# Patient Record
Sex: FEMALE | Race: WHITE | NOT HISPANIC OR LATINO | Employment: OTHER | URBAN - METROPOLITAN AREA
[De-identification: names, ages, dates, MRNs, and addresses within clinical notes are randomized per-mention and may not be internally consistent; named-entity substitution may affect disease eponyms.]

---

## 2017-03-14 DIAGNOSIS — M79.671 PAIN IN BOTH FEET: ICD-10-CM

## 2017-03-14 DIAGNOSIS — G62.9 PERIPHERAL POLYNEUROPATHY: ICD-10-CM

## 2017-03-14 DIAGNOSIS — M79.672 PAIN IN BOTH FEET: ICD-10-CM

## 2017-03-14 RX ORDER — GABAPENTIN 600 MG/1
600 TABLET ORAL 3 TIMES DAILY
Qty: 90 TABLET | Refills: 0 | Status: SHIPPED | OUTPATIENT
Start: 2017-03-14 | End: 2017-04-09 | Stop reason: SDUPTHER

## 2017-03-14 NOTE — TELEPHONE ENCOUNTER
----- Message from Kimber Johns sent at 3/14/2017  8:16 AM CDT -----  Contact: [facility]  Brittany Park  MRN: [id number]  : 1960  PCP: Clyde Almeida  Home Phone      [phone]  Work Phone      Not on file.  Mobile          [phone]      MESSAGE: The pharmacy is requesting a refill for the patient for gabapentin (NEURONTIN) 600 MG tablet. Take one tablet by mouth three times daily. Qty. 90.  Phone:[phone]  Fas:[phone]

## 2017-03-22 ENCOUNTER — OFFICE VISIT (OUTPATIENT)
Dept: NEUROLOGY | Facility: CLINIC | Age: 57
End: 2017-03-22
Payer: COMMERCIAL

## 2017-03-22 VITALS
HEIGHT: 62 IN | HEART RATE: 68 BPM | RESPIRATION RATE: 16 BRPM | SYSTOLIC BLOOD PRESSURE: 128 MMHG | BODY MASS INDEX: 48.11 KG/M2 | DIASTOLIC BLOOD PRESSURE: 82 MMHG | WEIGHT: 261.44 LBS

## 2017-03-22 DIAGNOSIS — R20.2 ARM PARESTHESIA, LEFT: ICD-10-CM

## 2017-03-22 DIAGNOSIS — M79.602 LEFT ARM PAIN: ICD-10-CM

## 2017-03-22 DIAGNOSIS — G62.9 PERIPHERAL POLYNEUROPATHY: Primary | ICD-10-CM

## 2017-03-22 DIAGNOSIS — F32.A DEPRESSION, UNSPECIFIED DEPRESSION TYPE: ICD-10-CM

## 2017-03-22 PROCEDURE — 99999 PR PBB SHADOW E&M-EST. PATIENT-LVL IV: CPT | Mod: PBBFAC,,, | Performed by: NURSE PRACTITIONER

## 2017-03-22 PROCEDURE — 99214 OFFICE O/P EST MOD 30 MIN: CPT | Mod: S$GLB,,, | Performed by: NURSE PRACTITIONER

## 2017-03-22 PROCEDURE — 3074F SYST BP LT 130 MM HG: CPT | Mod: S$GLB,,, | Performed by: NURSE PRACTITIONER

## 2017-03-22 PROCEDURE — 3079F DIAST BP 80-89 MM HG: CPT | Mod: S$GLB,,, | Performed by: NURSE PRACTITIONER

## 2017-03-22 PROCEDURE — 1160F RVW MEDS BY RX/DR IN RCRD: CPT | Mod: S$GLB,,, | Performed by: NURSE PRACTITIONER

## 2017-03-22 RX ORDER — PANTOPRAZOLE SODIUM 40 MG/1
40 TABLET, DELAYED RELEASE ORAL EVERY MORNING
COMMUNITY
Start: 2017-03-01

## 2017-03-22 RX ORDER — DULOXETIN HYDROCHLORIDE 30 MG/1
60 CAPSULE, DELAYED RELEASE ORAL DAILY
Qty: 60 CAPSULE | Refills: 5 | Status: SHIPPED | OUTPATIENT
Start: 2017-03-22 | End: 2017-08-01 | Stop reason: SDUPTHER

## 2017-03-22 NOTE — MR AVS SNAPSHOT
[facility]  [address] [facility] [address]  Phone: [phone]  Fax: [phone]                  Brittany Park   3/22/2017 1:20 PM   Office Visit    Description:  Female : 1960   Provider:  Tiera Johns NP   Department:  [facility]           Reason for Visit     Neurologic Problem           Diagnoses this Visit        Comments    Peripheral polyneuropathy         Depression, unspecified depression type         Left arm pain         Arm paresthesia, left                To Do List           Goals (5 Years of Data)     None      Follow-Up and Disposition     Return in about 4 months (around 2017).       These Medications        Disp Refills Start End    duloxetine (CYMBALTA) 30 MG capsule 60 capsule 5 3/22/2017 3/22/2018    Take 2 capsules (60 mg total) by mouth once daily. - Oral    Pharmacy: [facility] [address] [place] [id number] [facility] [address] Ph #: [phone]         [facility] On Call     [facility] On Call Nurse Care Line -  Assistance  Registered nurses in the [facility] On Call Center provide clinical advisement, health education, appointment booking, and other advisory services.  Call for this free service at [phone].             Medications           Message regarding Medications     Verify the changes and/or additions to your medication regime listed below are the same as discussed with your clinician today.  If any of these changes or additions are incorrect, please notify your healthcare provider.        CHANGE how you are taking these medications     Start Taking Instead of    duloxetine (CYMBALTA) 30 MG capsule duloxetine (CYMBALTA) 30 MG capsule    Dosage:  Take 2 capsules (60 mg total) by mouth once daily. Dosage:  Take 1 capsule (30 mg total) by mouth once daily.    Reason for Change:  Reorder       STOP taking these medications     hydrocodone-acetaminophen 7.5-325mg (NORCO) 7.5-325 mg per tablet Take 1 tablet by  "mouth every 6 (six) hours as needed for Pain.           Verify that the below list of medications is an accurate representation of the medications you are currently taking.  If none reported, the list may be blank. If incorrect, please contact your healthcare provider. Carry this list with you in case of emergency.           Current Medications     alprazolam (XANAX) 0.25 MG tablet Take 0.25 mg by mouth 2 (two) times daily as needed for Anxiety.    aspirin (ECOTRIN) 81 MG EC tablet Take 81 mg by mouth once daily.    atorvastatin (LIPITOR) 40 MG tablet Take 40 mg by mouth once daily.    CALCIUM CARBONATE/VITAMIN D3 (CALCIUM WITH VITAMIN D ORAL) Take 1 tablet by mouth 2 (two) times daily.    duloxetine (CYMBALTA) 30 MG capsule Take 2 capsules (60 mg total) by mouth once daily.    fesoterodine (TOVIAZ) 4 mg Tb24 Take 1 tablet (4 mg total) by mouth once daily.    furosemide (LASIX) 20 MG tablet Take 20 mg by mouth 2 (two) times daily.    gabapentin (NEURONTIN) 600 MG tablet Take 1 tablet (600 mg total) by mouth 3 (three) times daily.    meloxicam (MOBIC) 7.5 MG tablet Take 7.5 mg by mouth once daily.     metformin (GLUCOPHAGE) 500 MG tablet Take 250 mg by mouth daily with breakfast.     metoprolol tartrate (LOPRESSOR) 50 MG tablet Take 50 mg by mouth 2 (two) times daily.     multivitamin (ONE DAILY MULTIVITAMIN) per tablet Take 1 tablet by mouth once daily.    pantoprazole (PROTONIX) 40 MG tablet Take 40 mg by mouth once daily.     potassium chloride 10% (KAYCIEL) 20 mEq/15 mL solution TAKE 15 ML'S BY MOUTH DAILY    spironolactone (ALDACTONE) 25 MG tablet Take 25 mg by mouth once daily.    UNKNOWN TO PATIENT Apply 1 application topically once daily.           Clinical Reference Information           Your Vitals Were     BP Pulse Resp Height Weight Last Period    128/82 (BP Location: Right arm, Patient Position: Sitting, BP Method: Manual) 68 16 5' 2" (1.575 m) 118.6 kg (261 lb 7.5 oz) 11/20/1998    BMI                " 47.82 kg/m2          Blood Pressure          Most Recent Value    BP  128/82      Allergies as of 3/22/2017     Morphine    Latex, Natural Rubber      Immunizations Administered on Date of Encounter - 3/22/2017     None      Orders Placed During Today's Visit      Normal Orders This Visit    Ambulatory consult to Orthopedics       Language Assistance Services     ATTENTION: Language assistance services are available, free of charge. Please call [phone].      ATENCIÓN: Si habla español, tiene a penny disposición servicios gratuitos de asistencia lingüística. Llame al [phone].     CHÚ Ý: N?u b?n nói Ti?ng Vi?t, có các d?ch v? h? tr? ngôn ng? mi?n phí dành cho b?n. G?i s? [phone].         [facility] complies with applicable Federal civil rights laws and does not discriminate on the basis of race, color, national origin, age, disability, or sex.

## 2017-03-22 NOTE — PROGRESS NOTES
HPI:  Brittany Park is a 56 y.o. female with polyneuropathy affecting the hands and feet, as well as mild right CTS. She is S/P left CTR.     Her Prozac was changed to Cymbalta at her last visit, in an attempt to more optimally control her neuropathic complaints to her feet, in addition to Gabapentin and compound cream. Her foot pain has improved by 50%, but she continues with some burning pain to her toes at night.     She finds that she has more energy with the Cymbalta, and her depression is better controlled with this than with Prozac.     Her LUE paresthesias continue in the ulnar distribution; however, the paresthesias extend above her elbow, nearly to her shoulder. She has still not yet seen Dr. Mitchell for this, as she never received a call to schedule.     Review of Systems   Constitutional: Negative for fever.   Eyes: Negative for blurred vision and double vision.   Respiratory: Negative for hemoptysis.    Cardiovascular: Negative for leg swelling.   Gastrointestinal: Negative for blood in stool.   Genitourinary: Negative for hematuria.   Musculoskeletal: Negative for back pain and falls.   Skin: Negative for rash.   Neurological: Positive for tingling and sensory change. Negative for dizziness, weakness and headaches.   Psychiatric/Behavioral: Negative for depression. The patient does not have insomnia.    Some left knee pain for years. She can talk to ortho about this as discussed    Exam:  Gen Appearance, well developed/nourished in no apparent distress  CV: 2+ distal pulses with no edema or swelling  Neuro:  MS: Awake, alert,Sustains attention. Recent/remote memory intact, Language is full to spontaneous speech/comprehension. Fund of Knowledge is full  CN: Optic discs are flat with normal vasculature, PERRL, Extraoccular movements and visual fields are full. Normal facial sensation and strength,  Tongue and Palate are midline and strong. Shoulder Shrug symmetric and strong.  Motor: Normal bulk,  tone, no abnormal movements. 5/5 strength bilateral upper/lower extremities with 2+ reflexes in the arms and 1+ at the knees and none at the knees   Sensory: symmetric to temp, pin and vibration.  Romberg negative  Cerebellar: Finger-nose,Heal-shin, Rapid alternating movements intact  Gait: Normal stance, no ataxia    EMG 7/2016:     1. Mild Right Carpal Tunnel Syndrome. (Resolution of Left CTS since 7/2015 EMG/NCS is noted)  2. 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 This UE findings is new since 7/2015 EMG/NCS  3. No evidence of ulnar neuropathy at the elbow.     Assessment/Plan: Brittany Park is a 56 y.o. female with polyneuropathy, as well as bilateral CTS. She is S/P left CTR. Her neuropathic complaints are improved since increasing Gabapentin to tid dosing.     I recommend:     1. Increase Cymbalta to 60 mg qd for greater control of her neuropathy.   2. Continue Gabapentin as well for neuropathy.   3. Refill neuropathic cream.   4. Refer to Dr. Broussard for evaluation of ongoing LUE pain and paresthesias, as EMG was unrevealing for ulnar neuropathy. Her paresthesias affecting the left ulnar distribution are unchanged; it is important to note that her complaints extend 5-6 inches above her elbow. While the lack of ulnar neuropathy on her EMG does not exclude ulnar neuropathy, her complaints could be more ortho related.     Follow up in 4 months.

## 2017-04-09 DIAGNOSIS — G62.9 PERIPHERAL POLYNEUROPATHY: ICD-10-CM

## 2017-04-09 DIAGNOSIS — M79.671 PAIN IN BOTH FEET: ICD-10-CM

## 2017-04-09 DIAGNOSIS — M79.672 PAIN IN BOTH FEET: ICD-10-CM

## 2017-04-10 ENCOUNTER — TELEPHONE (OUTPATIENT)
Dept: ORTHOPEDICS | Facility: CLINIC | Age: 57
End: 2017-04-10

## 2017-04-10 DIAGNOSIS — R52 PAIN: Primary | ICD-10-CM

## 2017-04-10 NOTE — TELEPHONE ENCOUNTER
Brittany Park reminded of appointment on 4/11/17 with Dr. VALENTINA Broussard w/time and location. Notified of need for xray before OV w/date, time, and location of appts. Pt verbalized understanding.    Per patient LEFT hand and elbow.

## 2017-04-11 ENCOUNTER — HOSPITAL ENCOUNTER (OUTPATIENT)
Dept: RADIOLOGY | Facility: OTHER | Age: 57
Discharge: HOME | End: 2017-04-11
Attending: ORTHOPAEDIC SURGERY
Payer: COMMERCIAL

## 2017-04-11 ENCOUNTER — OFFICE VISIT (OUTPATIENT)
Dept: ORTHOPEDICS | Facility: CLINIC | Age: 57
End: 2017-04-11
Payer: COMMERCIAL

## 2017-04-11 VITALS
WEIGHT: 261.44 LBS | SYSTOLIC BLOOD PRESSURE: 99 MMHG | BODY MASS INDEX: 48.11 KG/M2 | HEIGHT: 62 IN | HEART RATE: 66 BPM | DIASTOLIC BLOOD PRESSURE: 65 MMHG

## 2017-04-11 DIAGNOSIS — G56.20 LESION OF ULNAR NERVE, UNSPECIFIED LATERALITY: Primary | ICD-10-CM

## 2017-04-11 DIAGNOSIS — R52 PAIN: ICD-10-CM

## 2017-04-11 DIAGNOSIS — G56.22 ULNAR NEUROPATHY OF LEFT UPPER EXTREMITY: Primary | ICD-10-CM

## 2017-04-11 PROCEDURE — 73080 X-RAY EXAM OF ELBOW: CPT | Mod: 26,LT,, | Performed by: RADIOLOGY

## 2017-04-11 PROCEDURE — 73080 X-RAY EXAM OF ELBOW: CPT | Mod: TC,LT

## 2017-04-11 PROCEDURE — 1160F RVW MEDS BY RX/DR IN RCRD: CPT | Mod: S$GLB,,, | Performed by: ORTHOPAEDIC SURGERY

## 2017-04-11 PROCEDURE — 99999 PR PBB SHADOW E&M-EST. PATIENT-LVL III: CPT | Mod: PBBFAC,,, | Performed by: ORTHOPAEDIC SURGERY

## 2017-04-11 PROCEDURE — 73130 X-RAY EXAM OF HAND: CPT | Mod: TC,LT

## 2017-04-11 PROCEDURE — 3074F SYST BP LT 130 MM HG: CPT | Mod: S$GLB,,, | Performed by: ORTHOPAEDIC SURGERY

## 2017-04-11 PROCEDURE — 99204 OFFICE O/P NEW MOD 45 MIN: CPT | Mod: S$GLB,,, | Performed by: ORTHOPAEDIC SURGERY

## 2017-04-11 PROCEDURE — 73130 X-RAY EXAM OF HAND: CPT | Mod: 26,LT,, | Performed by: RADIOLOGY

## 2017-04-11 PROCEDURE — 3078F DIAST BP <80 MM HG: CPT | Mod: S$GLB,,, | Performed by: ORTHOPAEDIC SURGERY

## 2017-04-11 RX ORDER — CETIRIZINE HYDROCHLORIDE 10 MG/1
10 TABLET ORAL DAILY
COMMUNITY
End: 2020-02-10

## 2017-04-11 RX ORDER — GABAPENTIN 600 MG/1
TABLET ORAL
Qty: 90 TABLET | Refills: 5 | Status: SHIPPED | OUTPATIENT
Start: 2017-04-11 | End: 2017-08-01 | Stop reason: SDUPTHER

## 2017-04-11 NOTE — LETTER
April 24, 2017      Tiera Johns, NP  [address] [facility] [address]           [facility]  [address], [address]  [facility] [address]  Phone: [phone]          Patient: Brittany Park   MR Number: [id number]   YOB: 1960   Date of Visit: 4/11/2017       Dear Tiera Johns:    Thank you for referring Brittany Park to me for evaluation. Attached you will find relevant portions of my assessment and plan of care.    If you have questions, please do not hesitate to call me. I look forward to following Brittany Park along with you.    Sincerely,    Violette Broussard MD    Enclosure  CC:  No Recipients    If you would like to receive this communication electronically, please contact [email or web address] or [phone] to request more information on [employer] Link access.    For providers and/or their staff who would like to refer a patient to [facility], please contact us through our one-stop-shop provider referral line, [facility], at [phone].    If you feel you have received this communication in error or would no longer like to receive these types of communications, please e-mail [email or web address]

## 2017-04-11 NOTE — PROGRESS NOTES
CC:  Left Cubital Tunnel Syndrome    HPI:  Brittany Park is a  57 y.o.  Female (RHD) with left hand pain and numbness.  Symptoms have been present for some time.  She is s/p CTR by outside surgeon with good relief. Unfortunately her ulnar nerve symptoms were unmasked following resolution of her CTS. Patient is s/p EMG with equivocal results indicating possible Ulnar nerve compression. She has attempted bracing with minimal relief. Symptoms present at all times. Reports significant decrease in  strength.    PAST MEDICAL HISTORY:   Past Medical History:   Diagnosis Date    Acid reflux     Anxiety     Edema     Hypertension     FER on CPAP     Other and unspecified hyperlipidemia     Urinary frequency     Urinary incontinence      PAST SURGICAL HISTORY:   Past Surgical History:   Procedure Laterality Date    APPENDECTOMY      CARPAL TUNNEL RELEASE  10/2015    left hand    CHOLECYSTECTOMY      HYSTERECTOMY      BRETT/BSO    UPPER GASTROINTESTINAL ENDOSCOPY       FAMILY HISTORY:   Family History   Problem Relation Age of Onset    Stroke Father     Diabetes Mother     Stroke Mother     Diabetes Brother     Breast cancer Neg Hx     Colon cancer Neg Hx     Ovarian cancer Neg Hx      SOCIAL HISTORY:   Social History     Social History    Marital status:      Spouse name: N/A    Number of children: N/A    Years of education: N/A     Occupational History    Not on file.     Social History Main Topics    Smoking status: Never Smoker    Smokeless tobacco: Never Used    Alcohol use No    Drug use: No    Sexual activity: Not Currently     Birth control/ protection: Surgical      Comment:      Other Topics Concern    Not on file     Social History Narrative       MEDICATIONS:   Current Outpatient Prescriptions:     alprazolam (XANAX) 0.25 MG tablet, Take 0.25 mg by mouth 2 (two) times daily as needed for Anxiety., Disp: , Rfl:     aspirin (ECOTRIN) 81 MG EC tablet, Take 81 mg by  "mouth once daily., Disp: , Rfl:     atorvastatin (LIPITOR) 40 MG tablet, Take 40 mg by mouth once daily., Disp: , Rfl:     CALCIUM CARBONATE/VITAMIN D3 (CALCIUM WITH VITAMIN D ORAL), Take 1 tablet by mouth 2 (two) times daily., Disp: , Rfl:     cetirizine (ZYRTEC) 10 MG tablet, Take 10 mg by mouth once daily., Disp: , Rfl:     duloxetine (CYMBALTA) 30 MG capsule, Take 2 capsules (60 mg total) by mouth once daily., Disp: 60 capsule, Rfl: 5    fesoterodine (TOVIAZ) 4 mg Tb24, Take 1 tablet (4 mg total) by mouth once daily., Disp: 30 tablet, Rfl: 11    furosemide (LASIX) 20 MG tablet, Take 20 mg by mouth 2 (two) times daily., Disp: , Rfl:     gabapentin (NEURONTIN) 600 MG tablet, Take 1 tablet (600 mg total) by mouth 3 (three) times daily., Disp: 90 tablet, Rfl: 0    meloxicam (MOBIC) 7.5 MG tablet, Take 7.5 mg by mouth once daily. , Disp: , Rfl:     metformin (GLUCOPHAGE) 500 MG tablet, Take 250 mg by mouth daily with breakfast. , Disp: , Rfl:     metoprolol tartrate (LOPRESSOR) 50 MG tablet, Take 50 mg by mouth 2 (two) times daily. , Disp: , Rfl:     multivitamin (ONE DAILY MULTIVITAMIN) per tablet, Take 1 tablet by mouth once daily., Disp: , Rfl:     pantoprazole (PROTONIX) 40 MG tablet, Take 40 mg by mouth once daily. , Disp: , Rfl:     potassium chloride 10% (KAYCIEL) 20 mEq/15 mL solution, TAKE 15 ML'S BY MOUTH DAILY, Disp: 473 mL, Rfl: 0    spironolactone (ALDACTONE) 25 MG tablet, Take 25 mg by mouth once daily., Disp: , Rfl:     UNKNOWN TO PATIENT, Apply 1 application topically once daily., Disp: , Rfl:   ALLERGIES:   Review of patient's allergies indicates:   Allergen Reactions    Morphine Itching    Latex, natural rubber Rash       VITAL SIGNS: BP 99/65 (BP Location: Right arm)  Pulse 66  Ht 5' 2" (1.575 m)  Wt 118.6 kg (261 lb 7.5 oz)  LMP 11/20/1998  BMI 47.82 kg/m2     Review of Systems   Constitution: Negative. Negative for chills, fever and night sweats.   HENT: Negative for " congestion and headaches.    Eyes: Negative for blurred vision, left vision loss and right vision loss.   Cardiovascular: Negative for chest pain and syncope.   Respiratory: Negative for cough and shortness of breath.    Endocrine: Negative for polydipsia, polyphagia and polyuria.   Hematologic/Lymphatic: Negative for bleeding problem. Does not bruise/bleed easily.   Skin: Negative for dry skin, itching and rash.   Musculoskeletal: Negative for falls and muscle weakness.   Gastrointestinal: Negative for abdominal pain and bowel incontinence.   Genitourinary: Negative for bladder incontinence and nocturia.   Neurological: Negative for disturbances in coordination, loss of balance and seizures.   Psychiatric/Behavioral: Negative for depression. The patient does not have insomnia.    Allergic/Immunologic: Negative for hives and persistent infections.       Physical Exam   Constitutional: Oriented to person, place, and time. Appears well-developed and well-nourished.   HENT:   Head: Normocephalic and atraumatic.   Nose: Nose normal.   Eyes: No scleral icterus.   Neck: Normal range of motion. Neck supple.   Cardiovascular: Normal rate and regular rhythm.    Pulses:       Radial pulses are 2+ on the right side, and 2+ on the left side.   Pulmonary/Chest: Effort normal and breath sounds normal.   Abdominal: Soft.   Neurological: Alert and oriented to person, place, and time.   Skin: Skin is warm.   Psychiatric: Normal mood and affect.     +tinels at left elbow. No sara subluxation of ulnar nerve. +ve paraesthesias with elbow flexion FROM wrist. 2+ pulses. SILT. Neg fromment's sign      Assessment:   Left ulnar nerve compression    Plan:    Will schedule for ulnar nerve decompression. All questions answered in detail. Consents signed

## 2017-04-24 NOTE — PROGRESS NOTES
I have personally taken the history and examined this patient. I agree with the resident's note as stated above. Plan for UND.  I have explained the risks, benefits, and alternatives of the procedure to the patient in great detail. The patient voices understanding and all questions have been answered. The patient agrees with to proceed as planned. Consents were performed in clinic.

## 2017-05-01 ENCOUNTER — TELEPHONE (OUTPATIENT)
Dept: ORTHOPEDICS | Facility: CLINIC | Age: 57
End: 2017-05-01

## 2017-05-01 NOTE — TELEPHONE ENCOUNTER
----- Message from Carola Hermanman sent at 5/1/2017 12:30 PM CDT -----  Contact: pt  x_  1st Request  _  2nd Request  _  3rd Request      Who:pt     Why: pt calling in regards to details of her surgery     What Number to Call Back: [phone]    When to Expect a call back: (Before the end of the day)   -- if call after 3:00 call back will be tomorrow.

## 2017-05-01 NOTE — TELEPHONE ENCOUNTER
Mrs. Park wanted to know what time her surgery is on 05/03/17. I informed her that we will call her tomorrow with that info as we dont know the exact times yet. Pt understands.

## 2017-05-02 ENCOUNTER — TELEPHONE (OUTPATIENT)
Dept: ORTHOPEDICS | Facility: CLINIC | Age: 57
End: 2017-05-02

## 2017-05-02 ENCOUNTER — ANESTHESIA EVENT (OUTPATIENT)
Dept: SURGERY | Facility: HOSPITAL | Age: 57
End: 2017-05-02
Payer: COMMERCIAL

## 2017-05-02 NOTE — TELEPHONE ENCOUNTER
Brittany Park notified of arrival time 0745 for surgery on 5/3/17 with Dr. VALENTINA Broussard. At [facility] surgery center. Post Op appointment made, slip in mail.

## 2017-05-02 NOTE — TELEPHONE ENCOUNTER
----- Message from Fadumo Beltran sent at 5/2/2017  3:17 PM CDT -----  Contact: self  [phone]  Patient is calling for time and instruction for procedure for tomorrow. Thank You

## 2017-05-03 ENCOUNTER — HOSPITAL ENCOUNTER (OUTPATIENT)
Facility: HOSPITAL | Age: 57
Discharge: HOME | End: 2017-05-03
Attending: ORTHOPAEDIC SURGERY | Admitting: ORTHOPAEDIC SURGERY
Payer: COMMERCIAL

## 2017-05-03 ENCOUNTER — ANESTHESIA (OUTPATIENT)
Dept: SURGERY | Facility: HOSPITAL | Age: 57
End: 2017-05-03
Payer: COMMERCIAL

## 2017-05-03 VITALS
BODY MASS INDEX: 45.82 KG/M2 | OXYGEN SATURATION: 95 % | WEIGHT: 249 LBS | SYSTOLIC BLOOD PRESSURE: 112 MMHG | RESPIRATION RATE: 20 BRPM | HEART RATE: 65 BPM | TEMPERATURE: 98 F | HEIGHT: 62 IN | DIASTOLIC BLOOD PRESSURE: 68 MMHG

## 2017-05-03 DIAGNOSIS — R20.2 ARM PARESTHESIA, LEFT: ICD-10-CM

## 2017-05-03 DIAGNOSIS — M79.602 LEFT ARM PAIN: Primary | ICD-10-CM

## 2017-05-03 LAB — POCT GLUCOSE: 111 MG/DL (ref 70–110)

## 2017-05-03 PROCEDURE — 64718 REVISE ULNAR NERVE AT ELBOW: CPT | Mod: LT,,, | Performed by: ORTHOPAEDIC SURGERY

## 2017-05-03 PROCEDURE — 63600175 PHARM REV CODE 636 W HCPCS: Performed by: ORTHOPAEDIC SURGERY

## 2017-05-03 PROCEDURE — 76942 ECHO GUIDE FOR BIOPSY: CPT | Performed by: ANESTHESIOLOGY

## 2017-05-03 PROCEDURE — 36000707: Performed by: ORTHOPAEDIC SURGERY

## 2017-05-03 PROCEDURE — 71000045 HC DOSC ROUTINE RECOVERY EA ADD'L HR: Performed by: ORTHOPAEDIC SURGERY

## 2017-05-03 PROCEDURE — 27200750 HC INSULATED NEEDLE/ STIMUPLEX: Performed by: ANESTHESIOLOGY

## 2017-05-03 PROCEDURE — 25000003 PHARM REV CODE 250: Performed by: NURSE ANESTHETIST, CERTIFIED REGISTERED

## 2017-05-03 PROCEDURE — 25000003 PHARM REV CODE 250: Performed by: ORTHOPAEDIC SURGERY

## 2017-05-03 PROCEDURE — D9220A PRA ANESTHESIA: Mod: CRNA,,, | Performed by: NURSE ANESTHETIST, CERTIFIED REGISTERED

## 2017-05-03 PROCEDURE — 71000015 HC POSTOP RECOV 1ST HR: Performed by: ORTHOPAEDIC SURGERY

## 2017-05-03 PROCEDURE — 94760 N-INVAS EAR/PLS OXIMETRY 1: CPT

## 2017-05-03 PROCEDURE — 71000039 HC RECOVERY, EACH ADD'L HOUR: Performed by: ORTHOPAEDIC SURGERY

## 2017-05-03 PROCEDURE — 71000033 HC RECOVERY, INTIAL HOUR: Performed by: ORTHOPAEDIC SURGERY

## 2017-05-03 PROCEDURE — 25000003 PHARM REV CODE 250

## 2017-05-03 PROCEDURE — D9220A PRA ANESTHESIA: Mod: ANES,,, | Performed by: ANESTHESIOLOGY

## 2017-05-03 PROCEDURE — 63600175 PHARM REV CODE 636 W HCPCS: Performed by: NURSE ANESTHETIST, CERTIFIED REGISTERED

## 2017-05-03 PROCEDURE — 37000008 HC ANESTHESIA 1ST 15 MINUTES: Performed by: ORTHOPAEDIC SURGERY

## 2017-05-03 PROCEDURE — 36000706: Performed by: ORTHOPAEDIC SURGERY

## 2017-05-03 PROCEDURE — 71000044 HC DOSC ROUTINE RECOVERY FIRST HOUR: Performed by: ORTHOPAEDIC SURGERY

## 2017-05-03 PROCEDURE — 64415 NJX AA&/STRD BRCH PLXS IMG: CPT | Mod: 59,LT,, | Performed by: ANESTHESIOLOGY

## 2017-05-03 PROCEDURE — 27000221 HC OXYGEN, UP TO 24 HOURS

## 2017-05-03 PROCEDURE — 37000009 HC ANESTHESIA EA ADD 15 MINS: Performed by: ORTHOPAEDIC SURGERY

## 2017-05-03 DEVICE — IMPLANTABLE DEVICE: Type: IMPLANTABLE DEVICE | Site: ELBOW | Status: FUNCTIONAL

## 2017-05-03 RX ORDER — OXYCODONE HYDROCHLORIDE 5 MG/1
15 TABLET ORAL EVERY 4 HOURS PRN
Status: DISCONTINUED | OUTPATIENT
Start: 2017-05-03 | End: 2017-05-03 | Stop reason: HOSPADM

## 2017-05-03 RX ORDER — OXYCODONE AND ACETAMINOPHEN 10; 325 MG/1; MG/1
1 TABLET ORAL EVERY 4 HOURS PRN
Qty: 40 TABLET | Refills: 0 | Status: SHIPPED | OUTPATIENT
Start: 2017-05-03 | End: 2017-08-01

## 2017-05-03 RX ORDER — ACETAMINOPHEN 325 MG/1
650 TABLET ORAL EVERY 4 HOURS PRN
Status: DISCONTINUED | OUTPATIENT
Start: 2017-05-03 | End: 2017-05-03 | Stop reason: HOSPADM

## 2017-05-03 RX ORDER — FENTANYL CITRATE 50 UG/ML
INJECTION, SOLUTION INTRAMUSCULAR; INTRAVENOUS
Status: DISCONTINUED | OUTPATIENT
Start: 2017-05-03 | End: 2017-05-03

## 2017-05-03 RX ORDER — ONDANSETRON 2 MG/ML
INJECTION INTRAMUSCULAR; INTRAVENOUS
Status: DISCONTINUED | OUTPATIENT
Start: 2017-05-03 | End: 2017-05-03

## 2017-05-03 RX ORDER — KETOROLAC TROMETHAMINE 30 MG/ML
15 INJECTION, SOLUTION INTRAMUSCULAR; INTRAVENOUS EVERY 6 HOURS PRN
Status: DISCONTINUED | OUTPATIENT
Start: 2017-05-03 | End: 2017-05-03 | Stop reason: HOSPADM

## 2017-05-03 RX ORDER — LIDOCAINE HCL/PF 100 MG/5ML
SYRINGE (ML) INTRAVENOUS
Status: DISCONTINUED | OUTPATIENT
Start: 2017-05-03 | End: 2017-05-03

## 2017-05-03 RX ORDER — OXYCODONE HYDROCHLORIDE 5 MG/1
TABLET ORAL
Status: COMPLETED
Start: 2017-05-03 | End: 2017-05-03

## 2017-05-03 RX ORDER — MIDAZOLAM HYDROCHLORIDE 1 MG/ML
INJECTION, SOLUTION INTRAMUSCULAR; INTRAVENOUS
Status: DISCONTINUED | OUTPATIENT
Start: 2017-05-03 | End: 2017-05-03

## 2017-05-03 RX ORDER — SODIUM CHLORIDE 9 MG/ML
INJECTION, SOLUTION INTRAVENOUS CONTINUOUS
Status: DISCONTINUED | OUTPATIENT
Start: 2017-05-03 | End: 2017-05-03 | Stop reason: HOSPADM

## 2017-05-03 RX ORDER — KETOROLAC TROMETHAMINE 30 MG/ML
INJECTION, SOLUTION INTRAMUSCULAR; INTRAVENOUS
Status: DISCONTINUED
Start: 2017-05-03 | End: 2017-05-03 | Stop reason: HOSPADM

## 2017-05-03 RX ORDER — KETOCONAZOLE 20 MG/G
CREAM TOPICAL DAILY
COMMUNITY
End: 2018-11-02

## 2017-05-03 RX ORDER — GLYCOPYRROLATE 0.2 MG/ML
INJECTION INTRAMUSCULAR; INTRAVENOUS
Status: DISCONTINUED | OUTPATIENT
Start: 2017-05-03 | End: 2017-05-03

## 2017-05-03 RX ORDER — CEFAZOLIN SODIUM 2 G/50ML
2 SOLUTION INTRAVENOUS ONCE
Status: COMPLETED | OUTPATIENT
Start: 2017-05-03 | End: 2017-05-03

## 2017-05-03 RX ORDER — PROPOFOL 10 MG/ML
VIAL (ML) INTRAVENOUS
Status: DISCONTINUED | OUTPATIENT
Start: 2017-05-03 | End: 2017-05-03

## 2017-05-03 RX ORDER — LIDOCAINE HYDROCHLORIDE 10 MG/ML
1 INJECTION, SOLUTION EPIDURAL; INFILTRATION; INTRACAUDAL; PERINEURAL ONCE
Status: COMPLETED | OUTPATIENT
Start: 2017-05-03 | End: 2017-05-03

## 2017-05-03 RX ORDER — ONDANSETRON 8 MG/1
8 TABLET, ORALLY DISINTEGRATING ORAL EVERY 8 HOURS PRN
Status: DISCONTINUED | OUTPATIENT
Start: 2017-05-03 | End: 2017-05-03 | Stop reason: HOSPADM

## 2017-05-03 RX ADMIN — LIDOCAINE HYDROCHLORIDE 100 MG: 20 INJECTION, SOLUTION INTRAVENOUS at 10:05

## 2017-05-03 RX ADMIN — FENTANYL CITRATE 50 MCG: 50 INJECTION, SOLUTION INTRAMUSCULAR; INTRAVENOUS at 10:05

## 2017-05-03 RX ADMIN — KETOROLAC TROMETHAMINE 15 MG: 30 INJECTION, SOLUTION INTRAMUSCULAR at 12:05

## 2017-05-03 RX ADMIN — ONDANSETRON 8 MG: 8 TABLET, ORALLY DISINTEGRATING ORAL at 01:05

## 2017-05-03 RX ADMIN — CEFAZOLIN SODIUM 2 G: 2 SOLUTION INTRAVENOUS at 10:05

## 2017-05-03 RX ADMIN — OXYCODONE HYDROCHLORIDE 15 MG: 5 TABLET ORAL at 12:05

## 2017-05-03 RX ADMIN — GLYCOPYRROLATE 0.2 MG: 0.2 INJECTION, SOLUTION INTRAMUSCULAR; INTRAVENOUS at 10:05

## 2017-05-03 RX ADMIN — PROPOFOL 160 MG: 10 INJECTION, EMULSION INTRAVENOUS at 10:05

## 2017-05-03 RX ADMIN — ONDANSETRON 4 MG: 2 INJECTION INTRAMUSCULAR; INTRAVENOUS at 11:05

## 2017-05-03 RX ADMIN — FENTANYL CITRATE 25 MCG: 50 INJECTION, SOLUTION INTRAMUSCULAR; INTRAVENOUS at 10:05

## 2017-05-03 RX ADMIN — MIDAZOLAM HYDROCHLORIDE 2 MG: 1 INJECTION, SOLUTION INTRAMUSCULAR; INTRAVENOUS at 10:05

## 2017-05-03 RX ADMIN — PROPOFOL 40 MG: 10 INJECTION, EMULSION INTRAVENOUS at 10:05

## 2017-05-03 RX ADMIN — SODIUM CHLORIDE: 0.9 INJECTION, SOLUTION INTRAVENOUS at 09:05

## 2017-05-03 RX ADMIN — LIDOCAINE HYDROCHLORIDE 10 MG: 10 INJECTION, SOLUTION EPIDURAL; INFILTRATION; INTRACAUDAL; PERINEURAL at 09:05

## 2017-05-03 NOTE — PLAN OF CARE
Dc instructions and prescription given;  Pt verbalizes understanding; VSS; Nad; IV removed tip intact. Tolerating PO fluids.   Partient dc home with daughter

## 2017-05-03 NOTE — ANESTHESIA POSTPROCEDURE EVALUATION
"Anesthesia Post Evaluation    Patient: Brittany Park    Procedure(s) Performed: Procedure(s) (LRB):  RELEASE-NERVE-ULNAR - LEFT ELBOW (Left)    Final Anesthesia Type: general  Patient location during evaluation: PACU  Patient participation: Yes- Able to Participate  Level of consciousness: awake and alert  Post-procedure vital signs: reviewed and stable  Pain management: adequate  Airway patency: patent  PONV status at discharge: No PONV  Anesthetic complications: no      Cardiovascular status: blood pressure returned to baseline  Respiratory status: unassisted  Hydration status: euvolemic  Follow-up not needed.        Visit Vitals    BP (!) 151/71    Pulse 64    Temp 36.4 °C (97.5 °F) (Oral)    Resp 20    Ht 5' 2" (1.575 m)    Wt 112.9 kg (249 lb)    LMP 11/20/1998    SpO2 (!) 94%    Breastfeeding No    BMI 45.54 kg/m2       Pain/Gerardo Score: Pain Assessment Performed: Yes (5/3/2017 12:35 PM)  Presence of Pain: complains of pain/discomfort (5/3/2017 12:35 PM)  Pain Rating Prior to Med Admin: 7 (5/3/2017 12:46 PM)  Gerardo Score: 9 (5/3/2017 12:35 PM)      "

## 2017-05-03 NOTE — IP AVS SNAPSHOT
[facility]  [address]  [facility] [address]  Phone: [phone]           Patient Discharge Instructions   Our goal is to set you up for success. This packet includes information on your condition, medications, and your home care.  It will help you care for yourself to prevent having to return to the hospital.     Please ask your nurse if you have any questions.      There are many details to remember when preparing to leave the hospital. Here is what you will need to do:    1. Take your medicine. If you are prescribed medications, review your Medication List on the following pages. You may have new medications to  at the pharmacy and others that you'll need to stop taking. Review the instructions for how and when to take your medications. Talk with your doctor or nurses if you are unsure of what to do.     2. Go to your follow-up appointments. Specific follow-up information is listed in the following pages. Your may be contacted by a nurse or clinical provider about future appointments. Be sure we have all of the phone numbers to reach you. Please contact your provider's office if you are unable to make an appointment.     3. Watch for warning signs. Your doctor or nurse will give you detailed warning signs to watch for and when to call for assistance. These instructions may also include educational information about your condition. If you experience any of warning signs to your health, call your doctor.           [facility]  Unless otherwise directed by your provider, please   contact [facility], our nurse care line   that is available for 24/7 assistance.     [phone] (toll-free)     Registered nurses in the [facility]   provide: appointment scheduling, clinical advisement, health education, and other advisory services.                  ** Verify the list of medication(s) below is accurate and up to date. Carry this with you in case of  emergency. If your medications have changed, please notify your healthcare provider.             Medication List      START taking these medications        Additional Info                      oxycodone-acetaminophen  mg per tablet   Commonly known as:  PERCOCET   Quantity:  40 tablet   Refills:  0   Dose:  1 tablet    Instructions:  Take 1 tablet by mouth every 4 (four) hours as needed for Pain.     Begin Date    AM    Noon    PM    Bedtime         CONTINUE taking these medications        Additional Info                      alprazolam 0.25 MG tablet   Commonly known as:  XANAX   Refills:  0   Dose:  0.25 mg    Instructions:  Take 0.25 mg by mouth 2 (two) times daily as needed for Anxiety.     Begin Date    AM    Noon    PM    Bedtime       aspirin 81 MG EC tablet   Commonly known as:  ECOTRIN   Refills:  0   Dose:  81 mg    Instructions:  Take 81 mg by mouth once daily.     Begin Date    AM    Noon    PM    Bedtime       atorvastatin 40 MG tablet   Commonly known as:  LIPITOR   Refills:  0   Dose:  40 mg    Instructions:  Take 40 mg by mouth once daily.     Begin Date    AM    Noon    PM    Bedtime       CALCIUM WITH VITAMIN D ORAL   Refills:  0   Dose:  1 tablet    Instructions:  Take 1 tablet by mouth 2 (two) times daily.     Begin Date    AM    Noon    PM    Bedtime       cetirizine 10 MG tablet   Commonly known as:  ZYRTEC   Refills:  0   Dose:  10 mg    Instructions:  Take 10 mg by mouth once daily.     Begin Date    AM    Noon    PM    Bedtime       duloxetine 30 MG capsule   Commonly known as:  CYMBALTA   Quantity:  60 capsule   Refills:  5   Dose:  60 mg    Instructions:  Take 2 capsules (60 mg total) by mouth once daily.     Begin Date    AM    Noon    PM    Bedtime       fesoterodine 4 mg Tb24   Commonly known as:  TOVIAZ   Quantity:  30 tablet   Refills:  11   Dose:  4 mg    Instructions:  Take 1 tablet (4 mg total) by mouth once daily.     Begin Date    AM    Noon    PM    Bedtime        furosemide 20 MG tablet   Commonly known as:  LASIX   Refills:  0   Dose:  20 mg    Instructions:  Take 20 mg by mouth 2 (two) times daily.     Begin Date    AM    Noon    PM    Bedtime       gabapentin 600 MG tablet   Commonly known as:  NEURONTIN   Quantity:  90 tablet   Refills:  5    Instructions:  TAKE ONE TABLET BY MOUTH THREE TIMES DAILY     Begin Date    AM    Noon    PM    Bedtime       IBUPROFEN ORAL   Refills:  0    Instructions:  Take by mouth.     Begin Date    AM    Noon    PM    Bedtime       ketoconazole 2 % cream   Commonly known as:  NIZORAL   Refills:  0    Instructions:  Apply topically once daily.     Begin Date    AM    Noon    PM    Bedtime       meloxicam 7.5 MG tablet   Commonly known as:  MOBIC   Refills:  0   Dose:  7.5 mg    Instructions:  Take 7.5 mg by mouth once daily.     Begin Date    AM    Noon    PM    Bedtime       metformin 500 MG tablet   Commonly known as:  GLUCOPHAGE   Refills:  0   Dose:  250 mg    Instructions:  Take 250 mg by mouth daily with breakfast.     Begin Date    AM    Noon    PM    Bedtime       metoprolol tartrate 50 MG tablet   Commonly known as:  LOPRESSOR   Refills:  0   Dose:  50 mg    Instructions:  Take 50 mg by mouth 2 (two) times daily.     Begin Date    AM    Noon    PM    Bedtime       ONE DAILY MULTIVITAMIN per tablet   Refills:  0   Dose:  1 tablet   Generic drug:  multivitamin    Instructions:  Take 1 tablet by mouth once daily.     Begin Date    AM    Noon    PM    Bedtime       pantoprazole 40 MG tablet   Commonly known as:  PROTONIX   Refills:  0   Dose:  40 mg    Instructions:  Take 40 mg by mouth once daily.     Begin Date    AM    Noon    PM    Bedtime       potassium chloride 10% 20 mEq/15 mL solution   Commonly known as:  KAYCIEL   Quantity:  473 mL   Refills:  0    Instructions:  TAKE 15 ML'S BY MOUTH DAILY     Begin Date    AM    Noon    PM    Bedtime       spironolactone 25 MG tablet   Commonly known as:  ALDACTONE   Refills:  0   Dose:  25  mg    Instructions:  Take 25 mg by mouth once daily.     Begin Date    AM    Noon    PM    Bedtime       UNKNOWN TO PATIENT   Refills:  0   Dose:  1 application   Indications:  Neuropathic Pain    Instructions:  Apply 1 application topically once daily.     Begin Date    AM    Noon    PM    Bedtime            Where to Get Your Medications      You can get these medications from any pharmacy     Bring a paper prescription for each of these medications     oxycodone-acetaminophen  mg per tablet                  Please bring to all follow up appointments:    1. A copy of your discharge instructions.  2. All medicines you are currently taking in their original bottles.  3. Identification and insurance card.    Please arrive 15 minutes ahead of scheduled appointment time.    Please call 24 hours in advance if you must reschedule your appointment and/or time.        Your Scheduled Appointments     May 15, 2017  1:30 PM CDT   Post OP with Viviana Mei PA-C   [facility] ([facility])    [address] [place] [address] [address]  [facility] [address]   [phone]            Aug 01, 2017  1:00 PM CDT   Established Patient Visit with Tiera Johns NP   [facility] ([facility])    [address] [facility] [address] [phone]              Follow-up Information     Follow up with Viviana Mei PA-C In 2 weeks.    Specialties:  Hand Surgery, Orthopedic Surgery    Why:  For suture removal, For wound re-check    Contact information:    [address]  [facility] [address]  [phone]          Discharge Instructions     Future Orders    Call MD for:  difficulty breathing, headache or visual disturbances     Call MD for:  extreme fatigue     Call MD for:  hives     Call MD for:  persistent dizziness or light-headedness     Call MD for:  persistent nausea and vomiting     Call MD for:  redness, tenderness, or signs of infection (pain, swelling,  redness, odor or green/yellow discharge around incision site)     Call MD for:  severe uncontrolled pain     Call MD for:  temperature >100.4     Diet general     Questions:    Total calories:      Fat restriction, if any:      Protein restriction, if any:      Na restriction, if any:      Fluid restriction:      Additional restrictions:      Leave dressing on - Keep it clean, dry, and intact until clinic visit     No driving, operating heavy equipment or signing legal documents while taking pain medication.     [employer] FOR HOME USE         Discharge Instructions       ul  Incision Care  Remember: Follow-up visits allow your doctor to make sure your incision is healing well. Be sure to keep your appointments.   Stitches (sutures), surgical staples, special strips of surgical tape called Steri-Strips, or surgical skin glue may be used to close incisions. They also help stop bleeding and speed healing. To help your incision heal, follow the tips on this handout.  Home care     Steri-Strips   · Always wash your hands before touching your incision.  · Keep your incision clean and dry.  · Avoid doing things that could cause dirt or sweat to get on your incision.  · Dont pick at scabs. They help protect the wound.  · Keep your incision out of water.  · Take a sponge bath to avoid getting your incision wet, unless your healthcare provider tells you otherwise.  · Ask your provider when can you take a shower or bathe.  · Ask your provider about the best way to keep your incision dry when bathing or showering.  · Pat sutures dry if they get wet. Dont rub.  · Leave the bandage (dressing) in place until you are told to remove it or change it. Change it only as directed, using clean hands.  · After the first 12 hours, change your dressing every 24 hours, or as directed by your healthcare provider.  · Change your dressing if it gets wet or soiled.  Care for specific closures  Follow these guidelines unless  your healthcare provider tells you otherwise:  · Sutures or staples. Once you no longer need to keep these dry, clean the wound daily. First remove the bandage using clean hands. Then wash the area gently with soap and warm water. Use a wet cotton swab to loosen and remove any blood or crust that forms. After cleaning, put a thin layer of antibiotic ointment on. Then put on a new bandage.  · Skin glue. Dont put liquid, ointment, or cream on your wound while the glue is in place. Avoid activities that cause heavy sweating. Protect the wound from sunlight. Do not scratch, rub, or pick at the glue. Do not put tape directly over the glue. The glue should peel off within 5 to 10 days.  · Surgical tape. Keep the area dry. If it gets wet, blot the area dry with a clean towel. Surgical tape usually falls off within 7 to 10 days. If it has not fallen off after 10 days, contact your healthcare provider before taking it off yourself. If you are told to remove the tape, put mineral oil or petroleum jelly on a cotton ball. Gently rub the tape until it is removed.  Changing your dressing     Wash your hands before changing a dressing.    Leave the dressing (bandage) in place until you are told to remove it or change it. Follow the instructions below unless told otherwise by your healthcare provider.  · Always wash your hands before changing your dressing.  · After the first 48 hours the incision wound usually will have closed. At this point, leave the incision uncovered and open to the air. If the incision has not closed keep it covered.  · Cover your incision only if your clothing is rubbing it or causing irritation.  · Change your dressing if it gets wet or soiled.  Follow-up care  Follow up with your healthcare provider to ask how long sutures or staples should be left in place. Be sure to return for suture or staple removal as directed. If dissolving stitches were used in your mouth, these will not need to be removed. They  "should fall out or dissolve on their own.  If tape closures were used, remove them yourself when your provider recommends if they have not fallen off on their own. If skin glue was used, the glue will wear off by itself.  When to seek medical care  Call your healthcare provider if you have any of the following:  · More pain, redness, swelling, bleeding, or foul-smelling discharge around the incision area  · Fever of 100.4°F (38ºC) or higher or as directed by your healthcare provider  · Shaking chills  · Vomiting or nausea that doesn't go away  · Numbness, coldness, or tingling around the incision area, or changes in skin color  · Opening of the sutures or wound  · Stitches or staples come apart or fall out or surgical tape falls off before 7 days or as directed by your healthcare provider   Date Last Reviewed: 10/16/2014  © 2714-0011 [employer]. [address] [place] [address]. All rights reserved. This information is not intended as a substitute for professional medical care. Always follow your healthcare professional's instructions.            Primary Diagnosis     Your primary diagnosis was:  Left Upper Limb Pain      Admission Information     Date & Time Provider Department CSN    5/3/2017  8:22 AM Violette Broussard MD [facility] [id number]      Care Providers     Provider Role Specialty Primary office phone    Violette Broussard MD Attending Provider Hand Surgery [phone]    Violette Broussard MD Surgeon  Hand Surgery [phone]      Your Vitals Were     BP Pulse Temp Resp Height Weight    95/75 (BP Location: Right arm, Patient Position: Lying, BP Method: Automatic) 61 97.7 °F (36.5 °C) (Oral) 20 5' 2" (1.575 m) 112.9 kg (249 lb)    Last Period SpO2 BMI          11/20/1998 97% 45.54 kg/m2        Recent Lab Values     No lab values to display.      Allergies as of 5/3/2017        Reactions    Morphine Itching    Latex, Natural Rubber Rash      Advance " Directives     An advance directive is a document which, in the event you are no longer able to make decisions for yourself, tells your healthcare team what kind of treatment you do or do not want to receive, or who you would like to make those decisions for you.  If you do not currently have an advance directive, [facility] encourages you to create one.  For more information call:  [phone] ([phone]),  or log on to [email or web address].        Language Assistance Services     ATTENTION: Language assistance services are available, free of charge. Please call [phone].      ATENCIÓN: Si habla marylin, tiene a penny disposición servicios gratuitos de asistencia lingüística. Llame al [phone].     CHÚ Ý: N?u b?n nói Ti?ng Vi?t, có các d?ch v? h? tr? ngôn ng? mi?n phí dành cho b?n. G?i s? [phone].         [facility] complies with applicable Federal civil rights laws and does not discriminate on the basis of race, color, national origin, age, disability, or sex.

## 2017-05-03 NOTE — PROGRESS NOTES
"Dr. Lee at bedside to discuss anesthesia, consent with pt. Notified pt's SBP 94, pt reported "normal for me". No further instructions/orders given at this time.   "

## 2017-05-03 NOTE — INTERVAL H&P NOTE
The patient has been examined and the H&P has been reviewed:    I concur with the findings and no changes have occurred since H&P was written.    Anesthesia/Surgery risks, benefits and alternative options discussed and understood by patient/family.          Active Hospital Problems    Diagnosis  POA    Left arm pain [M79.602]  Yes      Resolved Hospital Problems    Diagnosis Date Resolved POA   No resolved problems to display.

## 2017-05-03 NOTE — H&P
CC: Left Cubital Tunnel Syndrome     HPI:  Brittany Park is a 57 y.o. Female (RHD) with left hand pain and numbness. Symptoms have been present for some time. She is s/p CTR by outside surgeon with good relief. Unfortunately her ulnar nerve symptoms were unmasked following resolution of her CTS. Patient is s/p EMG with equivocal results indicating possible Ulnar nerve compression. She has attempted bracing with minimal relief. Symptoms present at all times. Reports significant decrease in  strength.     PAST MEDICAL HISTORY:        Past Medical History:   Diagnosis Date    Acid reflux      Anxiety      Edema      Hypertension      FER on CPAP      Other and unspecified hyperlipidemia      Urinary frequency      Urinary incontinence        PAST SURGICAL HISTORY:         Past Surgical History:   Procedure Laterality Date    APPENDECTOMY        CARPAL TUNNEL RELEASE   10/2015     left hand    CHOLECYSTECTOMY        HYSTERECTOMY         BRETT/BSO    UPPER GASTROINTESTINAL ENDOSCOPY          FAMILY HISTORY:         Family History   Problem Relation Age of Onset    Stroke Father      Diabetes Mother      Stroke Mother      Diabetes Brother      Breast cancer Neg Hx      Colon cancer Neg Hx      Ovarian cancer Neg Hx        SOCIAL HISTORY:    Social History    Social History            Social History    Marital status:        Spouse name: N/A    Number of children: N/A    Years of education: N/A          Occupational History    Not on file.             Social History Main Topics    Smoking status: Never Smoker    Smokeless tobacco: Never Used    Alcohol use No    Drug use: No    Sexual activity: Not Currently       Birth control/ protection: Surgical         Comment:            Other Topics Concern    Not on file      Social History Narrative            MEDICATIONS:   Current Outpatient Prescriptions:    alprazolam (XANAX) 0.25 MG tablet, Take 0.25 mg by mouth 2 (two)  "times daily as needed for Anxiety., Disp: , Rfl:    aspirin (ECOTRIN) 81 MG EC tablet, Take 81 mg by mouth once daily., Disp: , Rfl:    atorvastatin (LIPITOR) 40 MG tablet, Take 40 mg by mouth once daily., Disp: , Rfl:    CALCIUM CARBONATE/VITAMIN D3 (CALCIUM WITH VITAMIN D ORAL), Take 1 tablet by mouth 2 (two) times daily., Disp: , Rfl:    cetirizine (ZYRTEC) 10 MG tablet, Take 10 mg by mouth once daily., Disp: , Rfl:    duloxetine (CYMBALTA) 30 MG capsule, Take 2 capsules (60 mg total) by mouth once daily., Disp: 60 capsule, Rfl: 5   fesoterodine (TOVIAZ) 4 mg Tb24, Take 1 tablet (4 mg total) by mouth once daily., Disp: 30 tablet, Rfl: 11   furosemide (LASIX) 20 MG tablet, Take 20 mg by mouth 2 (two) times daily., Disp: , Rfl:    gabapentin (NEURONTIN) 600 MG tablet, Take 1 tablet (600 mg total) by mouth 3 (three) times daily., Disp: 90 tablet, Rfl: 0   meloxicam (MOBIC) 7.5 MG tablet, Take 7.5 mg by mouth once daily. , Disp: , Rfl:    metformin (GLUCOPHAGE) 500 MG tablet, Take 250 mg by mouth daily with breakfast. , Disp: , Rfl:    metoprolol tartrate (LOPRESSOR) 50 MG tablet, Take 50 mg by mouth 2 (two) times daily. , Disp: , Rfl:    multivitamin (ONE DAILY MULTIVITAMIN) per tablet, Take 1 tablet by mouth once daily., Disp: , Rfl:    pantoprazole (PROTONIX) 40 MG tablet, Take 40 mg by mouth once daily. , Disp: , Rfl:    potassium chloride 10% (KAYCIEL) 20 mEq/15 mL solution, TAKE 15 ML'S BY MOUTH DAILY, Disp: 473 mL, Rfl: 0   spironolactone (ALDACTONE) 25 MG tablet, Take 25 mg by mouth once daily., Disp: , Rfl:    UNKNOWN TO PATIENT, Apply 1 application topically once daily., Disp: , Rfl:   ALLERGIES:   Review of patient's allergies indicates:   Allergen Reactions    Morphine Itching    Latex, natural rubber Rash         VITAL SIGNS: BP 99/65 (BP Location: Right arm)  Pulse 66  Ht 5' 2" (1.575 m)  Wt 118.6 kg (261 lb 7.5 oz)  LMP 11/20/1998  BMI 47.82 kg/m2      Review of Systems "   Constitution: Negative. Negative for chills, fever and night sweats.   HENT: Negative for congestion and headaches.   Eyes: Negative for blurred vision, left vision loss and right vision loss.   Cardiovascular: Negative for chest pain and syncope.   Respiratory: Negative for cough and shortness of breath.   Endocrine: Negative for polydipsia, polyphagia and polyuria.   Hematologic/Lymphatic: Negative for bleeding problem. Does not bruise/bleed easily.   Skin: Negative for dry skin, itching and rash.   Musculoskeletal: Negative for falls and muscle weakness.   Gastrointestinal: Negative for abdominal pain and bowel incontinence.   Genitourinary: Negative for bladder incontinence and nocturia.   Neurological: Negative for disturbances in coordination, loss of balance and seizures.   Psychiatric/Behavioral: Negative for depression. The patient does not have insomnia.   Allergic/Immunologic: Negative for hives and persistent infections.         Physical Exam   Constitutional: Oriented to person, place, and time. Appears well-developed and well-nourished.   HENT:   Head: Normocephalic and atraumatic.   Nose: Nose normal.   Eyes: No scleral icterus.   Neck: Normal range of motion. Neck supple.   Cardiovascular: Normal rate and regular rhythm.   Pulses:  Radial pulses are 2+ on the right side, and 2+ on the left side.   Pulmonary/Chest: Effort normal and breath sounds normal.   Abdominal: Soft.   Neurological: Alert and oriented to person, place, and time.   Skin: Skin is warm.   Psychiatric: Normal mood and affect.      +tinels at left elbow. No sara subluxation of ulnar nerve. +ve paraesthesias with elbow flexion FROM wrist. 2+ pulses. SILT. Neg fromment's sign        Assessment:   Left ulnar nerve compression     Plan:     To OR  for ulnar nerve decompression. All questions answered in detail. Consents signed

## 2017-05-03 NOTE — ANESTHESIA RELEASE NOTE
"Anesthesia Release from PACU Note    Patient: Brittany Park    Procedure(s) Performed: Procedure(s) (LRB):  RELEASE-NERVE-ULNAR - LEFT ELBOW (Left)    Anesthesia type: general    Post pain: Adequate analgesia    Post assessment: no apparent anesthetic complications    Last Vitals:   Visit Vitals    BP (!) 151/71    Pulse 64    Temp 36.4 °C (97.5 °F) (Oral)    Resp 20    Ht 5' 2" (1.575 m)    Wt 112.9 kg (249 lb)    LMP 11/20/1998    SpO2 (!) 94%    Breastfeeding No    BMI 45.54 kg/m2       Post vital signs: stable    Level of consciousness: awake    Nausea/Vomiting: no nausea/no vomiting    Complications: none    Airway Patency: patent    Respiratory: unassisted    Cardiovascular: stable and blood pressure at baseline    Hydration: euvolemic  "

## 2017-05-03 NOTE — DISCHARGE INSTRUCTIONS
ul  Incision Care  Remember: Follow-up visits allow your doctor to make sure your incision is healing well. Be sure to keep your appointments.   Stitches (sutures), surgical staples, special strips of surgical tape called Steri-Strips, or surgical skin glue may be used to close incisions. They also help stop bleeding and speed healing. To help your incision heal, follow the tips on this handout.  Home care     Steri-Strips   · Always wash your hands before touching your incision.  · Keep your incision clean and dry.  · Avoid doing things that could cause dirt or sweat to get on your incision.  · Dont pick at scabs. They help protect the wound.  · Keep your incision out of water.  · Take a sponge bath to avoid getting your incision wet, unless your healthcare provider tells you otherwise.  · Ask your provider when can you take a shower or bathe.  · Ask your provider about the best way to keep your incision dry when bathing or showering.  · Pat sutures dry if they get wet. Dont rub.  · Leave the bandage (dressing) in place until you are told to remove it or change it. Change it only as directed, using clean hands.  · After the first 12 hours, change your dressing every 24 hours, or as directed by your healthcare provider.  · Change your dressing if it gets wet or soiled.  Care for specific closures  Follow these guidelines unless your healthcare provider tells you otherwise:  · Sutures or staples. Once you no longer need to keep these dry, clean the wound daily. First remove the bandage using clean hands. Then wash the area gently with soap and warm water. Use a wet cotton swab to loosen and remove any blood or crust that forms. After cleaning, put a thin layer of antibiotic ointment on. Then put on a new bandage.  · Skin glue. Dont put liquid, ointment, or cream on your wound while the glue is in place. Avoid activities that cause heavy sweating. Protect the wound from sunlight. Do not scratch, rub, or pick at the  glue. Do not put tape directly over the glue. The glue should peel off within 5 to 10 days.  · Surgical tape. Keep the area dry. If it gets wet, blot the area dry with a clean towel. Surgical tape usually falls off within 7 to 10 days. If it has not fallen off after 10 days, contact your healthcare provider before taking it off yourself. If you are told to remove the tape, put mineral oil or petroleum jelly on a cotton ball. Gently rub the tape until it is removed.  Changing your dressing     Wash your hands before changing a dressing.    Leave the dressing (bandage) in place until you are told to remove it or change it. Follow the instructions below unless told otherwise by your healthcare provider.  · Always wash your hands before changing your dressing.  · After the first 48 hours the incision wound usually will have closed. At this point, leave the incision uncovered and open to the air. If the incision has not closed keep it covered.  · Cover your incision only if your clothing is rubbing it or causing irritation.  · Change your dressing if it gets wet or soiled.  Follow-up care  Follow up with your healthcare provider to ask how long sutures or staples should be left in place. Be sure to return for suture or staple removal as directed. If dissolving stitches were used in your mouth, these will not need to be removed. They should fall out or dissolve on their own.  If tape closures were used, remove them yourself when your provider recommends if they have not fallen off on their own. If skin glue was used, the glue will wear off by itself.  When to seek medical care  Call your healthcare provider if you have any of the following:  · More pain, redness, swelling, bleeding, or foul-smelling discharge around the incision area  · Fever of 100.4°F (38ºC) or higher or as directed by your healthcare provider  · Shaking chills  · Vomiting or nausea that doesn't go away  · Numbness, coldness, or tingling around the  incision area, or changes in skin color  · Opening of the sutures or wound  · Stitches or staples come apart or fall out or surgical tape falls off before 7 days or as directed by your healthcare provider   Date Last Reviewed: 10/16/2014  © 3165-9831 [employer]. [address] [place] [address]. All rights reserved. This information is not intended as a substitute for professional medical care. Always follow your healthcare professional's instructions.

## 2017-05-03 NOTE — BRIEF OP NOTE
[facility]  Brief Operative Note     SUMMARY     Surgery Date: 5/3/2017     Surgeon(s) and Role:     * Clive Morrell MD - Resident - Assisting     * Violette Broussard MD - Primary        Pre-op Diagnosis:  Ulnar neuropathy of left upper extremity [G56.22]    Post-op Diagnosis:  Post-Op Diagnosis Codes:     * Ulnar neuropathy of left upper extremity [G56.22]    Procedure(s) (LRB):  RELEASE-NERVE-ULNAR - LEFT ELBOW (Left)    Anesthesia: General    Description of the findings of the procedure: see op note    Findings/Key Components: see op note    Estimated Blood Loss: 5cc          Specimens:   Specimen     None          Discharge Note    SUMMARY     Admit Date: 5/3/2017    Discharge Date and Time:  05/03/2017 12:04 PM    Hospital Course    On the day of surgery, Brittany Park arrived to the day of surgery center. The patient was identified in the pre-operative area and the operative site was marked. All consents were verified. The patient was taken to the operative suite and underwent a left ulnar nerve decompression without complication. The patient tolerated the procedure well and was then taken to the PACU and monitored post-operatively. The patient's pain was controlled with oral medications and the decision was made to discharge the patient home with close follow up.     Follow up appointment scheduled for 2 weeks with Hamida   Weight Bearing Status: Non weight bearing operative extremity  Diet: Regular         Final Diagnosis: Post-Op Diagnosis Codes:     * Ulnar neuropathy of left upper extremity [G56.22]    Disposition: Home or Self Care    Follow Up/Patient Instructions:     Medications:  Reconciled Home Medications:   Current Discharge Medication List      START taking these medications    Details   oxycodone-acetaminophen (PERCOCET)  mg per tablet Take 1 tablet by mouth every 4 (four) hours as needed for Pain.  Qty: 40 tablet, Refills: 0         CONTINUE these  medications which have NOT CHANGED    Details   alprazolam (XANAX) 0.25 MG tablet Take 0.25 mg by mouth 2 (two) times daily as needed for Anxiety.      atorvastatin (LIPITOR) 40 MG tablet Take 40 mg by mouth once daily.      CALCIUM CARBONATE/VITAMIN D3 (CALCIUM WITH VITAMIN D ORAL) Take 1 tablet by mouth 2 (two) times daily.      cetirizine (ZYRTEC) 10 MG tablet Take 10 mg by mouth once daily.      duloxetine (CYMBALTA) 30 MG capsule Take 2 capsules (60 mg total) by mouth once daily.  Qty: 60 capsule, Refills: 5    Associated Diagnoses: Peripheral polyneuropathy; Depression, unspecified depression type      fesoterodine (TOVIAZ) 4 mg Tb24 Take 1 tablet (4 mg total) by mouth once daily.  Qty: 30 tablet, Refills: 11    Associated Diagnoses: Urge incontinence      furosemide (LASIX) 20 MG tablet Take 20 mg by mouth 2 (two) times daily.      gabapentin (NEURONTIN) 600 MG tablet TAKE ONE TABLET BY MOUTH THREE TIMES DAILY  Qty: 90 tablet, Refills: 5    Associated Diagnoses: Peripheral polyneuropathy; Pain in both feet      IBUPROFEN ORAL Take by mouth.      ketoconazole (NIZORAL) 2 % cream Apply topically once daily.      meloxicam (MOBIC) 7.5 MG tablet Take 7.5 mg by mouth once daily.       metformin (GLUCOPHAGE) 500 MG tablet Take 250 mg by mouth daily with breakfast.       metoprolol tartrate (LOPRESSOR) 50 MG tablet Take 50 mg by mouth 2 (two) times daily.       pantoprazole (PROTONIX) 40 MG tablet Take 40 mg by mouth once daily.       potassium chloride 10% (KAYCIEL) 20 mEq/15 mL solution TAKE 15 ML'S BY MOUTH DAILY  Qty: 473 mL, Refills: 0    Associated Diagnoses: Hypokalemia      spironolactone (ALDACTONE) 25 MG tablet Take 25 mg by mouth once daily.      aspirin (ECOTRIN) 81 MG EC tablet Take 81 mg by mouth once daily.      multivitamin (ONE DAILY MULTIVITAMIN) per tablet Take 1 tablet by mouth once daily.      UNKNOWN TO PATIENT Apply 1 application topically once daily.             Discharge Procedure  Orders  [facility] FOR HOME USE     Diet general     Call MD for:  temperature >100.4     Call MD for:  persistent nausea and vomiting     Call MD for:  severe uncontrolled pain     Call MD for:  difficulty breathing, headache or visual disturbances     Call MD for:  redness, tenderness, or signs of infection (pain, swelling, redness, odor or green/yellow discharge around incision site)     Call MD for:  hives     Call MD for:  persistent dizziness or light-headedness     Call MD for:  extreme fatigue     Keep surgical extremity elevated     No driving, operating heavy equipment or signing legal documents while taking pain medication.     Leave dressing on - Keep it clean, dry, and intact until clinic visit       Follow-up Information     Follow up with Viviana Mei PA-C In 2 weeks.    Specialties:  Hand Surgery, Orthopedic Surgery    Why:  For suture removal, For wound re-check    Contact information:    [address]  [facility] [address]  [phone]

## 2017-05-03 NOTE — TRANSFER OF CARE
"Anesthesia Transfer of Care Note    Patient: Brittany Park    Procedure(s) Performed: Procedure(s) (LRB):  RELEASE-NERVE-ULNAR - LEFT ELBOW (Left)    Patient location: PACU    Anesthesia Type: general    Transport from OR: Transported from OR on 6-10 L/min O2 by face mask with adequate spontaneous ventilation    Post pain: adequate analgesia    Post assessment: no apparent anesthetic complications and tolerated procedure well    Post vital signs: stable    Level of consciousness: alert, awake and oriented    Nausea/Vomiting: no nausea/vomiting    Complications: none          Last vitals:   Visit Vitals    BP (!) 94/56 (BP Location: Right arm, Patient Position: Lying, BP Method: Automatic)    Pulse 61    Temp 36.7 °C (98 °F) (Oral)    Resp 18    Ht 5' 2" (1.575 m)    Wt 112.9 kg (249 lb)    LMP 11/20/1998    SpO2 95%    Breastfeeding No    BMI 45.54 kg/m2     "

## 2017-05-03 NOTE — ANESTHESIA PREPROCEDURE EVALUATION
05/03/2017  Brittany Park is a 57 y.o., female.    Anesthesia Evaluation         Review of Systems  Anesthesia Hx:  No problems with previous Anesthesia    Social:  Non-Smoker, No Alcohol Use    Cardiovascular:   Hypertension hyperlipidemia    Pulmonary:   Sleep Apnea, CPAP    Hepatic/GI:   GERD    Neurological:   Peripheral Neuropathy    Psych:   anxiety          Physical Exam  General:  Morbid Obesity    Airway/Jaw/Neck:  Airway Findings: Mouth Opening: Normal Tongue: Normal  General Airway Assessment: Adult  Mallampati: II  TM Distance: Normal, at least 6 cm      Dental:  Dental Findings: In tact        Mental Status:  Mental Status Findings:  Alert and Oriented, Cooperative         Anesthesia Plan  Type of Anesthesia, risks & benefits discussed:  Anesthesia Type:  general, MAC  Patient's Preference:   Intra-op Monitoring Plan:   Intra-op Monitoring Plan Comments:   Post Op Pain Control Plan:   Post Op Pain Control Plan Comments:   Induction:   IV  Beta Blocker:  Patient is on a Beta-Blocker and has received one dose within the past 24 hours (No further documentation required).       Informed Consent: Patient understands risks and agrees with Anesthesia plan.  Questions answered. Anesthesia consent signed with patient.  ASA Score: 3     Day of Surgery Review of History & Physical:    H&P update referred to the surgeon.         Ready For Surgery From Anesthesia Perspective.     Consented for post-op nerve block if needed.  GLEN Sadler MD  Staff Anesthesiologist  [facility] Acute Pain Service

## 2017-05-03 NOTE — ANESTHESIA PROCEDURE NOTES
Supraclavicular Single Shot Block    Patient location during procedure: pre-op   Block not for primary anesthetic.  Reason for block: at surgeon's request and post-op pain management   Post-op Pain Location: Left Arm Pain  Start time: 5/3/2017 12:03 PM  Timeout: 5/3/2017 12:01 PM   End time: 5/3/2017 12:12 PM  Staffing  Anesthesiologist: LIANA HAYES  Resident/CRNA: ELODIA STEPHENSON  Performed by: anesthesiologist and resident/CRNA   Preanesthetic Checklist  Completed: patient identified, site marked, surgical consent, pre-op evaluation, timeout performed, IV checked, risks and benefits discussed and monitors and equipment checked  Peripheral Block  Patient position: supine  Prep: ChloraPrep  Patient monitoring: heart rate, cardiac monitor, continuous pulse ox, continuous capnometry and frequent blood pressure checks  Block type: supraclavicular  Laterality: left  Injection technique: single shot  Needle  Needle type: Stimuplex   Needle gauge: 22 G  Needle length: 2 in  Needle localization: anatomical landmarks and ultrasound guidance   -ultrasound image captured on disc.  Assessment  Injection assessment: negative aspiration, negative parasthesia and local visualized surrounding nerve  Paresthesia pain: none  Heart rate change: no  Slow fractionated injection: yes  Medications:  Bolus administered: 30 mL of 0.5 bupivacaine  Epinephrine added: 3.75 mcg/mL (1/300,000)  Additional Notes  VSS. PACU RN monitoring vitals throughout procedure.  Patient tolerated procedure well.  Performed after nerve assessment by Dr. Brosusard

## 2017-05-05 NOTE — OP NOTE
DATE OF PROCEDURE:  05/03/2017.    SERVICE:  Orthopedics.    ATTENDING SURGEON:  Violette Broussard M.D.    RESIDENT SURGEON:  Clive Morrell M.D. (RES).    PREOPERATIVE DIAGNOSIS:  Left ulnar nerve compression at the elbow.    POSTOPERATIVE DIAGNOSIS:  Left ulnar nerve compression at the elbow.    PROCEDURES PERFORMED:  1.  Left ulnar nerve decompression at the elbow.  2.  Splint application.    ANESTHESIA:  General.    FLUIDS:  Lactated Ringer's.    BLOOD LOSS:  None.  No blood was given.    TOURNIQUET TIME:  Less than 1 hour.    PACKS AND DRAINS:  None.    IMPLANTS:  Clarix injectable.    COMPLICATIONS:  None.    INDICATIONS FOR PROCEDURE:  Ms. Park is a 57-year-old female with numbness,   tingling and pain that goes from her elbow down into her hand.  She had   positive provocative examination for cubital tunnel.  After much discussion with   the patient, she elected for surgical intervention.  Risks and benefits were   explained to the patient in clinic.  Consents were performed in clinic.    PROCEDURE IN DETAIL:  After the correct site was marked with the patient's   participation in the holding area, the patient was brought to the Operating   Room, placed in supine position and underwent general anesthesia.  Left upper   extremity was prepped and draped in normal sterile fashion.  A well-padded   sterile tourniquet was placed on the left upper extremity.  A time-out was   conducted for correct site, procedure and patient to be indicated.  IV   antibiotics were given to the patient preoperatively.  Incision was marked out   just posterior to medial epicondyle.  The arm was exsanguinated with an Esmarch.    Tourniquet was inflated to 250 mmHg.  Incision was made.  Careful dissection   was made down to the ulnar nerve, all the while protecting the medial   antebrachial cutaneous nerve.  The nerve was identified and it was completely   decompressed about the elbow.  The elbow was then placed through  range of   motion, showed it did not sublux.  The area was irrigated with copious amounts   of normal saline.  Vicryl closed the skin.  Clarix was then injected into the   area.  Monocryl closed the skin.  Dermabond closed the skin.  Sterile dressing   was applied.  Tourniquet had been deflated.  The patient was placed in a   well-padded long arm posterior splint, tolerated the procedure well, was brought   to the Recovery Room in stable condition.    POSTOPERATIVE PLAN FOR THIS PATIENT:  She is to keep the dressing clean, dry and   intact.  We will see her in 2 weeks' time and most likely will require therapy.    I did readjust the splint postoperatively because she said it was irritating   her.      LES/HN  dd: 05/05/2017 06:59:51 (CDT)  td: 05/05/2017 09:35:00 (CDT)  Doc ID   #[id number]  Job ID #[id number]    CC:

## 2017-05-15 ENCOUNTER — OFFICE VISIT (OUTPATIENT)
Dept: ORTHOPEDICS | Facility: CLINIC | Age: 57
End: 2017-05-15
Payer: COMMERCIAL

## 2017-05-15 VITALS
HEIGHT: 62 IN | WEIGHT: 249 LBS | DIASTOLIC BLOOD PRESSURE: 75 MMHG | RESPIRATION RATE: 20 BRPM | HEART RATE: 60 BPM | SYSTOLIC BLOOD PRESSURE: 137 MMHG | BODY MASS INDEX: 45.82 KG/M2

## 2017-05-15 DIAGNOSIS — G56.22 ULNAR NEUROPATHY AT ELBOW, LEFT: Primary | ICD-10-CM

## 2017-05-15 PROCEDURE — 99999 PR PBB SHADOW E&M-EST. PATIENT-LVL IV: CPT | Mod: PBBFAC,,, | Performed by: PHYSICIAN ASSISTANT

## 2017-05-15 PROCEDURE — 99024 POSTOP FOLLOW-UP VISIT: CPT | Mod: S$GLB,,, | Performed by: PHYSICIAN ASSISTANT

## 2017-05-15 NOTE — MR AVS SNAPSHOT
[facility]  [address], [address]  [facility] [address]  Phone: [phone]                  Brittany Park   5/15/2017 1:30 PM   Office Visit    Description:  Female : 1960   Provider:  Viviana Mei PA-C   Department:  [facility]           Reason for Visit     Post-op Evaluation           Diagnoses this Visit        Comments    Ulnar neuropathy at elbow, left    -  Primary            To Do List           Future Appointments        Provider Department Dept Phone    2017 3:20 PM Viviana Mei PA-C [facility] [phone]    2017 1:00 PM Tiera Johns NP [facility] Neurology [phone]      Goals (5 Years of Data)     None      [facility]     [facility] On Call Nurse Care Line -  Assistance  Unless otherwise directed by your provider, please contact [facility], our nurse care line that is available for  assistance.     Registered nurses in the [facility] On Call Center provide: appointment scheduling, clinical advisement, health education, and other advisory services.  Call: [phone] (toll free)               Medications           Message regarding Medications     Verify the changes and/or additions to your medication regime listed below are the same as discussed with your clinician today.  If any of these changes or additions are incorrect, please notify your healthcare provider.             Verify that the below list of medications is an accurate representation of the medications you are currently taking.  If none reported, the list may be blank. If incorrect, please contact your healthcare provider. Carry this list with you in case of emergency.           Current Medications     alprazolam (XANAX) 0.25 MG tablet Take 0.25 mg by mouth 2 (two) times daily as needed for Anxiety.    aspirin (ECOTRIN) 81 MG EC tablet Take 81 mg by mouth once daily.    atorvastatin (LIPITOR) 40 MG tablet Take 40 mg by  "mouth once daily.    CALCIUM CARBONATE/VITAMIN D3 (CALCIUM WITH VITAMIN D ORAL) Take 1 tablet by mouth 2 (two) times daily.    cetirizine (ZYRTEC) 10 MG tablet Take 10 mg by mouth once daily.    duloxetine (CYMBALTA) 30 MG capsule Take 2 capsules (60 mg total) by mouth once daily.    furosemide (LASIX) 20 MG tablet Take 20 mg by mouth 2 (two) times daily.    gabapentin (NEURONTIN) 600 MG tablet TAKE ONE TABLET BY MOUTH THREE TIMES DAILY    IBUPROFEN ORAL Take by mouth.    ketoconazole (NIZORAL) 2 % cream Apply topically once daily.    meloxicam (MOBIC) 7.5 MG tablet Take 7.5 mg by mouth once daily.     metformin (GLUCOPHAGE) 500 MG tablet Take 250 mg by mouth daily with breakfast.     metoprolol tartrate (LOPRESSOR) 50 MG tablet Take 50 mg by mouth 2 (two) times daily.     multivitamin (ONE DAILY MULTIVITAMIN) per tablet Take 1 tablet by mouth once daily.    pantoprazole (PROTONIX) 40 MG tablet Take 40 mg by mouth once daily.     potassium chloride 10% (KAYCIEL) 20 mEq/15 mL solution TAKE 15 ML'S BY MOUTH DAILY    spironolactone (ALDACTONE) 25 MG tablet Take 25 mg by mouth once daily.    UNKNOWN TO PATIENT Apply 1 application topically once daily.    oxycodone-acetaminophen (PERCOCET)  mg per tablet Take 1 tablet by mouth every 4 (four) hours as needed for Pain.           Clinical Reference Information           Your Vitals Were     BP Pulse Resp Height Weight Last Period    137/75 (BP Location: Right arm, Patient Position: Sitting, BP Method: Automatic) 60 20 5' 2" (1.575 m) 112.9 kg (249 lb) 11/20/1998 (Exact Date)    BMI                45.54 kg/m2          Blood Pressure          Most Recent Value    BP  137/75      Allergies as of 5/15/2017     Morphine    Latex, Natural Rubber      Immunizations Administered on Date of Encounter - 5/15/2017     None      Orders Placed During Today's Visit      Normal Orders This Visit    Ambulatory Referral to Physical/Occupational Therapy       Language Assistance " Services     ATTENTION: Language assistance services are available, free of charge. Please call [phone].      ATENCIÓN: Si habla marylin, tiene a penny disposición servicios gratuitos de asistencia lingüística. Llame al [phone].     CHÚ Ý: N?u b?n nói Ti?ng Vi?t, có các d?ch v? h? tr? ngôn ng? mi?n phí dành cho b?n. G?i s? [phone].         [facility] complies with applicable Federal civil rights laws and does not discriminate on the basis of race, color, national origin, age, disability, or sex.

## 2017-05-15 NOTE — PROGRESS NOTES
"Ms. Park is here today for a post-operative visit.she is 14 days status post UND left elbow by Dr. Broussard on 5/3/15. she reports that she is doing well, no numbness at all, but significant discomfort in splint after swelling went down.  Pain is 3/10.  she is not taking pain medication . she denies fever, chills, and sweats since the time of the surgery.     Physical exam:    Vitals:    05/15/17 1353   BP: 137/75   Pulse: 60   Resp: 20   Weight: 112.9 kg (249 lb)   Height: 5' 2" (1.575 m)   PainSc:   3   PainLoc: Elbow     Post op dressing taken down.  Incision is clean, dry and intact.  There is no erythema or exudate.  There is no sign of any infection. she is NVI. Sutures removed without difficulty.      No edema had - Full ROM elbow wrist and fingers    Assessment:14 days status post UND left elbow     Plan:  Brittany was seen today for post-op evaluation.    Diagnoses and all orders for this visit:    Ulnar neuropathy at elbow, left  -     Ambulatory Referral to Physical/Occupational Therapy        -PO instruction reviewed and provided to patient  -start  Therapy - [facility] is close to home  -f/u 4 weeks  -no work 4 weeks        "

## 2017-06-30 ENCOUNTER — OFFICE VISIT (OUTPATIENT)
Dept: ORTHOPEDICS | Facility: CLINIC | Age: 57
End: 2017-06-30
Payer: COMMERCIAL

## 2017-06-30 VITALS
RESPIRATION RATE: 20 BRPM | BODY MASS INDEX: 45.82 KG/M2 | HEART RATE: 71 BPM | DIASTOLIC BLOOD PRESSURE: 7 MMHG | HEIGHT: 62 IN | SYSTOLIC BLOOD PRESSURE: 114 MMHG | WEIGHT: 249 LBS

## 2017-06-30 DIAGNOSIS — R29.898 HAND WEAKNESS: Primary | ICD-10-CM

## 2017-06-30 DIAGNOSIS — N39.41 URGE INCONTINENCE: ICD-10-CM

## 2017-06-30 DIAGNOSIS — Z12.31 ENCOUNTER FOR SCREENING MAMMOGRAM FOR BREAST CANCER: Primary | ICD-10-CM

## 2017-06-30 PROCEDURE — 99999 PR PBB SHADOW E&M-EST. PATIENT-LVL IV: CPT | Mod: PBBFAC,,, | Performed by: PHYSICIAN ASSISTANT

## 2017-06-30 PROCEDURE — 99024 POSTOP FOLLOW-UP VISIT: CPT | Mod: S$GLB,,, | Performed by: PHYSICIAN ASSISTANT

## 2017-06-30 RX ORDER — FESOTERODINE FUMARATE 4 MG/1
4 TABLET, EXTENDED RELEASE ORAL DAILY
Qty: 30 TABLET | Refills: 5 | Status: SHIPPED | OUTPATIENT
Start: 2017-06-30 | End: 2017-09-29

## 2017-06-30 NOTE — PROGRESS NOTES
"Ms. Park is here today for a post-operative visit.she is 6 weeks status post UND left elbow by Dr. Broussard on 5/3/15. she reports that she is doing well, no numbness at all. Has weakness and doesn't feel like she can do her job as a  .  Pain is 4/10.  she is not taking pain medication . she denies fever, chills, and sweats since the time of the surgery.   Pain radial side of thumb is new    Physical exam:    Vitals:    06/30/17 1257   BP: (!) 114/7   Pulse: 71   Resp: 20   Weight: 112.9 kg (249 lb)   Height: 5' 2" (1.575 m)   PainSc:   4   PainLoc: Elbow     Scar is clean, dry and intact.  There is no erythema or exudate.  There is no sign of any infection. she is NVI.     No edema had - Full ROM elbow wrist and fingers    TTP MCP radial thumb    Assessment:6 weeks status post UND left elbow     Plan:  Brittany was seen today for post-op evaluation.    Diagnoses and all orders for this visit:    Hand weakness  -     Ambulatory Referral to Physical/Occupational Therapy        -continue therapy for strengthening  -f/u 6 weeks.  -should return full duty at that time      "

## 2017-06-30 NOTE — TELEPHONE ENCOUNTER
Brittany desire refill of Toviaz. Annual exam scheduled.   Patient Active Problem List   Diagnosis    Hypertension    Peripheral polyneuropathy    Neuralgia and neuritis    Pain in both feet    Left arm pain    Arm paresthesia, left     Prior to Admission medications    Medication Sig Start Date End Date Taking? Authorizing Provider   alprazolam (XANAX) 0.25 MG tablet Take 0.25 mg by mouth 2 (two) times daily as needed for Anxiety.    Historical Provider, MD   aspirin (ECOTRIN) 81 MG EC tablet Take 81 mg by mouth once daily.    Historical Provider, MD   atorvastatin (LIPITOR) 40 MG tablet Take 40 mg by mouth once daily.    Historical Provider, MD   CALCIUM CARBONATE/VITAMIN D3 (CALCIUM WITH VITAMIN D ORAL) Take 1 tablet by mouth 2 (two) times daily.    Historical Provider, MD   cetirizine (ZYRTEC) 10 MG tablet Take 10 mg by mouth once daily.    Historical Provider, MD   duloxetine (CYMBALTA) 30 MG capsule Take 2 capsules (60 mg total) by mouth once daily. 3/22/17 3/22/18  Tiera Johns, NP   furosemide (LASIX) 20 MG tablet Take 20 mg by mouth 2 (two) times daily.    Historical Provider, MD   gabapentin (NEURONTIN) 600 MG tablet TAKE ONE TABLET BY MOUTH THREE TIMES DAILY 4/11/17   Tiera Johns, NP   IBUPROFEN ORAL Take by mouth.    Historical Provider, MD   ketoconazole (NIZORAL) 2 % cream Apply topically once daily.    Historical Provider, MD   meloxicam (MOBIC) 7.5 MG tablet Take 7.5 mg by mouth once daily.  11/20/12   Historical Provider, MD   metformin (GLUCOPHAGE) 500 MG tablet Take 250 mg by mouth daily with breakfast.     Historical Provider, MD   metoprolol tartrate (LOPRESSOR) 50 MG tablet Take 50 mg by mouth 2 (two) times daily.  10/17/12   Historical Provider, MD   multivitamin (ONE DAILY MULTIVITAMIN) per tablet Take 1 tablet by mouth once daily.    Historical Provider, MD   oxycodone-acetaminophen (PERCOCET)  mg per tablet Take 1 tablet by mouth every 4 (four) hours as needed for  Pain. 5/3/17   Clive Morrell MD   pantoprazole (PROTONIX) 40 MG tablet Take 40 mg by mouth once daily.  3/1/17   Historical Provider, MD   potassium chloride 10% (KAYCIEL) 20 mEq/15 mL solution TAKE 15 ML'S BY MOUTH DAILY 12/29/16   Tiffani Machado MD   spironolactone (ALDACTONE) 25 MG tablet Take 25 mg by mouth once daily. 6/23/16   Historical Provider, MD   UNKNOWN TO PATIENT Apply 1 application topically once daily.    Historical Provider, MD

## 2017-08-01 ENCOUNTER — OFFICE VISIT (OUTPATIENT)
Dept: NEUROLOGY | Facility: CLINIC | Age: 57
End: 2017-08-01
Payer: COMMERCIAL

## 2017-08-01 VITALS
HEIGHT: 62 IN | DIASTOLIC BLOOD PRESSURE: 84 MMHG | WEIGHT: 265.44 LBS | RESPIRATION RATE: 16 BRPM | BODY MASS INDEX: 48.85 KG/M2 | HEART RATE: 64 BPM | SYSTOLIC BLOOD PRESSURE: 130 MMHG

## 2017-08-01 DIAGNOSIS — R20.2 ARM PARESTHESIA, LEFT: ICD-10-CM

## 2017-08-01 DIAGNOSIS — M79.671 PAIN IN BOTH FEET: ICD-10-CM

## 2017-08-01 DIAGNOSIS — G62.9 PERIPHERAL POLYNEUROPATHY: Primary | ICD-10-CM

## 2017-08-01 DIAGNOSIS — F32.A DEPRESSION, UNSPECIFIED DEPRESSION TYPE: ICD-10-CM

## 2017-08-01 DIAGNOSIS — M79.672 PAIN IN BOTH FEET: ICD-10-CM

## 2017-08-01 PROCEDURE — 99999 PR PBB SHADOW E&M-EST. PATIENT-LVL IV: CPT | Mod: PBBFAC,,, | Performed by: NURSE PRACTITIONER

## 2017-08-01 PROCEDURE — 99214 OFFICE O/P EST MOD 30 MIN: CPT | Mod: S$GLB,,, | Performed by: NURSE PRACTITIONER

## 2017-08-01 RX ORDER — GABAPENTIN 600 MG/1
600 TABLET ORAL 3 TIMES DAILY
Qty: 90 TABLET | Refills: 5 | Status: SHIPPED | OUTPATIENT
Start: 2017-08-01 | End: 2018-01-19 | Stop reason: SDUPTHER

## 2017-08-01 RX ORDER — DULOXETIN HYDROCHLORIDE 30 MG/1
60 CAPSULE, DELAYED RELEASE ORAL DAILY
Qty: 60 CAPSULE | Refills: 11 | Status: SHIPPED | OUTPATIENT
Start: 2017-08-01 | End: 2018-04-16 | Stop reason: SDUPTHER

## 2017-08-01 NOTE — PROGRESS NOTES
HPI:  Brittany Park is a 56 y.o. female with polyneuropathy affecting the hands and feet, as well as mild right CTS. She is S/P left CTR, and S/P left ulnar nerve release.     She was referred to Dr. Broussard at her last visit for ongoing left elbow complaints, and did have a left ulnar nerve release, which was very effective at resolving her paresthesias. She continues with some difficulty with manipulating items with her left hand/arm, for which she is receiving physical therapy for.     Her Cymbalta was increased at her last visit in an attempt to better control her neuropathic complaints, in addition to Gabapentin and compound cream. Her foot pain is ongoing, but is now tolerable.     Her mood has improved further with the Cymbalta increase.       Review of Systems   Constitutional: Negative for fever.   Eyes: Negative for blurred vision and double vision.   Respiratory: Negative for hemoptysis.    Cardiovascular: Negative for leg swelling.   Gastrointestinal: Negative for blood in stool.   Genitourinary: Negative for hematuria.   Musculoskeletal: Negative for back pain and falls.   Skin: Negative for rash.   Neurological: Positive for tingling and sensory change. Negative for dizziness, weakness and headaches.   Psychiatric/Behavioral: Negative for depression. The patient does not have insomnia.    Some left knee pain for years. She can talk to ortho about this as discussed    Exam:  Gen Appearance, well developed/nourished in no apparent distress  CV: 2+ distal pulses with no edema or swelling  Neuro:  MS: Awake, alert,Sustains attention. Recent/remote memory intact, Language is full to spontaneous speech/comprehension. Fund of Knowledge is full  CN: Optic discs are flat with normal vasculature, PERRL, Extraoccular movements and visual fields are full. Normal facial sensation and strength,  Tongue and Palate are midline and strong. Shoulder Shrug symmetric and strong.  Motor: Normal bulk, tone, no  abnormal movements. 5/5 strength bilateral upper/lower extremities with 2+ reflexes in the arms and 1+ at the knees and none at the knees   Sensory: symmetric to temp, pin and vibration.  Romberg negative  Cerebellar: Finger-nose,Heal-shin, Rapid alternating movements intact  Gait: Normal stance, no ataxia    EMG 7/2016:     1. Mild Right Carpal Tunnel Syndrome. (Resolution of Left CTS since 7/2015 EMG/NCS is noted)  2. 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 This UE findings is new since 7/2015 EMG/NCS  3. No evidence of ulnar neuropathy at the elbow.     Assessment/Plan: Brittany Park is a 56 y.o. female with polyneuropathy, as well as bilateral CTS. She is S/P left CTR. Her neuropathic complaints are improved since increasing Gabapentin to tid dosing.     I recommend:     1. Continue Cymbalta to 60 mg qd for neuropathy, as well as Gabapentin.  2. Continue neuropathic cream.   3. Continue with PT for LUE weakness.     FU 6 months.

## 2017-08-07 ENCOUNTER — OFFICE VISIT (OUTPATIENT)
Dept: ORTHOPEDICS | Facility: CLINIC | Age: 57
End: 2017-08-07
Payer: COMMERCIAL

## 2017-08-07 VITALS
WEIGHT: 265 LBS | RESPIRATION RATE: 20 BRPM | HEIGHT: 62 IN | HEART RATE: 66 BPM | SYSTOLIC BLOOD PRESSURE: 134 MMHG | BODY MASS INDEX: 48.76 KG/M2 | DIASTOLIC BLOOD PRESSURE: 76 MMHG

## 2017-08-07 DIAGNOSIS — R29.898 WEAKNESS OF LEFT UPPER EXTREMITY: ICD-10-CM

## 2017-08-07 DIAGNOSIS — M79.602 LEFT ARM PAIN: Primary | ICD-10-CM

## 2017-08-07 PROCEDURE — 99024 POSTOP FOLLOW-UP VISIT: CPT | Mod: S$GLB,,, | Performed by: PHYSICIAN ASSISTANT

## 2017-08-07 PROCEDURE — 99999 PR PBB SHADOW E&M-EST. PATIENT-LVL V: CPT | Mod: PBBFAC,,, | Performed by: PHYSICIAN ASSISTANT

## 2017-08-20 NOTE — PROGRESS NOTES
"Ms. Park is here today for a post-operative visit.  She is 3 months status post ulnar nerve decompression at the left elbow by Dr. Broussard on 5/3/17. She reports that she is doing well with improvement in hands and upper extremity function since surgery and starting occupational therapy.  Pain is mild, 3/10.  She is still experiencing some trouble "wringing out a mop" and does continue to experience mild pain with elbow and forearm motion.  He also reports some sensitivity or discomfort at her left elbow scar.  She denies fever, chills, and sweats since the time of the surgery.     Physical exam:    Vitals:    08/07/17 1100   BP: 134/76   Pulse: 66   Resp: 20   Weight: 120.2 kg (265 lb)   Height: 5' 2" (1.575 m)   PainSc:   3   PainLoc: Elbow     Vital signs are stable, patient is afebrile.  Patient is well dressed and well groomed, no acute distress.  Alert and oriented to person, place, and time.  Incision is healing well - clean, dry and intact.  Mildly tender to palpation.  There is no erythema or exudate.  There is no sign of any infection. She is NVI.  Good range of motion of the elbow, wrist, and fingers.    Assessment: 3 months status post ulnar nerve decompression at the left elbow    Plan:  Brittany was seen today for post-op evaluation.    Diagnoses and all orders for this visit:    Left arm pain  -     Ambulatory Referral to Physical/Occupational Therapy    Weakness of left upper extremity  -     Ambulatory Referral to Physical/Occupational Therapy        - She will follow up as needed in clinic.  - Continue occupational therapy  - NSAIDs as needed for pain  - Call with any questions or concerns  "

## 2017-09-29 ENCOUNTER — TELEPHONE (OUTPATIENT)
Dept: OBSTETRICS AND GYNECOLOGY | Facility: CLINIC | Age: 57
End: 2017-09-29

## 2017-09-29 ENCOUNTER — HOSPITAL ENCOUNTER (OUTPATIENT)
Dept: RADIOLOGY | Facility: HOSPITAL | Age: 57
Discharge: HOME | End: 2017-09-29
Attending: OBSTETRICS & GYNECOLOGY
Payer: COMMERCIAL

## 2017-09-29 ENCOUNTER — OFFICE VISIT (OUTPATIENT)
Dept: OBSTETRICS AND GYNECOLOGY | Facility: CLINIC | Age: 57
End: 2017-09-29
Payer: COMMERCIAL

## 2017-09-29 VITALS
HEIGHT: 62 IN | BODY MASS INDEX: 48.4 KG/M2 | RESPIRATION RATE: 18 BRPM | HEART RATE: 78 BPM | HEIGHT: 62 IN | WEIGHT: 265 LBS | WEIGHT: 263 LBS | SYSTOLIC BLOOD PRESSURE: 120 MMHG | BODY MASS INDEX: 48.76 KG/M2 | DIASTOLIC BLOOD PRESSURE: 90 MMHG

## 2017-09-29 DIAGNOSIS — R32 INCONTINENCE IN FEMALE: ICD-10-CM

## 2017-09-29 DIAGNOSIS — N95.1 MENOPAUSAL STATE: ICD-10-CM

## 2017-09-29 DIAGNOSIS — Z01.419 WELL WOMAN EXAM WITH ROUTINE GYNECOLOGICAL EXAM: Primary | ICD-10-CM

## 2017-09-29 DIAGNOSIS — Z12.31 ENCOUNTER FOR SCREENING MAMMOGRAM FOR BREAST CANCER: ICD-10-CM

## 2017-09-29 PROCEDURE — 77063 BREAST TOMOSYNTHESIS BI: CPT | Mod: 26,,, | Performed by: RADIOLOGY

## 2017-09-29 PROCEDURE — 77067 SCR MAMMO BI INCL CAD: CPT | Mod: 26,,, | Performed by: RADIOLOGY

## 2017-09-29 PROCEDURE — 77067 SCR MAMMO BI INCL CAD: CPT | Mod: TC

## 2017-09-29 PROCEDURE — 99999 PR PBB SHADOW E&M-EST. PATIENT-LVL III: CPT | Mod: PBBFAC,,, | Performed by: OBSTETRICS & GYNECOLOGY

## 2017-09-29 PROCEDURE — 99396 PREV VISIT EST AGE 40-64: CPT | Mod: S$GLB,,, | Performed by: OBSTETRICS & GYNECOLOGY

## 2017-09-29 NOTE — TELEPHONE ENCOUNTER
Attempted to schedule appointment with Dr. Velez. Answering service states office is closed until Monday. Patient notified.

## 2017-09-29 NOTE — PROGRESS NOTES
Subjective:    Patient ID: Brittany Park is a 57 y.o. female.     Chief Complaint: Annual Well Woman Exam     History of Present Illness:  Brittany presents today for Annual Well Woman exam. .Patient's last menstrual period was 1998 (exact date).. She is currently using no hormone replacement therapy and she reports no problems with hot flashes, night sweats, irritability and vaginal dryness. She denies breast tenderness, masses, nipple discharge.  She reports continued incontinence of urine. She was treated with Myrbetriq for incontinence which appeared to be due ton urgency, however, she states she got no relief form the medicatino. She states that when she coughs or sneezes, her entire bladder empties. She says it is not just a squirt.  Bowel movements have not significantly changed.    Menstrual History:   Patient's last menstrual period was 1998 (exact date).    OB History      Para Term  AB Living    3 3 3          SAB TAB Ectopic Multiple Live Births                       Review of Systems   Constitutional: Negative for activity change, appetite change, chills, diaphoresis, fatigue, fever and unexpected weight change.   HENT: Negative for mouth sores and tinnitus.    Eyes: Negative for discharge and visual disturbance.   Respiratory: Negative for cough, shortness of breath and wheezing.    Cardiovascular: Negative for chest pain, palpitations and leg swelling.   Gastrointestinal: Negative for abdominal pain, blood in stool, constipation, diarrhea, nausea and vomiting.   Endocrine: Negative for diabetes, hair loss, hot flashes, hyperthyroidism and hypothyroidism.   Genitourinary: Positive for urinary incontinence. Negative for decreased libido, dyspareunia, dysuria, flank pain, frequency, genital sores, hematuria, menorrhagia, menstrual problem, pelvic pain, urgency, vaginal bleeding, vaginal discharge, vaginal pain, postcoital bleeding and vaginal odor.   Musculoskeletal: Negative  for back pain, joint swelling and myalgias.   Skin:  Negative for rash, no acne and hair changes.   Neurological: Negative for seizures, syncope, numbness and headaches.   Hematological: Negative for adenopathy. Does not bruise/bleed easily.   Psychiatric/Behavioral: Negative for sleep disturbance. The patient is not nervous/anxious.    Breast: Negative for breast pain and nipple discharge        Objective:    Vital Signs:  Vitals:    09/29/17 1451   BP: (!) 120/90   Pulse: 78   Resp: 18       Physical Exam:  General:  alert,normal appearing gravid female   Skin:  Skin color, texture, turgor normal. No rashes or lesions   HEENT:  conjunctivae/corneas clear. PERRL.   Neck: supple, trachea midline, no adenopathy or thyromegally   Respiratory:  clear to auscultation bilaterally   Heart:  regular rate and rhythm, S1, S2 normal, no murmur, click, rub or gallop   Breasts:   Nipples are protruding and have no nipple discharge. No palpable masses, erythema, skin changes, tenderness, or adenopathy.   Abdomen:  soft, non-tender. Bowel sounds normal. No masses,  no organomegaly   Pelvis: External genitalia: normal general appearance  Urinary system: urethral meatus normal, bladder nontender  Vaginal: rectocele present, grade 2. there is minimal anterior compartment relaxation.  Cervix: removed surgically  Uterus: removed surgically  Adnexa: non palpable   Extremities: Normal ROM; no edema, no cyanosis   Neurologial: Normal strength and tone. No focal numbness or weakness. Reflexes 2+ and equal.   Psychiatric: normal mood, speech, dress, and thought processes         Assessment:      1. Well woman exam with routine gynecological exam    2. Incontinence in female    3. Menopausal state          Plan:      Well woman exam with routine gynecological exam    Incontinence in female  -     Ambulatory Referral to Urology    Menopausal state      I think she needs to have a cystometrogram for evaluation of her urinary incontinence. I  will refer to urology for evaluation.    COUNSELING:  Brittany was counseled on A.C.O.G. Pap guidelines and recommendations for yearly pelvic exams in addition to recommendations for yearly mammograms and monthly self breast exams. In addition she was counseled on adequate intake of calcium and vitamin D; to see her PCP for other health maintenance.

## 2017-10-03 NOTE — TELEPHONE ENCOUNTER
Spoke with patient who states she will contact her insurance company to determine which Urologist is covered. Patient states she will call back with information at her convenience.

## 2018-01-19 DIAGNOSIS — G62.9 PERIPHERAL POLYNEUROPATHY: ICD-10-CM

## 2018-01-19 DIAGNOSIS — M79.671 PAIN IN BOTH FEET: ICD-10-CM

## 2018-01-19 DIAGNOSIS — M79.672 PAIN IN BOTH FEET: ICD-10-CM

## 2018-01-24 RX ORDER — GABAPENTIN 600 MG/1
600 TABLET ORAL 3 TIMES DAILY
Qty: 90 TABLET | Refills: 5 | Status: SHIPPED | OUTPATIENT
Start: 2018-01-24 | End: 2018-07-20 | Stop reason: SDUPTHER

## 2018-04-09 ENCOUNTER — TELEPHONE (OUTPATIENT)
Dept: NEUROLOGY | Facility: CLINIC | Age: 58
End: 2018-04-09

## 2018-04-09 DIAGNOSIS — G62.9 PERIPHERAL POLYNEUROPATHY: ICD-10-CM

## 2018-04-09 DIAGNOSIS — M79.672 PAIN IN BOTH FEET: ICD-10-CM

## 2018-04-09 DIAGNOSIS — M79.671 PAIN IN BOTH FEET: ICD-10-CM

## 2018-04-09 DIAGNOSIS — F32.A DEPRESSION, UNSPECIFIED DEPRESSION TYPE: ICD-10-CM

## 2018-04-09 RX ORDER — DULOXETIN HYDROCHLORIDE 30 MG/1
60 CAPSULE, DELAYED RELEASE ORAL DAILY
Qty: 60 CAPSULE | Refills: 11 | Status: CANCELLED | OUTPATIENT
Start: 2018-04-09 | End: 2019-04-09

## 2018-04-09 NOTE — TELEPHONE ENCOUNTER
----- Message from Maira Delaney sent at 2018  2:53 PM CDT -----  Contact: [facility]  Brittany Park  MRN: [id number]  : 1960  PCP: Clyde Almeida  Home Phone      [phone]  Work Phone      Not on file.  Mobile          [phone]      MESSAGE: Patient needs a refill on Duloxetine 30 mg 1 daily sent to [facility].

## 2018-04-10 ENCOUNTER — HOSPITAL ENCOUNTER (OUTPATIENT)
Dept: RADIOLOGY | Facility: HOSPITAL | Age: 58
Discharge: HOME | End: 2018-04-10
Attending: PSYCHIATRY & NEUROLOGY
Payer: COMMERCIAL

## 2018-04-10 ENCOUNTER — OFFICE VISIT (OUTPATIENT)
Dept: NEUROLOGY | Facility: CLINIC | Age: 58
End: 2018-04-10
Payer: COMMERCIAL

## 2018-04-10 VITALS
HEIGHT: 62 IN | WEIGHT: 267.88 LBS | BODY MASS INDEX: 49.3 KG/M2 | DIASTOLIC BLOOD PRESSURE: 66 MMHG | SYSTOLIC BLOOD PRESSURE: 124 MMHG | RESPIRATION RATE: 20 BRPM | HEART RATE: 72 BPM

## 2018-04-10 DIAGNOSIS — M65.349 TRIGGER RING FINGER, UNSPECIFIED LATERALITY: ICD-10-CM

## 2018-04-10 DIAGNOSIS — G62.9 PERIPHERAL POLYNEUROPATHY: Primary | ICD-10-CM

## 2018-04-10 DIAGNOSIS — E66.01 MORBID OBESITY WITH BMI OF 45.0-49.9, ADULT: ICD-10-CM

## 2018-04-10 DIAGNOSIS — M25.561 ACUTE PAIN OF BOTH KNEES: ICD-10-CM

## 2018-04-10 DIAGNOSIS — M25.562 ACUTE PAIN OF BOTH KNEES: ICD-10-CM

## 2018-04-10 DIAGNOSIS — R73.01 IMPAIRED FASTING GLUCOSE: ICD-10-CM

## 2018-04-10 PROCEDURE — 99214 OFFICE O/P EST MOD 30 MIN: CPT | Mod: S$GLB,,, | Performed by: PSYCHIATRY & NEUROLOGY

## 2018-04-10 PROCEDURE — 3078F DIAST BP <80 MM HG: CPT | Mod: CPTII,S$GLB,, | Performed by: PSYCHIATRY & NEUROLOGY

## 2018-04-10 PROCEDURE — 73562 X-RAY EXAM OF KNEE 3: CPT | Mod: 50,TC

## 2018-04-10 PROCEDURE — 73562 X-RAY EXAM OF KNEE 3: CPT | Mod: 26,50,, | Performed by: RADIOLOGY

## 2018-04-10 PROCEDURE — 99999 PR PBB SHADOW E&M-EST. PATIENT-LVL III: CPT | Mod: PBBFAC,,, | Performed by: PSYCHIATRY & NEUROLOGY

## 2018-04-10 PROCEDURE — 3074F SYST BP LT 130 MM HG: CPT | Mod: CPTII,S$GLB,, | Performed by: PSYCHIATRY & NEUROLOGY

## 2018-04-10 NOTE — PROGRESS NOTES
HPI:  Brittany Park is a 58 y.o. female with numbness and tingling in the bottoms of both feet and  left hand tingling. 5/2015 EMG showed  Mild Distal sensory and motor axonal neuropathy in the feet, Mild Right Carpal Tunnel Syndrome, and Severe Left Carpal Tunnel Syndrome (now s/p CTR on the left)      In 2016, pain management did not recommend any intervention for her better controlled symptoms  She has been seeing MAX Mott in Neurology. Her Prozac was switched to Cymbalta and she was referred back to ortho for her ulnar complaints. She saw Dr Mccracken and had  a left ulnar nerve and left CTR release, which was very effective at resolving her paresthesias.    She returns today stating her bilateral 4th fingers are locking up and she has pop them back and she has pain at the MCP joint with those fingers.  Numb fingers have greatly improved- rare episode of numbness since surgery.     She currently has burning in the feet which is worse over time than prior.   She feels popping in the knees at times and feels weaker at the knee joints bilateral with pain with climbing up steps. This has occurred for the past few months  She feels back pain with mopping and ironing  She continues Cymbalta and gabapentin for pain which she does know helps.   Reports improved fasting glucose    Review of Systems   Constitutional: Negative for fever.   HENT: Negative for nosebleeds.    Eyes: Positive for blurred vision. Negative for double vision.        Some blurred vision bilaterally episodically and she was informed to have a routine eye exam   Respiratory: Negative for hemoptysis.    Cardiovascular: Positive for leg swelling.        Treated by PCP for edema   Gastrointestinal: Negative for blood in stool.   Genitourinary: Negative for hematuria.   Musculoskeletal: Positive for back pain.   Skin: Negative for rash.   Neurological: Positive for sensory change.   Psychiatric/Behavioral: Negative for memory loss.   Some left knee pain  for years. She can talk to ortho about this as discussed            Exam:  Gen Appearance, well developed/nourished in no apparent distress  CV: 2+ distal pulses with no edema or swelling  Neuro:  MS: Awake, alert,Sustains attention. Recent/remote memory intact, Language is full to spontaneous speech/comprehension. Fund of Knowledge is full  CN: Optic discs are flat with normal vasculature, PERRL, Extraoccular movements and visual fields are full. Normal facial sensation and strength,  Tongue and Palate are midline and strong. Shoulder Shrug symmetric and strong.  Motor: Normal bulk, tone, no abnormal movements. 5/5 strength bilateral upper/lower extremities with 2+ reflexes in the arms and 1+ at the knees and none at the knees   Sensory: symmetric to temp, pin and vibration  Romberg negative  Cerebellar: Finger-nose,Heal-shin, Rapid alternating movements intact  Gait: Normal stance, no ataxia      Imaging: MRI L spine 3/2016: Very minimal spondylosis of the lower lumbar spine without central canal stenosis or neural foraminal narrowing.    EMG 2016: . Mild Right Carpal Tunnel Syndrome. (Resolution of Left CTS since 7/2015 EMG/NCS is noted)  2. 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 This UE findings is new since 7/2015 EMG/NCS  3. No evidence of ulnar neuropathy at the elbow.       Assessment/Plan: Brittany Park is a 58 y.o. female with numbness and tingling in the bottoms of both feet and  left hand tingling. 5/2015 EMG showed  Mild Distal sensory and motor axonal neuropathy in the feet, Mild Right Carpal Tunnel Syndrome, and Severe Left Carpal Tunnel Syndrome (now s/p CTR on the left, right CTR and Ulnar nerve releast)    I recommend:       1. Her podiatrist, Dr Posey, had noted some possible tarsal signs and her  flat footed findings. Patient may benefit from tibial injections per him, but this was never done. She has  not benefited from inserts to date or Metanx.  2. In 2016, pain management did not recommend any intervention for her better controlled symptoms  EMG is not fully reliable to rule out tarsal tunnel syndrome conditions, especially with co-existing neuropathy. Intervention if needed at at later date may also help her  Polyneuropathy. Consider referral back to pain management if neuropathy pain is worse over time (she will notify me if so): Continue Cymbalta to 60 mg qd for neuropathy, as well as Gabapentin and  neuropathic cream.  3. MRI L spine 2016 shows very minimal spondylosis -- back pain is episodic.  She was encouraged to reduce morbid obesity.   4. She will continue to follow fasting glucose/ now on metformin.   She also had viral GI illness prior to onset of symptoms. No conduction block pr demylination on NCS/ nothing to suggest AIDP/CIDP then  5. Bilateral knee xray bilaterally for weakness at that joint with pain there- may need ortho consult depending on the results.   6. Refer back to ortho, Dr Mccracken regarding her suspected trigger fingers of the 4th fingers bilaterally.     RTC 6 months

## 2018-04-16 DIAGNOSIS — G62.9 PERIPHERAL POLYNEUROPATHY: ICD-10-CM

## 2018-04-16 DIAGNOSIS — M79.672 PAIN IN BOTH FEET: ICD-10-CM

## 2018-04-16 DIAGNOSIS — M79.671 PAIN IN BOTH FEET: ICD-10-CM

## 2018-04-16 DIAGNOSIS — F32.A DEPRESSION, UNSPECIFIED DEPRESSION TYPE: ICD-10-CM

## 2018-04-16 RX ORDER — DULOXETIN HYDROCHLORIDE 30 MG/1
60 CAPSULE, DELAYED RELEASE ORAL DAILY
Qty: 60 CAPSULE | Refills: 11 | Status: SHIPPED | OUTPATIENT
Start: 2018-04-16 | End: 2020-02-10

## 2018-04-16 NOTE — TELEPHONE ENCOUNTER
----- Message from Maira Delaney sent at 2018  7:46 AM CDT -----  Contact: [facility]  Brittany Park  MRN: [id number]  : 1960  PCP: Clyde Almeida  Home Phone      [phone]  Work Phone      Not on file.  Mobile          [phone]      MESSAGE: Patient needs a refill of Duloxetine 30 mg, 1 daily sent to [facility].

## 2018-05-02 ENCOUNTER — TELEPHONE (OUTPATIENT)
Dept: ORTHOPEDICS | Facility: CLINIC | Age: 58
End: 2018-05-02

## 2018-05-02 DIAGNOSIS — M79.641 BILATERAL HAND PAIN: Primary | ICD-10-CM

## 2018-05-02 DIAGNOSIS — M79.642 BILATERAL HAND PAIN: Primary | ICD-10-CM

## 2018-05-02 NOTE — TELEPHONE ENCOUNTER
Brittany Park reminded of appointment on 5/3/18 with Dr. VALENTINA Broussard w/time and location. Notified of need for xray before OV w/date, time, and location of appts.    Per pt bilateral hand

## 2018-05-03 ENCOUNTER — OFFICE VISIT (OUTPATIENT)
Dept: ORTHOPEDICS | Facility: CLINIC | Age: 58
End: 2018-05-03
Payer: COMMERCIAL

## 2018-05-03 ENCOUNTER — HOSPITAL ENCOUNTER (OUTPATIENT)
Dept: RADIOLOGY | Facility: OTHER | Age: 58
Discharge: HOME | End: 2018-05-03
Attending: ORTHOPAEDIC SURGERY
Payer: COMMERCIAL

## 2018-05-03 VITALS
BODY MASS INDEX: 49.13 KG/M2 | HEIGHT: 62 IN | SYSTOLIC BLOOD PRESSURE: 133 MMHG | DIASTOLIC BLOOD PRESSURE: 81 MMHG | WEIGHT: 267 LBS | HEART RATE: 64 BPM

## 2018-05-03 DIAGNOSIS — M65.30 TRIGGER FINGER OF LEFT HAND, UNSPECIFIED FINGER: ICD-10-CM

## 2018-05-03 DIAGNOSIS — G56.22 LESION OF LEFT ULNAR NERVE: Primary | ICD-10-CM

## 2018-05-03 DIAGNOSIS — M65.30 TRIGGER FINGER OF RIGHT HAND, UNSPECIFIED FINGER: ICD-10-CM

## 2018-05-03 DIAGNOSIS — M79.642 BILATERAL HAND PAIN: ICD-10-CM

## 2018-05-03 DIAGNOSIS — M79.641 BILATERAL HAND PAIN: ICD-10-CM

## 2018-05-03 PROCEDURE — 3008F BODY MASS INDEX DOCD: CPT | Mod: CPTII,S$GLB,, | Performed by: ORTHOPAEDIC SURGERY

## 2018-05-03 PROCEDURE — 3075F SYST BP GE 130 - 139MM HG: CPT | Mod: CPTII,S$GLB,, | Performed by: ORTHOPAEDIC SURGERY

## 2018-05-03 PROCEDURE — 20550 NJX 1 TENDON SHEATH/LIGAMENT: CPT | Mod: 51,F8,S$GLB, | Performed by: ORTHOPAEDIC SURGERY

## 2018-05-03 PROCEDURE — 3079F DIAST BP 80-89 MM HG: CPT | Mod: CPTII,S$GLB,, | Performed by: ORTHOPAEDIC SURGERY

## 2018-05-03 PROCEDURE — 99999 PR PBB SHADOW E&M-EST. PATIENT-LVL III: CPT | Mod: PBBFAC,,, | Performed by: ORTHOPAEDIC SURGERY

## 2018-05-03 PROCEDURE — 76942 ECHO GUIDE FOR BIOPSY: CPT | Mod: 26,S$GLB,, | Performed by: ORTHOPAEDIC SURGERY

## 2018-05-03 PROCEDURE — 99214 OFFICE O/P EST MOD 30 MIN: CPT | Mod: 25,S$GLB,, | Performed by: ORTHOPAEDIC SURGERY

## 2018-05-03 PROCEDURE — 73130 X-RAY EXAM OF HAND: CPT | Mod: 26,50,, | Performed by: RADIOLOGY

## 2018-05-03 PROCEDURE — 73130 X-RAY EXAM OF HAND: CPT | Mod: 50,TC,FY

## 2018-05-03 RX ORDER — IBUPROFEN 100 MG/5ML
1000 SUSPENSION, ORAL (FINAL DOSE FORM) ORAL DAILY
COMMUNITY
End: 2020-02-10

## 2018-05-03 RX ADMIN — DEXAMETHASONE SODIUM PHOSPHATE 4 MG: 4 INJECTION, SOLUTION INTRA-ARTICULAR; INTRALESIONAL; INTRAMUSCULAR; INTRAVENOUS; SOFT TISSUE at 10:05

## 2018-05-03 NOTE — PROCEDURES
Tendon Sheath  Date/Time: 5/3/2018 10:49 AM  Performed by: JEWELS MIR  Authorized by: JEWELS MIR     Consent Done?:  Yes (Verbal)  Timeout: prior to procedure the correct patient, procedure, and site was verified    Indications:  Pain  Timeout: prior to procedure the correct patient, procedure, and site was verified    Location:  Ring finger  Site:  R ring MCP  Ultrasonic guidance for needle placement?: Yes    Needle size:  25 G  Approach:  Volar  Medications:  4 mg dexamethasone 4 mg/mL

## 2018-05-03 NOTE — PROCEDURES
Tendon Sheath  Date/Time: 5/3/2018 10:50 AM  Performed by: JEWELS MIR  Authorized by: JEWELS MIR     Consent Done?:  Yes (Verbal)  Timeout: prior to procedure the correct patient, procedure, and site was verified    Indications:  Pain  Timeout: prior to procedure the correct patient, procedure, and site was verified    Location:  Ring finger  Site:  L ring MCP  Prep: patient was prepped and draped in usual sterile fashion    Ultrasonic guidance for needle placement?: Yes    Needle size:  25 G  Approach:  Volar  Medications:  4 mg dexamethasone 4 mg/mL

## 2018-05-17 ENCOUNTER — OFFICE VISIT (OUTPATIENT)
Dept: ORTHOPEDICS | Facility: CLINIC | Age: 58
End: 2018-05-17
Payer: COMMERCIAL

## 2018-05-17 VITALS
HEIGHT: 62 IN | HEART RATE: 64 BPM | SYSTOLIC BLOOD PRESSURE: 123 MMHG | WEIGHT: 267 LBS | DIASTOLIC BLOOD PRESSURE: 72 MMHG | BODY MASS INDEX: 49.13 KG/M2

## 2018-05-17 DIAGNOSIS — M65.341 TRIGGER RING FINGER OF RIGHT HAND: ICD-10-CM

## 2018-05-17 DIAGNOSIS — M65.342 TRIGGER RING FINGER OF LEFT HAND: ICD-10-CM

## 2018-05-17 DIAGNOSIS — G56.22 LESION OF LEFT ULNAR NERVE: Primary | ICD-10-CM

## 2018-05-17 PROCEDURE — 3074F SYST BP LT 130 MM HG: CPT | Mod: CPTII,S$GLB,, | Performed by: ORTHOPAEDIC SURGERY

## 2018-05-17 PROCEDURE — 99213 OFFICE O/P EST LOW 20 MIN: CPT | Mod: S$GLB,,, | Performed by: ORTHOPAEDIC SURGERY

## 2018-05-17 PROCEDURE — 3008F BODY MASS INDEX DOCD: CPT | Mod: CPTII,S$GLB,, | Performed by: ORTHOPAEDIC SURGERY

## 2018-05-17 PROCEDURE — 99999 PR PBB SHADOW E&M-EST. PATIENT-LVL III: CPT | Mod: PBBFAC,,, | Performed by: ORTHOPAEDIC SURGERY

## 2018-05-17 PROCEDURE — 3078F DIAST BP <80 MM HG: CPT | Mod: CPTII,S$GLB,, | Performed by: ORTHOPAEDIC SURGERY

## 2018-05-17 NOTE — PROGRESS NOTES
Subjective:      Patient ID: Brittany Park is a 58 y.o. female.    Chief Complaint: Pain of the Right Hand and Pain of the Left Hand      HPI  Brittany Park is a 58 y.o. female presenting today for bl finger pain and left hand numbness. She notes pain and locking of both ring fingers. This began about two months ago. It is painful. She also notes numbness in the ulnar distribution of the left hand for a few weeks, It is intermittent. Of note, she is s/p left ulnar nerve release by Dr. Broussard 5/3/17. OT was ordered at last visit which she has not yet begun. We also did bl ring trigger finger injections, she states this provided some relief but is still triggering.         Review of patient's allergies indicates:   Allergen Reactions    Morphine Itching    Latex, natural rubber Rash         Current Outpatient Prescriptions   Medication Sig Dispense Refill    alprazolam (XANAX) 0.25 MG tablet Take 0.25 mg by mouth 2 (two) times daily as needed for Anxiety.      ascorbic acid, vitamin C, (VITAMIN C) 1000 MG tablet Take 500 mg by mouth once daily.       aspirin (ECOTRIN) 81 MG EC tablet Take 81 mg by mouth once daily.      atorvastatin (LIPITOR) 40 MG tablet Take 40 mg by mouth once daily.      CALCIUM CARBONATE/VITAMIN D3 (CALCIUM WITH VITAMIN D ORAL) Take 1 tablet by mouth 2 (two) times daily.      cetirizine (ZYRTEC) 10 MG tablet Take 10 mg by mouth once daily.      DULoxetine (CYMBALTA) 30 MG capsule Take 2 capsules (60 mg total) by mouth once daily. 60 capsule 11    furosemide (LASIX) 20 MG tablet Take 20 mg by mouth 2 (two) times daily.      gabapentin (NEURONTIN) 600 MG tablet TAKE 1 TABLET (600 MG TOTAL) BY MOUTH 3 (THREE) TIMES DAILY. 90 tablet 5    IBUPROFEN ORAL Take by mouth.      ketoconazole (NIZORAL) 2 % cream Apply topically once daily.      meloxicam (MOBIC) 7.5 MG tablet Take 7.5 mg by mouth once daily.       metformin (GLUCOPHAGE) 500 MG tablet Take 250 mg by mouth daily with  "breakfast.       metoprolol tartrate (LOPRESSOR) 50 MG tablet Take 50 mg by mouth 2 (two) times daily.       multivitamin (ONE DAILY MULTIVITAMIN) per tablet Take 1 tablet by mouth once daily.      naltrexone-bupropion (CONTRAVE) 8-90 mg TbSR Take 1 tablet by mouth 2 (two) times daily.      pantoprazole (PROTONIX) 40 MG tablet Take 40 mg by mouth once daily.       spironolactone (ALDACTONE) 25 MG tablet Take 25 mg by mouth once daily.      UNKNOWN TO PATIENT Apply 1 application topically once daily.       No current facility-administered medications for this visit.        Past Medical History:   Diagnosis Date    Acid reflux     Anxiety     Edema     Hypertension     FER on CPAP     Other and unspecified hyperlipidemia     Prediabetes     Urinary frequency     Urinary incontinence        Past Surgical History:   Procedure Laterality Date    ADENOIDECTOMY      APPENDECTOMY      CARPAL TUNNEL RELEASE Bilateral     right 20 years ago; left 10/2015    CHOLECYSTECTOMY      COLONOSCOPY      HYSTERECTOMY      BRETT/BSO    OOPHORECTOMY      TONSILLECTOMY      ULNAR NERVE REPAIR      left side    ulner nerve      left side    UPPER GASTROINTESTINAL ENDOSCOPY         Review of Systems:  Constitutional: Negative for chills and fever.   Respiratory: Negative for cough and shortness of breath.    Gastrointestinal: Negative for nausea and vomiting.   Skin: Negative for rash.   Neurological: Negative for dizziness and headaches.   Psychiatric/Behavioral: Negative for depression.   MSK as in HPI       OBJECTIVE:     PHYSICAL EXAM:  /72   Pulse 64   Ht 5' 2" (1.575 m)   Wt 121.1 kg (267 lb)   LMP 11/20/1998 (Exact Date)   BMI 48.83 kg/m²     GEN:  NAD, well-developed, well-groomed.  NEURO: Awake, alert, and oriented. Normal attention and concentration.    PSYCH: Normal mood and affect. Behavior is normal.  HEENT: No cervical lymphadenopathy noted.  CARDIOVASCULAR: Radial pulses 2+ bilaterally. No " LE edema noted.  PULMONARY: Breath sounds normal. No respiratory distress.  SKIN: Intact, no rashes.      MSK:   RUE:  Good active ROM of the wrist and fingers. ttp over the ring A1 pulley, triggering noted. AIN/PIN/Radial/Median/Ulnar Nerves assessed in isolation without deficit. Radial & Ulnar arteries palpated 2+. Capillary Refill <3s.     LUE:  Good active ROM of the wrist and fingers. ttp over the ring A1 pulley, no triggering noted. AIN/PIN/Radial/Median/Ulnar Nerves assessed in isolation without deficit. Radial & Ulnar arteries palpated 2+. Capillary Refill <3s.       RADIOGRAPHS:  Xray bl hands 5/3/18  FINDINGS:  There is no evidence for acute fracture or dislocation of the hands bilaterally.  Radiocarpal and 1st carpometacarpal degenerative change bilaterally with joint space loss and articular sclerosis.  In addition there is similar degree of degenerative change within the 1st metacarpal phalangeal joint and diffusely in the interphalangeal joints bilaterally with mild joint space loss and articular sclerosis.  There is no focal bony erosion.    Comments: I have personally reviewed the imaging and I agree with the above radiologist's report.    ASSESSMENT/PLAN:       ICD-10-CM ICD-9-CM   1. Lesion of left ulnar nerve G56.22 354.2   2. Trigger ring finger of left hand M65.342 727.03   3. Trigger ring finger of right hand M65.341 727.03        Plan:   -begin OT, previously ordered  -monitor TF, too soon for repeat injections today   -RTC 4 wks        The patient indicates understanding of these issues and agrees to the plan.    Allie Ko PA-C  [facility]   [facility] [place]

## 2018-05-17 NOTE — PROGRESS NOTES
Subjective:      Patient ID: Brittany Park is a 58 y.o. female.    Chief Complaint: Pain of the Left Hand and Pain of the Right Hand      HPI  Brittany Park is a 58 y.o. female presenting today for bl finger pain and left hand numbness. She notes pain and locking of both ring fingers. This began about one month ago. It is painful. She also notes numbness in the ulnar distribution of the left hand for about 1 week. It is intermittent. Of note, she is s/p left ulnar nerve release by Dr. Broussard 5/3/17.          Review of patient's allergies indicates:   Allergen Reactions    Morphine Itching    Latex, natural rubber Rash         Current Outpatient Prescriptions   Medication Sig Dispense Refill    alprazolam (XANAX) 0.25 MG tablet Take 0.25 mg by mouth 2 (two) times daily as needed for Anxiety.      ascorbic acid, vitamin C, (VITAMIN C) 1000 MG tablet Take 500 mg by mouth once daily.       aspirin (ECOTRIN) 81 MG EC tablet Take 81 mg by mouth once daily.      atorvastatin (LIPITOR) 40 MG tablet Take 40 mg by mouth once daily.      CALCIUM CARBONATE/VITAMIN D3 (CALCIUM WITH VITAMIN D ORAL) Take 1 tablet by mouth 2 (two) times daily.      cetirizine (ZYRTEC) 10 MG tablet Take 10 mg by mouth once daily.      DULoxetine (CYMBALTA) 30 MG capsule Take 2 capsules (60 mg total) by mouth once daily. 60 capsule 11    furosemide (LASIX) 20 MG tablet Take 20 mg by mouth 2 (two) times daily.      gabapentin (NEURONTIN) 600 MG tablet TAKE 1 TABLET (600 MG TOTAL) BY MOUTH 3 (THREE) TIMES DAILY. 90 tablet 5    IBUPROFEN ORAL Take by mouth.      ketoconazole (NIZORAL) 2 % cream Apply topically once daily.      meloxicam (MOBIC) 7.5 MG tablet Take 7.5 mg by mouth once daily.       metformin (GLUCOPHAGE) 500 MG tablet Take 250 mg by mouth daily with breakfast.       metoprolol tartrate (LOPRESSOR) 50 MG tablet Take 50 mg by mouth 2 (two) times daily.       multivitamin (ONE DAILY MULTIVITAMIN) per tablet  "Take 1 tablet by mouth once daily.      naltrexone-bupropion (CONTRAVE) 8-90 mg TbSR Take 1 tablet by mouth 2 (two) times daily.      pantoprazole (PROTONIX) 40 MG tablet Take 40 mg by mouth once daily.       spironolactone (ALDACTONE) 25 MG tablet Take 25 mg by mouth once daily.      UNKNOWN TO PATIENT Apply 1 application topically once daily.       No current facility-administered medications for this visit.        Past Medical History:   Diagnosis Date    Acid reflux     Anxiety     Edema     Hypertension     FER on CPAP     Other and unspecified hyperlipidemia     Prediabetes     Urinary frequency     Urinary incontinence        Past Surgical History:   Procedure Laterality Date    ADENOIDECTOMY      APPENDECTOMY      CARPAL TUNNEL RELEASE Bilateral     right 20 years ago; left 10/2015    CHOLECYSTECTOMY      COLONOSCOPY      HYSTERECTOMY      BRETT/BSO    OOPHORECTOMY      TONSILLECTOMY      ULNAR NERVE REPAIR      left side    ulner nerve      left side    UPPER GASTROINTESTINAL ENDOSCOPY         Review of Systems:  Constitutional: Negative for chills and fever.   Respiratory: Negative for cough and shortness of breath.    Gastrointestinal: Negative for nausea and vomiting.   Skin: Negative for rash.   Neurological: Negative for dizziness and headaches.   Psychiatric/Behavioral: Negative for depression.   MSK as in HPI       OBJECTIVE:     PHYSICAL EXAM:  /81 (BP Location: Left arm, Patient Position: Sitting, BP Method: Large (Automatic))   Pulse 64   Ht 5' 2" (1.575 m)   Wt 121.1 kg (267 lb)   LMP 11/20/1998 (Exact Date)   BMI 48.83 kg/m²     GEN:  NAD, well-developed, well-groomed.  NEURO: Awake, alert, and oriented. Normal attention and concentration.    PSYCH: Normal mood and affect. Behavior is normal.  HEENT: No cervical lymphadenopathy noted.  CARDIOVASCULAR: Radial pulses 2+ bilaterally. No LE edema noted.  PULMONARY: Breath sounds normal. No respiratory " distress.  SKIN: Intact, no rashes.      MSK:   RUE:  Good active ROM of the wrist and fingers. ttp over the ring A1 pulley, no triggering noted. AIN/PIN/Radial/Median/Ulnar Nerves assessed in isolation without deficit. Radial & Ulnar arteries palpated 2+. Capillary Refill <3s.     LUE:  Good active ROM of the wrist and fingers. ttp over the ring A1 pulley, no triggering noted. AIN/PIN/Radial/Median/Ulnar Nerves assessed in isolation without deficit. Radial & Ulnar arteries palpated 2+. Capillary Refill <3s. Positive tinels at the elbow.    RADIOGRAPHS:  Xray bl hands 5/3/18  FINDINGS:  There is no evidence for acute fracture or dislocation of the hands bilaterally.  Radiocarpal and 1st carpometacarpal degenerative change bilaterally with joint space loss and articular sclerosis.  In addition there is similar degree of degenerative change within the 1st metacarpal phalangeal joint and diffusely in the interphalangeal joints bilaterally with mild joint space loss and articular sclerosis.  There is no focal bony erosion.    Comments: I have personally reviewed the imaging and I agree with the above radiologist's report.    ASSESSMENT/PLAN:       ICD-10-CM ICD-9-CM   1. Lesion of left ulnar nerve G56.22 354.2   2. Trigger finger of left hand, unspecified finger M65.30 727.03   3. Trigger finger of right hand, unspecified finger M65.30 727.03       Orders Placed This Encounter    Tendon Sheath    Tendon Sheath    Ambulatory Referral to Physical/Occupational Therapy        Plan:   -bl ring trigger finger injections today   -referral to OT placed  -RTC 6 wks         PROCEDURE:  I have explained the risks, benefits, and alternatives of the procedure in detail.  The patient voices understanding and all questions have been answered.  The patient agrees to proceed as planned. So after a sterile prep of the skin in the normal fashion the bilateral ring tendon sheath is injected from the volar approach using a 25 gauge  needle with a combination of 0.5cc 1% plain lidocaine and 4 mg of dexamethasone.  The patient is cautioned and immediate relief of pain is secondary to the local anesthetic and will be temporary.  After the anesthetic wears off there may be a increase in pain that may last for a few hours or a few days and they should use ice to help alleviate this flair up of pain. Patient tolerated the procedure well.         The patient indicates understanding of these issues and agrees to the plan.    Allie Ko PA-C  [facility] [place], [place]

## 2018-05-17 NOTE — PROGRESS NOTES
I have personally taken the history and examined the patient. I agree with the Hand Surgery PA's note. The plan will be OT. Pt states the injections did make her trigger fingers better but her right ring finger did lock today. Pt wants to watch them and not do surgery.  Pt has not started OT and will start this first.

## 2018-05-21 RX ORDER — DEXAMETHASONE SODIUM PHOSPHATE 4 MG/ML
4 INJECTION, SOLUTION INTRA-ARTICULAR; INTRALESIONAL; INTRAMUSCULAR; INTRAVENOUS; SOFT TISSUE
Status: DISCONTINUED | OUTPATIENT
Start: 2018-05-03 | End: 2018-05-21 | Stop reason: HOSPADM

## 2018-05-21 NOTE — PROGRESS NOTES
I have personally taken the history and examined the patient. I agree with the Hand Surgery PA's note. The plan will be trigger finger injections today and OT ordered.

## 2018-06-12 ENCOUNTER — OFFICE VISIT (OUTPATIENT)
Dept: ORTHOPEDICS | Facility: CLINIC | Age: 58
End: 2018-06-12
Payer: COMMERCIAL

## 2018-06-12 VITALS
HEIGHT: 62 IN | DIASTOLIC BLOOD PRESSURE: 67 MMHG | BODY MASS INDEX: 49.13 KG/M2 | HEART RATE: 64 BPM | WEIGHT: 267 LBS | SYSTOLIC BLOOD PRESSURE: 119 MMHG

## 2018-06-12 DIAGNOSIS — M65.30 TRIGGER FINGER OF RIGHT HAND, UNSPECIFIED FINGER: Primary | ICD-10-CM

## 2018-06-12 PROCEDURE — 99999 PR PBB SHADOW E&M-EST. PATIENT-LVL IV: CPT | Mod: PBBFAC,,, | Performed by: ORTHOPAEDIC SURGERY

## 2018-06-12 PROCEDURE — 3008F BODY MASS INDEX DOCD: CPT | Mod: CPTII,S$GLB,, | Performed by: ORTHOPAEDIC SURGERY

## 2018-06-12 PROCEDURE — 3078F DIAST BP <80 MM HG: CPT | Mod: CPTII,S$GLB,, | Performed by: ORTHOPAEDIC SURGERY

## 2018-06-12 PROCEDURE — 3074F SYST BP LT 130 MM HG: CPT | Mod: CPTII,S$GLB,, | Performed by: ORTHOPAEDIC SURGERY

## 2018-06-12 PROCEDURE — 99214 OFFICE O/P EST MOD 30 MIN: CPT | Mod: S$GLB,,, | Performed by: ORTHOPAEDIC SURGERY

## 2018-06-12 NOTE — PROGRESS NOTES
I have personally taken the history and examined this patient. I agree with the resident's note as stated above. Plan for surgical release of ring finger trigger finger.  I have explained the risks, benefits, and alternatives of the procedure to the patient in great detail. The patient voices understanding and all questions have been answered. The patient agrees with to proceed as planned. Consents were performed in clinic. Plan for right then left. Out of work for 6 weeks.

## 2018-06-12 NOTE — PROGRESS NOTES
Subjective:      Patient ID: Brittany Park is a 58 y.o. female.    Chief Complaint: Pain of the Right Hand and Pain of the Left Hand      HPI  Brittany Park is a 58 y.o. female presenting today for followup of bilateral ring finger trigger finger She notes pain and locking of both ring fingers. Last trigger finger injections were 5/3/18. Since the injections her triggering has greatly improved however she still has constant pain and tenderness. She has not started OT.    Of note, she is s/p left ulnar nerve release by Dr. Broussard 5/3/17.     Review of patient's allergies indicates:   Allergen Reactions    Morphine Itching    Latex, natural rubber Rash         Current Outpatient Prescriptions   Medication Sig Dispense Refill    alprazolam (XANAX) 0.25 MG tablet Take 0.25 mg by mouth 2 (two) times daily as needed for Anxiety.      ascorbic acid, vitamin C, (VITAMIN C) 1000 MG tablet Take 500 mg by mouth once daily.       aspirin (ECOTRIN) 81 MG EC tablet Take 81 mg by mouth once daily.      atorvastatin (LIPITOR) 40 MG tablet Take 40 mg by mouth once daily.      CALCIUM CARBONATE/VITAMIN D3 (CALCIUM WITH VITAMIN D ORAL) Take 1 tablet by mouth 2 (two) times daily.      cetirizine (ZYRTEC) 10 MG tablet Take 10 mg by mouth once daily.      DULoxetine (CYMBALTA) 30 MG capsule Take 2 capsules (60 mg total) by mouth once daily. 60 capsule 11    furosemide (LASIX) 20 MG tablet Take 20 mg by mouth 2 (two) times daily.      gabapentin (NEURONTIN) 600 MG tablet TAKE 1 TABLET (600 MG TOTAL) BY MOUTH 3 (THREE) TIMES DAILY. 90 tablet 5    IBUPROFEN ORAL Take by mouth.      ketoconazole (NIZORAL) 2 % cream Apply topically once daily.      meloxicam (MOBIC) 7.5 MG tablet Take 7.5 mg by mouth once daily.       metformin (GLUCOPHAGE) 500 MG tablet Take 250 mg by mouth daily with breakfast.       metoprolol tartrate (LOPRESSOR) 50 MG tablet Take 50 mg by mouth 2 (two) times daily.       multivitamin (ONE  "DAILY MULTIVITAMIN) per tablet Take 1 tablet by mouth once daily.      naltrexone-bupropion (CONTRAVE) 8-90 mg TbSR Take 1 tablet by mouth 2 (two) times daily.      pantoprazole (PROTONIX) 40 MG tablet Take 40 mg by mouth once daily.       spironolactone (ALDACTONE) 25 MG tablet Take 25 mg by mouth once daily.      UNKNOWN TO PATIENT Apply 1 application topically once daily.       No current facility-administered medications for this visit.        Past Medical History:   Diagnosis Date    Acid reflux     Anxiety     Edema     Hypertension     FER on CPAP     Other and unspecified hyperlipidemia     Prediabetes     Urinary frequency     Urinary incontinence        Past Surgical History:   Procedure Laterality Date    ADENOIDECTOMY      APPENDECTOMY      CARPAL TUNNEL RELEASE Bilateral     right 20 years ago; left 10/2015    CHOLECYSTECTOMY      COLONOSCOPY      HYSTERECTOMY      BRETT/BSO    OOPHORECTOMY      TONSILLECTOMY      ULNAR NERVE REPAIR      left side    ulner nerve      left side    UPPER GASTROINTESTINAL ENDOSCOPY         Review of Systems:  Constitutional: Negative for chills and fever.   Respiratory: Negative for cough and shortness of breath.    Gastrointestinal: Negative for nausea and vomiting.   Skin: Negative for rash.   Neurological: Negative for dizziness and headaches.   Psychiatric/Behavioral: Negative for depression.   MSK as in HPI       OBJECTIVE:     PHYSICAL EXAM:  /67   Pulse 64   Ht 5' 2" (1.575 m)   Wt 121.1 kg (267 lb)   LMP 11/20/1998 (Exact Date)   BMI 48.83 kg/m²     GEN:  NAD, well-developed, well-groomed.  NEURO: Awake, alert, and oriented. Normal attention and concentration.    PSYCH: Normal mood and affect. Behavior is normal.  HEENT: No cervical lymphadenopathy noted.  CARDIOVASCULAR: Radial pulses 2+ bilaterally. No LE edema noted.  PULMONARY: Breath sounds normal. No respiratory distress.  SKIN: Intact, no rashes.      MSK:   RUE:  Good active " ROM of the wrist and fingers. ttp over the ring A1 pulley. Full flexion and extension without triggering. AIN/PIN/Radial/Median/Ulnar Nerves assessed in isolation without deficit. Radial & Ulnar arteries palpated 2+. Capillary Refill <3s.     LUE:  Good active ROM of the wrist and fingers. ttp over the ring A1 pulley. Full flexion and extension without triggering. AIN/PIN/Radial/Median/Ulnar Nerves assessed in isolation without deficit. Radial & Ulnar arteries palpated 2+. Capillary Refill <3s.       RADIOGRAPHS:  Xray bl hands 5/3/18  FINDINGS:  There is no evidence for acute fracture or dislocation of the hands bilaterally.  Radiocarpal and 1st carpometacarpal degenerative change bilaterally with joint space loss and articular sclerosis.  In addition there is similar degree of degenerative change within the 1st metacarpal phalangeal joint and diffusely in the interphalangeal joints bilaterally with mild joint space loss and articular sclerosis.  There is no focal bony erosion.    Comments: I have personally reviewed the imaging and I agree with the above radiologist's report.    ASSESSMENT/PLAN:     No diagnosis found.     Plan:   -begin OT, previously ordered  -monitor TF, too soon for repeat injections today   -RTC 4 wks

## 2018-06-13 DIAGNOSIS — M65.341 TRIGGER FINGER, RIGHT RING FINGER: Primary | ICD-10-CM

## 2018-06-21 RX ORDER — ACETAMINOPHEN AND CODEINE PHOSPHATE 300; 30 MG/1; MG/1
1 TABLET ORAL EVERY 4 HOURS PRN
Qty: 33 TABLET | Refills: 0 | Status: SHIPPED | OUTPATIENT
Start: 2018-06-21 | End: 2018-07-09

## 2018-06-21 NOTE — PRE ADMISSION SCREENING
Phone interview conducted.  Patient states unable to come in for PreAdmit appt prior to surgery.  Preop instructions reviewed with verbal understanding noted.

## 2018-06-22 ENCOUNTER — TELEPHONE (OUTPATIENT)
Dept: ORTHOPEDICS | Facility: CLINIC | Age: 58
End: 2018-06-22

## 2018-06-22 NOTE — TELEPHONE ENCOUNTER
Brittany Park notified of arrival time 0530 for surgery on 6/25/18 with Dr. VALENTINA Broussard at [facility]. Post Op appointment made, slip in mail.

## 2018-06-22 NOTE — TELEPHONE ENCOUNTER
Brittany Park notified of arrival time 0500 for surgery on 6/25/18 with Dr. VALENTINA Broussard at [facility]. Post Op appointment made, slip in mail.  Pt verbalized understanding to new arrival time.

## 2018-06-22 NOTE — TELEPHONE ENCOUNTER
----- Message from Trish Russell RN sent at 6/22/2018 12:26 PM CDT -----  Regarding: RE: patient unable to come in  5 am would be better since she needs the lab  ----- Message -----  From: Florecita Sandoval LPN  Sent: 6/22/2018  11:14 AM  To: Trish Russell RN  Subject: RE: patient unable to come in                    Rony wants her first case, is 5:30 early enough?    ----- Message -----  From: Trish Russell RN  Sent: 6/21/2018   3:21 PM  To: Trish Russell RN, #  Subject: patient unable to come in                        Patient called us in PreAdmit.  States she is unable to come in tomorrow for surgery on Monday.  I did a phone interview.  When you give her an arrival time, please make sure there is time to draw her labs.  Thanks,  Trish  ----- Message -----  From: Trish Russell RN  Sent: 6/21/2018   9:06 AM  To: Trish Russell RN, #  Subject: unable to reach patient                          Surgery 6/25.  Unable to reach patient.  Several messages left.  If possible, we would like to have her come in for a PreAdmit appt.  She will need labs am of surgery and we see she is a first case.    If you are able to reach patient, please have her give us a call.    Trish Russell RN  PreAdmit  [phone]

## 2018-06-25 ENCOUNTER — HOSPITAL ENCOUNTER (OUTPATIENT)
Facility: OTHER | Age: 58
Discharge: HOME | End: 2018-06-25
Attending: ORTHOPAEDIC SURGERY | Admitting: ORTHOPAEDIC SURGERY
Payer: COMMERCIAL

## 2018-06-25 ENCOUNTER — SURGERY (OUTPATIENT)
Age: 58
End: 2018-06-25

## 2018-06-25 ENCOUNTER — ANESTHESIA (OUTPATIENT)
Dept: SURGERY | Facility: OTHER | Age: 58
End: 2018-06-25
Payer: COMMERCIAL

## 2018-06-25 ENCOUNTER — ANESTHESIA EVENT (OUTPATIENT)
Dept: SURGERY | Facility: OTHER | Age: 58
End: 2018-06-25
Payer: COMMERCIAL

## 2018-06-25 VITALS
HEIGHT: 63 IN | RESPIRATION RATE: 16 BRPM | OXYGEN SATURATION: 96 % | WEIGHT: 268 LBS | SYSTOLIC BLOOD PRESSURE: 138 MMHG | DIASTOLIC BLOOD PRESSURE: 68 MMHG | TEMPERATURE: 98 F | HEART RATE: 58 BPM | BODY MASS INDEX: 47.48 KG/M2

## 2018-06-25 DIAGNOSIS — M65.30 TRIGGER FINGER: ICD-10-CM

## 2018-06-25 DIAGNOSIS — M65.341 TRIGGER FINGER, RIGHT RING FINGER: Primary | ICD-10-CM

## 2018-06-25 DIAGNOSIS — Z01.818 PREOP TESTING: ICD-10-CM

## 2018-06-25 LAB
ANION GAP SERPL CALC-SCNC: 13 MMOL/L
BASOPHILS # BLD AUTO: 0.03 K/UL
BASOPHILS NFR BLD: 0.3 %
BUN SERPL-MCNC: 13 MG/DL
CALCIUM SERPL-MCNC: 9.3 MG/DL
CHLORIDE SERPL-SCNC: 101 MMOL/L
CO2 SERPL-SCNC: 28 MMOL/L
CREAT SERPL-MCNC: 0.7 MG/DL
DIFFERENTIAL METHOD: NORMAL
EOSINOPHIL # BLD AUTO: 0.3 K/UL
EOSINOPHIL NFR BLD: 2.5 %
ERYTHROCYTE [DISTWIDTH] IN BLOOD BY AUTOMATED COUNT: 13.2 %
EST. GFR  (AFRICAN AMERICAN): >60 ML/MIN/1.73 M^2
EST. GFR  (NON AFRICAN AMERICAN): >60 ML/MIN/1.73 M^2
GLUCOSE SERPL-MCNC: 112 MG/DL
HCT VFR BLD AUTO: 42.6 %
HGB BLD-MCNC: 14.3 G/DL
LYMPHOCYTES # BLD AUTO: 3.1 K/UL
LYMPHOCYTES NFR BLD: 29.9 %
MCH RBC QN AUTO: 30.8 PG
MCHC RBC AUTO-ENTMCNC: 33.6 G/DL
MCV RBC AUTO: 92 FL
MONOCYTES # BLD AUTO: 0.8 K/UL
MONOCYTES NFR BLD: 8.2 %
NEUTROPHILS # BLD AUTO: 6 K/UL
NEUTROPHILS NFR BLD: 58.8 %
PLATELET # BLD AUTO: 206 K/UL
PMV BLD AUTO: 12 FL
POCT GLUCOSE: 105 MG/DL (ref 70–110)
POTASSIUM SERPL-SCNC: 3.7 MMOL/L
RBC # BLD AUTO: 4.65 M/UL
SODIUM SERPL-SCNC: 142 MMOL/L
WBC # BLD AUTO: 10.24 K/UL

## 2018-06-25 PROCEDURE — 25000003 PHARM REV CODE 250: Performed by: ORTHOPAEDIC SURGERY

## 2018-06-25 PROCEDURE — 37000008 HC ANESTHESIA 1ST 15 MINUTES: Performed by: ORTHOPAEDIC SURGERY

## 2018-06-25 PROCEDURE — 80048 BASIC METABOLIC PNL TOTAL CA: CPT

## 2018-06-25 PROCEDURE — 63600175 PHARM REV CODE 636 W HCPCS: Performed by: NURSE ANESTHETIST, CERTIFIED REGISTERED

## 2018-06-25 PROCEDURE — 71000016 HC POSTOP RECOV ADDL HR: Performed by: ORTHOPAEDIC SURGERY

## 2018-06-25 PROCEDURE — 36000707: Performed by: ORTHOPAEDIC SURGERY

## 2018-06-25 PROCEDURE — 25000003 PHARM REV CODE 250: Performed by: NURSE ANESTHETIST, CERTIFIED REGISTERED

## 2018-06-25 PROCEDURE — 36415 COLL VENOUS BLD VENIPUNCTURE: CPT

## 2018-06-25 PROCEDURE — 37000009 HC ANESTHESIA EA ADD 15 MINS: Performed by: ORTHOPAEDIC SURGERY

## 2018-06-25 PROCEDURE — 36000706: Performed by: ORTHOPAEDIC SURGERY

## 2018-06-25 PROCEDURE — 71000015 HC POSTOP RECOV 1ST HR: Performed by: ORTHOPAEDIC SURGERY

## 2018-06-25 PROCEDURE — 85025 COMPLETE CBC W/AUTO DIFF WBC: CPT

## 2018-06-25 PROCEDURE — 63600175 PHARM REV CODE 636 W HCPCS: Performed by: STUDENT IN AN ORGANIZED HEALTH CARE EDUCATION/TRAINING PROGRAM

## 2018-06-25 PROCEDURE — 25000003 PHARM REV CODE 250: Performed by: STUDENT IN AN ORGANIZED HEALTH CARE EDUCATION/TRAINING PROGRAM

## 2018-06-25 PROCEDURE — 26055 INCISE FINGER TENDON SHEATH: CPT | Mod: F8,,, | Performed by: ORTHOPAEDIC SURGERY

## 2018-06-25 RX ORDER — SODIUM CHLORIDE 0.9 % (FLUSH) 0.9 %
5 SYRINGE (ML) INJECTION
Status: DISCONTINUED | OUTPATIENT
Start: 2018-06-25 | End: 2018-06-25 | Stop reason: HOSPADM

## 2018-06-25 RX ORDER — MUPIROCIN 20 MG/G
1 OINTMENT TOPICAL 2 TIMES DAILY
Status: DISCONTINUED | OUTPATIENT
Start: 2018-06-25 | End: 2018-06-25 | Stop reason: HOSPADM

## 2018-06-25 RX ORDER — MEPERIDINE HYDROCHLORIDE 50 MG/ML
12.5 INJECTION INTRAMUSCULAR; INTRAVENOUS; SUBCUTANEOUS ONCE AS NEEDED
Status: DISCONTINUED | OUTPATIENT
Start: 2018-06-25 | End: 2018-06-25 | Stop reason: HOSPADM

## 2018-06-25 RX ORDER — SODIUM CHLORIDE, SODIUM LACTATE, POTASSIUM CHLORIDE, CALCIUM CHLORIDE 600; 310; 30; 20 MG/100ML; MG/100ML; MG/100ML; MG/100ML
INJECTION, SOLUTION INTRAVENOUS CONTINUOUS PRN
Status: DISCONTINUED | OUTPATIENT
Start: 2018-06-25 | End: 2018-06-25

## 2018-06-25 RX ORDER — FENTANYL CITRATE 50 UG/ML
INJECTION, SOLUTION INTRAMUSCULAR; INTRAVENOUS
Status: DISCONTINUED | OUTPATIENT
Start: 2018-06-25 | End: 2018-06-25

## 2018-06-25 RX ORDER — OXYCODONE HYDROCHLORIDE 5 MG/1
5 TABLET ORAL
Status: DISCONTINUED | OUTPATIENT
Start: 2018-06-25 | End: 2018-06-25 | Stop reason: HOSPADM

## 2018-06-25 RX ORDER — LIDOCAINE HCL/PF 100 MG/5ML
SYRINGE (ML) INTRAVENOUS
Status: DISCONTINUED | OUTPATIENT
Start: 2018-06-25 | End: 2018-06-25

## 2018-06-25 RX ORDER — PROPOFOL 10 MG/ML
VIAL (ML) INTRAVENOUS CONTINUOUS PRN
Status: DISCONTINUED | OUTPATIENT
Start: 2018-06-25 | End: 2018-06-25

## 2018-06-25 RX ORDER — BUPIVACAINE HYDROCHLORIDE 2.5 MG/ML
INJECTION, SOLUTION EPIDURAL; INFILTRATION; INTRACAUDAL
Status: DISCONTINUED | OUTPATIENT
Start: 2018-06-25 | End: 2018-06-25 | Stop reason: HOSPADM

## 2018-06-25 RX ORDER — LIDOCAINE HYDROCHLORIDE 10 MG/ML
INJECTION, SOLUTION EPIDURAL; INFILTRATION; INTRACAUDAL; PERINEURAL
Status: DISCONTINUED | OUTPATIENT
Start: 2018-06-25 | End: 2018-06-25 | Stop reason: HOSPADM

## 2018-06-25 RX ORDER — CEFAZOLIN SODIUM 2 G/50ML
2 SOLUTION INTRAVENOUS
Status: COMPLETED | OUTPATIENT
Start: 2018-06-25 | End: 2018-06-25

## 2018-06-25 RX ORDER — PROPOFOL 10 MG/ML
VIAL (ML) INTRAVENOUS
Status: DISCONTINUED | OUTPATIENT
Start: 2018-06-25 | End: 2018-06-25

## 2018-06-25 RX ORDER — MUPIROCIN 20 MG/G
OINTMENT TOPICAL
Status: DISCONTINUED | OUTPATIENT
Start: 2018-06-25 | End: 2018-06-25 | Stop reason: HOSPADM

## 2018-06-25 RX ORDER — ONDANSETRON 2 MG/ML
4 INJECTION INTRAMUSCULAR; INTRAVENOUS DAILY PRN
Status: DISCONTINUED | OUTPATIENT
Start: 2018-06-25 | End: 2018-06-25 | Stop reason: HOSPADM

## 2018-06-25 RX ORDER — FENTANYL CITRATE 50 UG/ML
25 INJECTION, SOLUTION INTRAMUSCULAR; INTRAVENOUS EVERY 5 MIN PRN
Status: DISCONTINUED | OUTPATIENT
Start: 2018-06-25 | End: 2018-06-25 | Stop reason: HOSPADM

## 2018-06-25 RX ORDER — ACETAMINOPHEN 10 MG/ML
INJECTION, SOLUTION INTRAVENOUS
Status: DISCONTINUED | OUTPATIENT
Start: 2018-06-25 | End: 2018-06-25

## 2018-06-25 RX ORDER — HYDROCODONE BITARTRATE AND ACETAMINOPHEN 5; 325 MG/1; MG/1
1 TABLET ORAL EVERY 4 HOURS PRN
Status: DISCONTINUED | OUTPATIENT
Start: 2018-06-25 | End: 2018-06-25 | Stop reason: HOSPADM

## 2018-06-25 RX ORDER — SODIUM CHLORIDE 0.9 % (FLUSH) 0.9 %
3 SYRINGE (ML) INJECTION
Status: DISCONTINUED | OUTPATIENT
Start: 2018-06-25 | End: 2018-06-25 | Stop reason: HOSPADM

## 2018-06-25 RX ADMIN — PROPOFOL 30 MG: 10 INJECTION, EMULSION INTRAVENOUS at 06:06

## 2018-06-25 RX ADMIN — PROPOFOL 50 MCG/KG/MIN: 10 INJECTION, EMULSION INTRAVENOUS at 06:06

## 2018-06-25 RX ADMIN — SODIUM CHLORIDE, SODIUM LACTATE, POTASSIUM CHLORIDE, AND CALCIUM CHLORIDE: 600; 310; 30; 20 INJECTION, SOLUTION INTRAVENOUS at 06:06

## 2018-06-25 RX ADMIN — CEFAZOLIN SODIUM 2 G: 2 SOLUTION INTRAVENOUS at 06:06

## 2018-06-25 RX ADMIN — ACETAMINOPHEN 1000 MG: 10 INJECTION, SOLUTION INTRAVENOUS at 06:06

## 2018-06-25 RX ADMIN — FENTANYL CITRATE 50 MCG: 50 INJECTION, SOLUTION INTRAMUSCULAR; INTRAVENOUS at 06:06

## 2018-06-25 RX ADMIN — BUPIVACAINE HYDROCHLORIDE 10 ML: 2.5 INJECTION, SOLUTION EPIDURAL; INFILTRATION; INTRACAUDAL; PERINEURAL at 07:06

## 2018-06-25 RX ADMIN — LIDOCAINE HYDROCHLORIDE 10 ML: 10 INJECTION, SOLUTION EPIDURAL; INFILTRATION; INTRACAUDAL; PERINEURAL at 07:06

## 2018-06-25 RX ADMIN — LIDOCAINE HYDROCHLORIDE 50 MG: 20 INJECTION, SOLUTION INTRAVENOUS at 06:06

## 2018-06-25 RX ADMIN — PROPOFOL 30 MG: 10 INJECTION, EMULSION INTRAVENOUS at 07:06

## 2018-06-25 RX ADMIN — FENTANYL CITRATE 50 MCG: 50 INJECTION, SOLUTION INTRAMUSCULAR; INTRAVENOUS at 07:06

## 2018-06-25 RX ADMIN — MUPIROCIN: 20 OINTMENT TOPICAL at 06:06

## 2018-06-25 NOTE — ANESTHESIA POSTPROCEDURE EVALUATION
"Anesthesia Post Evaluation    Patient: Brittany Park    Procedure(s) Performed: Procedure(s) (LRB):  RELEASE, TRIGGER FINGER - RIGHT RING FINGER (Right)    Final Anesthesia Type: general  Patient location during evaluation: [facility]  Patient participation: Yes- Able to Participate  Level of consciousness: awake and alert  Post-procedure vital signs: reviewed and stable  Pain management: adequate  Airway patency: patent  PONV status at discharge: No PONV  Anesthetic complications: no      Cardiovascular status: blood pressure returned to baseline  Respiratory status: unassisted  Hydration status: euvolemic  Follow-up not needed.        Visit Vitals  /67 (BP Location: Left arm, Patient Position: Lying)   Pulse 61   Temp 37 °C (98.6 °F) (Oral)   Resp 16   Ht 5' 2.5" (1.588 m)   Wt 121.6 kg (268 lb)   LMP 11/20/1998 (Exact Date)   SpO2 99%   Breastfeeding? No   BMI 48.24 kg/m²       Pain/Gerardo Score: Presence of Pain: denies (6/25/2018  6:15 AM)      "

## 2018-06-25 NOTE — DISCHARGE SUMMARY
[facility] Short Stay  Discharge Summary    Admit Date: 6/25/2018    Discharge Date and Time: No discharge date for patient encounter.      Discharge Attending Physician: Violette Broussard MD     Hospital Course (synopsis of major diagnoses, care, treatment, and services provided during the course of the hospital stay): surgery    Final Diagnoses:    Principal Problem: <principal problem not specified>   Secondary Diagnoses:   Active Hospital Problems    Diagnosis  POA    Trigger finger [M65.30]  Yes      Resolved Hospital Problems    Diagnosis Date Resolved POA   No resolved problems to display.       Discharged Condition: good    Disposition: Home or Self Care    Follow up/Patient Instructions:    Medications:  Reconciled Home Medications:      Medication List      CONTINUE taking these medications    acetaminophen-codeine 300-30mg 300-30 mg Tab  Commonly known as:  TYLENOL #3  Take 1 tablet by mouth every 4 (four) hours as needed.     ALPRAZolam 0.25 MG tablet  Commonly known as:  XANAX  Take 0.25 mg by mouth 2 (two) times daily as needed for Anxiety.     aspirin 81 MG EC tablet  Commonly known as:  ECOTRIN  Take 81 mg by mouth once daily.     atorvastatin 40 MG tablet  Commonly known as:  LIPITOR  Take 40 mg by mouth once daily.     CALCIUM WITH VITAMIN D ORAL  Take 1 tablet by mouth 2 (two) times daily.     cetirizine 10 MG tablet  Commonly known as:  ZYRTEC  Take 10 mg by mouth once daily.     CONTRAVE 8-90 mg Tbsr  Generic drug:  naltrexone-bupropion  Take 1 tablet by mouth 2 (two) times daily.     DULoxetine 30 MG capsule  Commonly known as:  CYMBALTA  Take 2 capsules (60 mg total) by mouth once daily.     furosemide 20 MG tablet  Commonly known as:  LASIX  Take 20 mg by mouth 2 (two) times daily.     gabapentin 600 MG tablet  Commonly known as:  NEURONTIN  TAKE 1 TABLET (600 MG TOTAL) BY MOUTH 3 (THREE) TIMES DAILY.     IBUPROFEN ORAL  Take by mouth.     ketoconazole 2 %  cream  Commonly known as:  NIZORAL  Apply topically once daily.     meloxicam 7.5 MG tablet  Commonly known as:  MOBIC  Take 7.5 mg by mouth once daily.     metFORMIN 500 MG tablet  Commonly known as:  GLUCOPHAGE  Take 250 mg by mouth daily with breakfast.     metoprolol tartrate 50 MG tablet  Commonly known as:  LOPRESSOR  Take 50 mg by mouth 2 (two) times daily.     ONE DAILY MULTIVITAMIN per tablet  Generic drug:  multivitamin  Take 1 tablet by mouth once daily.     pantoprazole 40 MG tablet  Commonly known as:  PROTONIX  Take 40 mg by mouth once daily.     spironolactone 25 MG tablet  Commonly known as:  ALDACTONE  Take 25 mg by mouth once daily.     UNKNOWN TO PATIENT  Apply 1 application topically once daily.     VITAMIN C 1000 MG tablet  Generic drug:  ascorbic acid (vitamin C)  Take 500 mg by mouth once daily.            Discharge Procedure Orders  Diet general     Call MD for:  temperature >100.4     Call MD for:  persistent nausea and vomiting     Call MD for:  severe uncontrolled pain     Call MD for:  difficulty breathing, headache or visual disturbances     Call MD for:  redness, tenderness, or signs of infection (pain, swelling, redness, odor or green/yellow discharge around incision site)     Call MD for:  hives     Call MD for:  persistent dizziness or light-headedness     Call MD for:  extreme fatigue     No driving, operating heavy equipment or signing legal documents while taking pain medication.     Keep surgical extremity elevated     Leave dressing on - Keep it clean, dry, and intact until clinic visit

## 2018-06-25 NOTE — BRIEF OP NOTE
Preoperative Diagnosis:right ring trigger finger  Postoperative Diagnosis:same  Procedure:right ring trigger finger release  Blood loss:none  Implants:none  Specimen:none  Complications:none  Surgeon:Sy Mccracken MD  Assistant Surgeon:

## 2018-06-25 NOTE — OP NOTE
DATE OF PROCEDURE: 6/25/18  SERVICE: Orthopedics.   ATTENDING SURGEON: Violette Broussard M.D.   ASSISTANT SURGEON: none   PREOPERATIVE DIAGNOSIS:right ringtrigger finger.   POSTOPERATIVE DIAGNOSIS: l  right ringfinger trigger finger.   PROCEDURE: right ring finger A1 pulley release.   ANESTHESIA: Local placed by surgeon and MAC.   FLUID: Lactated Ringer's.   BLOOD LOSS: None, no blood was given.   TOURNIQUET TIME: 10 minutes.   PACKS AND DRAINS: None.   IMPLANTS: None.   SPECIMENS: None.   COMPLICATIONS: None.   INDICATIONS FOR PROCEDURE: Brittany Park is a 58 y.o.year-old who has failed   conservative treatment for  right  ringfinger trigger finger; therefore,   operative intervention was deemed necessary. Risks and benefits were explained   to the patient in clinic. Consents were performed in clinic.   PROCEDURE IN DETAIL: After the correct site was marked with the patient's   participation in the Holding Area, the patient was brought to the Operating   Room, placed in supine position, underwent MAC anesthesia. A well-padded   nonsterile tourniquet was placed on the right forearm. Time-out was called for   correct site, procedure and patient to be indicated. Under sterile conditions,   an injection of lidocaine 1% without epinephrine was injected at the A1 pulley   space. The arm was prepped and draped in normal sterile fashion. Incision was   marked out in a longitudinal fashion along the pulley. The arm was   exsanguinated using Esmarch. Tourniquet was insufflated 250 mmHg and that is   where it remained for 10 minutes. The incision was made. Careful dissection   down was maintained to the A1 pulley. The neurovascular structures were   retracted out of the way. The A1 pulley was identified. It was sharply   incised. It was completely incised proximally and distally. Portion of    pulley was also incised. The tendon was then retracted out of the tendon sheath   using a right angle. The finger was placed  through range of motion, showed it   did not trigger. The area was irrigated with copious amounts of normal saline.   Nylon closed the skin. Sterile dressing was applied. Tourniquet was deflated.   Brisk cap refill ensued. The patient was transferred the Recovery Room in   stable condition.   POSTOPERATIVE PLAN FOR THIS PATIENT: The patient is to keep the dressing clean, dry and   intact. We will take the sutures out at two weeks.

## 2018-06-25 NOTE — TRANSFER OF CARE
"Anesthesia Transfer of Care Note    Patient: Brittany Park    Procedure(s) Performed: Procedure(s) (LRB):  RELEASE, TRIGGER FINGER - RIGHT RING FINGER (Right)    Patient location: [facility]    Anesthesia Type: general    Transport from OR: Transported from OR on room air with adequate spontaneous ventilation    Post pain: adequate analgesia    Post assessment: no apparent anesthetic complications    Post vital signs: stable    Level of consciousness: awake    Nausea/Vomiting: no nausea/vomiting    Complications: none    Transfer of care protocol was followed      Last vitals:   Visit Vitals  /67 (BP Location: Left arm, Patient Position: Lying)   Pulse 61   Temp 37 °C (98.6 °F) (Oral)   Resp 16   Ht 5' 2.5" (1.588 m)   Wt 121.6 kg (268 lb)   LMP 11/20/1998 (Exact Date)   SpO2 99%   Breastfeeding? No   BMI 48.24 kg/m²     "

## 2018-06-25 NOTE — OR NURSING
Brittany FIDE Park has met all discharge criteria from Phase II. Vital Signs are stable, ambulating  without difficulty. Discharge instructions given, patient verbalized understanding. Discharged from facility via wheelchair in stable condition.

## 2018-06-25 NOTE — INTERVAL H&P NOTE
The patient has been examined and the H&P has been reviewed:    I concur with the findings and no changes have occurred since H&P was written.    Anesthesia/Surgery risks, benefits and alternative options discussed and understood by patient/family.          Active Hospital Problems    Diagnosis  POA    Trigger finger [M65.30]  Yes      Resolved Hospital Problems    Diagnosis Date Resolved POA   No resolved problems to display.

## 2018-06-25 NOTE — PLAN OF CARE
Problem: Pain, Acute (Adult)  Goal: Acceptable Pain Control/Comfort Level  Patient will demonstrate the desired outcomes by discharge/transition of care.  Outcome: Outcome(s) achieved Date Met: 06/25/18  Brittany Park has met all discharge criteria from Phase II. Vital Signs are stable, ambulating  without difficulty.Pain is now under control and tolerable for the pt. Pain score 0 at this time.  Discharge instructions given, patient verbalized understanding. Discharged from facility via wheelchair in stable condition.

## 2018-06-25 NOTE — DISCHARGE INSTRUCTIONS
KEEP OPERATIVE EXTREMITY ELEVATED    KEEP DRESSING CLEAN, DRY, & INTACT UNTIL SEEN IN CLINIC          Anesthesia: Monitored Anesthesia Care (MAC)    Anesthesia Safety  · Have an adult family member or friend drive you home after the procedure.  · For the first 24 hours after your surgery:  ¨ Do not drive or use heavy equipment.  ¨ Do not make important decisions or sign documents.  ¨ Avoid alcohol.  ¨ Have someone stay with you, if possible. They can watch for problems and help keep you safe.      PLEASE FOLLOW ANY OTHER INSTRUCTIONS PROVIDED BY DR. MIR!!!!!!

## 2018-06-25 NOTE — ANESTHESIA PREPROCEDURE EVALUATION
06/25/2018  Brittany Park is a 58 y.o., female.    Anesthesia Evaluation    I have reviewed the Patient Summary Reports.    I have reviewed the Nursing Notes.   I have reviewed the Medications.     Review of Systems  Anesthesia Hx:  Slow to arouse History of prior surgery of interest to airway management or planning: Previous anesthesia: General Ulner nerve release [facility] April 2017 with general anesthesia.  Procedure performed at an [facility]. Denies Family Hx of Anesthesia complications.  Personal Hx of Anesthesia complications Slow To Awaken/Delayed Emergence and mild, somewhat sensitive to sedation / narcotics   Social:  Non-Smoker    Hematology/Oncology:  Hematology Normal   Oncology Normal     Cardiovascular:   Hypertension, well controlled    Pulmonary:   Sleep Apnea, CPAP    Renal/:  Renal/ Normal     Hepatic/GI:   GERD, well controlled    Musculoskeletal:   Arthritis     Endocrine:  Endocrine Normal    Dermatological:  Skin Normal    Psych:  Psychiatric Normal           Physical Exam  General:  Morbid Obesity    Airway/Jaw/Neck:  Airway Findings: General Airway Assessment: Possible difficult intubation, Possible difficult mask airway     Dental:  Dental Findings: molar caps        Mental Status:  Mental Status Findings:  Cooperative, Alert and Oriented         Anesthesia Plan  Type of Anesthesia, risks & benefits discussed:  Anesthesia Type:  MAC  Patient's Preference:   Intra-op Monitoring Plan:   Intra-op Monitoring Plan Comments:   Post Op Pain Control Plan:   Post Op Pain Control Plan Comments:   Induction:   IV  Beta Blocker:         Informed Consent: Patient understands risks and agrees with Anesthesia plan.  Questions answered. Anesthesia consent signed with patient.  ASA Score: 3     Day of Surgery Review of History & Physical:    H&P update referred to the surgeon.      Anesthesia Plan Notes: Plan MAC        Ready For Surgery From Anesthesia Perspective.

## 2018-07-02 NOTE — DISCHARGE SUMMARY
[facility]  Short Stay  Discharge Summary    Admit Date: 6/25/2018    Discharge Date and Time: 6/25/2018  9:00 AM      Discharge Attending Physician: No att. providers found     Hospital Course (synopsis of major diagnoses, care, treatment, and services provided during the course of the hospital stay): surgery    Final Diagnoses:    Principal Problem: <principal problem not specified>   Secondary Diagnoses:   Active Hospital Problems    Diagnosis  POA    Trigger finger [M65.30]  Yes      Resolved Hospital Problems    Diagnosis Date Resolved POA   No resolved problems to display.       Discharged Condition: good    Disposition: Home or Self Care    Follow up/Patient Instructions:    Medications:  Reconciled Home Medications:      Medication List      CONTINUE taking these medications    acetaminophen-codeine 300-30mg 300-30 mg Tab  Commonly known as:  TYLENOL #3  Take 1 tablet by mouth every 4 (four) hours as needed.     ALPRAZolam 0.25 MG tablet  Commonly known as:  XANAX  Take 0.25 mg by mouth 2 (two) times daily as needed for Anxiety.     aspirin 81 MG EC tablet  Commonly known as:  ECOTRIN  Take 81 mg by mouth once daily.     atorvastatin 40 MG tablet  Commonly known as:  LIPITOR  Take 40 mg by mouth once daily.     CALCIUM WITH VITAMIN D ORAL  Take 1 tablet by mouth 2 (two) times daily.     cetirizine 10 MG tablet  Commonly known as:  ZYRTEC  Take 10 mg by mouth once daily.     CONTRAVE 8-90 mg Tbsr  Generic drug:  naltrexone-bupropion  Take 1 tablet by mouth 2 (two) times daily.     DULoxetine 30 MG capsule  Commonly known as:  CYMBALTA  Take 2 capsules (60 mg total) by mouth once daily.     furosemide 20 MG tablet  Commonly known as:  LASIX  Take 20 mg by mouth 2 (two) times daily.     gabapentin 600 MG tablet  Commonly known as:  NEURONTIN  TAKE 1 TABLET (600 MG TOTAL) BY MOUTH 3 (THREE) TIMES DAILY.     IBUPROFEN ORAL  Take by mouth.     ketoconazole 2 % cream  Commonly known as:   NIZORAL  Apply topically once daily.     meloxicam 7.5 MG tablet  Commonly known as:  MOBIC  Take 7.5 mg by mouth once daily.     metFORMIN 500 MG tablet  Commonly known as:  GLUCOPHAGE  Take 250 mg by mouth daily with breakfast.     metoprolol tartrate 50 MG tablet  Commonly known as:  LOPRESSOR  Take 50 mg by mouth 2 (two) times daily.     ONE DAILY MULTIVITAMIN per tablet  Generic drug:  multivitamin  Take 1 tablet by mouth once daily.     pantoprazole 40 MG tablet  Commonly known as:  PROTONIX  Take 40 mg by mouth once daily.     spironolactone 25 MG tablet  Commonly known as:  ALDACTONE  Take 25 mg by mouth once daily.     UNKNOWN TO PATIENT  Apply 1 application topically once daily.     VITAMIN C 1000 MG tablet  Generic drug:  ascorbic acid (vitamin C)  Take 500 mg by mouth once daily.            Discharge Procedure Orders  Diet general     Call MD for:  temperature >100.4     Call MD for:  persistent nausea and vomiting     Call MD for:  severe uncontrolled pain     Call MD for:  difficulty breathing, headache or visual disturbances     Call MD for:  redness, tenderness, or signs of infection (pain, swelling, redness, odor or green/yellow discharge around incision site)     Call MD for:  hives     Call MD for:  persistent dizziness or light-headedness     Call MD for:  extreme fatigue     No driving, operating heavy equipment or signing legal documents while taking pain medication.     Keep surgical extremity elevated     Leave dressing on - Keep it clean, dry, and intact until clinic visit

## 2018-07-09 ENCOUNTER — OFFICE VISIT (OUTPATIENT)
Dept: ORTHOPEDICS | Facility: CLINIC | Age: 58
End: 2018-07-09
Payer: COMMERCIAL

## 2018-07-09 VITALS
SYSTOLIC BLOOD PRESSURE: 129 MMHG | BODY MASS INDEX: 47.48 KG/M2 | HEART RATE: 65 BPM | HEIGHT: 63 IN | WEIGHT: 268 LBS | DIASTOLIC BLOOD PRESSURE: 70 MMHG

## 2018-07-09 DIAGNOSIS — M65.341 TRIGGER RING FINGER OF RIGHT HAND: Primary | ICD-10-CM

## 2018-07-09 DIAGNOSIS — Z47.89 ORTHOPEDIC AFTERCARE: ICD-10-CM

## 2018-07-09 PROCEDURE — 99024 POSTOP FOLLOW-UP VISIT: CPT | Mod: S$GLB,,, | Performed by: PHYSICIAN ASSISTANT

## 2018-07-09 PROCEDURE — 99999 PR PBB SHADOW E&M-EST. PATIENT-LVL V: CPT | Mod: PBBFAC,,, | Performed by: PHYSICIAN ASSISTANT

## 2018-07-09 NOTE — PROGRESS NOTES
"Ms. Park is here today for a post-operative visit.  She is 14 days status post right ring finger A1 cy release by Dr. Broussard on 6/25/18. She reports that she is doing well.  Pain is mild.  She reports pain and stiffness with trying to extend the fingers.  She denies fever, chills, and sweats since the time of the surgery.     Physical exam:    Vitals:    07/09/18 1305   BP: 129/70   Pulse: 65   Weight: 121.6 kg (268 lb)   Height: 5' 2.5" (1.588 m)   PainSc: 0-No pain   PainLoc: Hand     Vital signs are stable, patient is afebrile.  Patient is well dressed and well groomed, no acute distress.  Alert and oriented to person, place, and time.  Post op dressing taken down.  Incision is clean, dry and intact.  There is no erythema or exudate.  There is no sign of any infection. She is NVI. Sutures removed without difficulty.      Assessment: 14 days status post right ring finger A1 pulley release    Plan:  Brittany was seen today for post-op evaluation.    Diagnoses and all orders for this visit:    Trigger ring finger of right hand    Orthopedic aftercare        - PO instruction reviewed and provided to patient  - finger ROM handout provided  - provided external orders for OT  - follow-up in 4 weeks if needed  - discussed scar massage  - call with questions or concerns    "

## 2018-07-20 DIAGNOSIS — M79.672 PAIN IN BOTH FEET: ICD-10-CM

## 2018-07-20 DIAGNOSIS — M79.671 PAIN IN BOTH FEET: ICD-10-CM

## 2018-07-20 DIAGNOSIS — G62.9 PERIPHERAL POLYNEUROPATHY: ICD-10-CM

## 2018-07-23 ENCOUNTER — TELEPHONE (OUTPATIENT)
Dept: ORTHOPEDICS | Facility: CLINIC | Age: 58
End: 2018-07-23

## 2018-07-23 NOTE — TELEPHONE ENCOUNTER
----- Message from Camila Joya sent at 7/23/2018  3:59 PM CDT -----            Name of Who is Calling: SUSANNAH HAND [[id number]]      What is the request in detail: Pt states she misplaced the order she received for physical therapy and she would like to know if a copy can be emailed to her. Her email address is [email or web address].  Please call to discuss.      Can the clinic reply by [facility]: no      [facility] [phone]

## 2018-07-24 RX ORDER — GABAPENTIN 600 MG/1
600 TABLET ORAL 3 TIMES DAILY
Qty: 90 TABLET | Refills: 5 | Status: SHIPPED | OUTPATIENT
Start: 2018-07-24 | End: 2018-10-16

## 2018-08-06 ENCOUNTER — OFFICE VISIT (OUTPATIENT)
Dept: ORTHOPEDICS | Facility: CLINIC | Age: 58
End: 2018-08-06
Payer: COMMERCIAL

## 2018-08-06 VITALS
SYSTOLIC BLOOD PRESSURE: 127 MMHG | HEIGHT: 63 IN | BODY MASS INDEX: 47.48 KG/M2 | WEIGHT: 268 LBS | HEART RATE: 57 BPM | DIASTOLIC BLOOD PRESSURE: 80 MMHG

## 2018-08-06 DIAGNOSIS — M65.341 TRIGGER RING FINGER OF RIGHT HAND: Primary | ICD-10-CM

## 2018-08-06 DIAGNOSIS — Z47.89 ORTHOPEDIC AFTERCARE: ICD-10-CM

## 2018-08-06 PROCEDURE — 99999 PR PBB SHADOW E&M-EST. PATIENT-LVL IV: CPT | Mod: PBBFAC,,, | Performed by: PHYSICIAN ASSISTANT

## 2018-08-06 PROCEDURE — 99024 POSTOP FOLLOW-UP VISIT: CPT | Mod: S$GLB,,, | Performed by: PHYSICIAN ASSISTANT

## 2018-08-06 NOTE — PROGRESS NOTES
"Ms. Park is here today for a post-operative visit.  She is 42 days status post right ring finger A1 cy release by Dr. Broussard on 6/25/18. She reports that she is doing well.  Pain is mild.  She reports pain with gripping.  She is attending OT, she is also performing scar massage.  She denies fever, chills, and sweats since the time of the surgery.   She works as a .    Physical exam:    Vitals:    08/06/18 1308   BP: 127/80   Pulse: (!) 57   Weight: 121.6 kg (268 lb)   Height: 5' 2.5" (1.588 m)   PainSc: 0-No pain     Vital signs are stable, patient is afebrile.  Patient is well dressed and well groomed, no acute distress.  Alert and oriented to person, place, and time.  Incision is healing well - clean, dry and intact. Mild thickening at the scar, mildly tender to palpation. There is no erythema or exudate.  There is no sign of any infection. She is NVI.  Good finger ROM.    Assessment: 42 days status post right ring finger A1 pulley release    Plan:  Brittany was seen today for pain and post-op evaluation.    Diagnoses and all orders for this visit:    Trigger ring finger of right hand    Orthopedic aftercare        - Patient may return to work  - Continue external OT  - follow-up as needed  - discussed scar massage  - call with questions or concerns    "

## 2018-09-11 ENCOUNTER — TELEPHONE (OUTPATIENT)
Dept: OBSTETRICS AND GYNECOLOGY | Facility: CLINIC | Age: 58
End: 2018-09-11

## 2018-09-11 DIAGNOSIS — Z12.39 BREAST CANCER SCREENING: Primary | ICD-10-CM

## 2018-09-11 NOTE — TELEPHONE ENCOUNTER
----- Message from Allison Watkins MA sent at 9/11/2018 11:19 AM CDT -----  Contact: Self  Brittany Park  MRN: [id number]  Home Phone      [phone]  Work Phone      Not on file.  Mobile          [phone]    Patient Care Team:  Clyde Almeida MD as PCP - General (Family Medicine)  Caio Gordon MD as Obstetrician (Obstetrics)  OB? No  What phone number can you be reached at? [phone]  Message: Please place and link mammogram orders to appointment scheduled on 11/5/2018.

## 2018-10-16 ENCOUNTER — OFFICE VISIT (OUTPATIENT)
Dept: NEUROLOGY | Facility: CLINIC | Age: 58
End: 2018-10-16
Payer: COMMERCIAL

## 2018-10-16 VITALS
SYSTOLIC BLOOD PRESSURE: 123 MMHG | RESPIRATION RATE: 16 BRPM | DIASTOLIC BLOOD PRESSURE: 75 MMHG | HEART RATE: 61 BPM | WEIGHT: 262.13 LBS | HEIGHT: 63 IN | BODY MASS INDEX: 46.45 KG/M2

## 2018-10-16 DIAGNOSIS — E66.01 MORBID OBESITY WITH BMI OF 45.0-49.9, ADULT: ICD-10-CM

## 2018-10-16 DIAGNOSIS — R73.01 IMPAIRED FASTING GLUCOSE: ICD-10-CM

## 2018-10-16 DIAGNOSIS — M79.641 PAIN OF RIGHT HAND: Primary | ICD-10-CM

## 2018-10-16 DIAGNOSIS — G62.9 PERIPHERAL POLYNEUROPATHY: ICD-10-CM

## 2018-10-16 PROCEDURE — 99214 OFFICE O/P EST MOD 30 MIN: CPT | Mod: S$GLB,,, | Performed by: PSYCHIATRY & NEUROLOGY

## 2018-10-16 PROCEDURE — 3078F DIAST BP <80 MM HG: CPT | Mod: CPTII,S$GLB,, | Performed by: PSYCHIATRY & NEUROLOGY

## 2018-10-16 PROCEDURE — 99999 PR PBB SHADOW E&M-EST. PATIENT-LVL III: CPT | Mod: PBBFAC,,, | Performed by: PSYCHIATRY & NEUROLOGY

## 2018-10-16 PROCEDURE — 3008F BODY MASS INDEX DOCD: CPT | Mod: CPTII,S$GLB,, | Performed by: PSYCHIATRY & NEUROLOGY

## 2018-10-16 PROCEDURE — 3074F SYST BP LT 130 MM HG: CPT | Mod: CPTII,S$GLB,, | Performed by: PSYCHIATRY & NEUROLOGY

## 2018-10-16 RX ORDER — GABAPENTIN 800 MG/1
800 TABLET ORAL 3 TIMES DAILY
Qty: 90 TABLET | Refills: 11 | Status: SHIPPED | OUTPATIENT
Start: 2018-10-16 | End: 2019-04-30 | Stop reason: SDUPTHER

## 2018-10-16 NOTE — PROGRESS NOTES
HPI:  Brittany Park is a 58 y.o. female with numbness and tingling in the bottoms of both feet and  left hand tingling. 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 She no longer has trigger, but pain at the surgery site.  She feels pain has worsened since stopping OT.     She continues with pain in the feet. Compounded cream is less effective topically over time.  She continues with gabapentin 600mg TID but this clearly helps  Back pain is episodic    She did see orthopedist about knee pain. She was advised to loose weight and she had no benefit from injections.       Review of Systems           Exam:  Gen Appearance, well developed/nourished in no apparent distress  CV: 2+ distal pulses with trace edema or swelling  Neuro:  MS: Awake, alert,Sustains attention. Recent/remote memory intact, Language is full to spontaneous speech/comprehension. Fund of Knowledge is full  CN: Optic discs are flat with normal vasculature, PERRL, Extraoccular movements and visual fields are full. Normal facial sensation and strength,  Tongue and Palate are midline and strong. Shoulder Shrug symmetric and strong.  Motor: Normal bulk, tone, no abnormal movements. 5/5 strength bilateral upper/lower extremities with 2+ reflexes in the arms and 1+ at the knees and none at the knees   Sensory: symmetric to temp, pin and vibration  Romberg mildly swaying  Cerebellar: Finger-nose,Heal-shin, Rapid alternating movements intact  Gait: Normal stance, no ataxia      Imaging: MRI L spine 3/2016: Very minimal spondylosis of the lower lumbar spine without central canal stenosis or neural foraminal  narrowing.    EMG 2016: . Mild Right Carpal Tunnel Syndrome. (Resolution of Left CTS since 7/2015 EMG/NCS is noted)  2. 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 This UE findings is new since 7/2015 EMG/NCS  3. No evidence of ulnar neuropathy at the elbow.     Knee xray 4/2018: There is bilateral medial compartment joint space narrowing with subchondral sclerosis and marginal osteophyte formation.  There is dorsal spurring from the patella.  No fracture or dislocation.  No joint effusion.      Assessment/Plan: Brittany Park is a 58 y.o. female with numbness and tingling in the bottoms of both feet and  left hand tingling. 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 Her podiatrist, Dr Posey, had noted some possible tarsal signs and her  flat footed findings. Patient may benefit from tibial injections per him, but this was never done. She has not benefited from inserts to date or Metanx.  2. In 2016, pain management did not recommend any intervention for her better controlled symptoms  EMG is not fully reliable to rule out tarsal tunnel syndrome conditions, especially with co-existing neuropathy. Intervention if needed at at later date may also help her  Polyneuropathy. Consider referral back to pain management if neuropathy pain is worse over time (she will notify me if so): Continue Cymbalta to 60 mg qd for neuropathy, as well  neuropathic cream.  Raise gabapentin dose to 800mg TID over 2-3 weeks Unless sedation, mood changes or other side effects  3. MRI L spine 2016 shows very minimal spondylosis -- back pain is episodic.  She was encouraged to reduce morbid obesity.   4. She will continue to follow fasting glucose/ now on  metformin.   She also had viral GI illness prior to onset of symptoms. No conduction block pr demylination on NCS/ nothing to suggest AIDP/CIDP by EMG.   5. She was sent to ortho for knee pain and tried injections without relief and was told to loose weight.   6. For pain after Trigger finger release (which had improve with OT): refer back to OT. See Dr Mccracken back if not improve long term discussed.     RTC 6 months

## 2018-10-16 NOTE — PATIENT INSTRUCTIONS
Raise gabapentin as follows:    For 1-2 weeks: Take 600mg in the am, 600mg at noon, and 800mg at night  For the next 1-2 weeks: 800mg in the am, 600mg at noon, and 800mg at night  After that, take 800mg three times per day.

## 2018-11-01 NOTE — PROGRESS NOTES
CHIEF COMPLAINT:    Mrs. Park is a 58 y.o. female presenting for a consultation at the request of Mazin GAONA. Patient presents with stress incontinence.    PRESENTING ILLNESS:    Brittany Park is a 58 y.o. female who has a history of stress incontinence.  Has been ongoing for several years.  She has frequency x 8-9, lately, no nocturia.  She wears a pull up and uses 3 a day if she has a cough or alleriges, and 1 at night.  The night pull up gets wet but she states she tosses and turns a lot at night.      , hysterectomy for fibroids and bleeding, not sexually active (), bowels are normal.     REVIEW OF SYSTEMS:    Review of Systems   Constitutional: Negative.    HENT: Negative.    Eyes: Negative.    Respiratory: Negative.    Cardiovascular: Negative.    Gastrointestinal: Positive for heartburn.   Genitourinary:        Stress incontinence   Musculoskeletal: Positive for joint pain.   Skin: Negative.    Neurological: Negative.    Endo/Heme/Allergies: Negative.         Prediabetic   Psychiatric/Behavioral: The patient is nervous/anxious.        PATIENT HISTORY:    Past Medical History:   Diagnosis Date    Acid reflux     Anxiety     Edema     General anesthetics causing adverse effect in therapeutic use     slow to awaken    Hypertension     FER on CPAP     Other and unspecified hyperlipidemia     Prediabetes     Urinary frequency     Urinary incontinence        Past Surgical History:   Procedure Laterality Date    ADENOIDECTOMY      APPENDECTOMY      CARPAL TUNNEL RELEASE Bilateral     right 20 years ago; left 10/2015    CHOLECYSTECTOMY      COLONOSCOPY      HYSTERECTOMY      BRETT/BSO    OOPHORECTOMY      RELEASE, TRIGGER FINGER - RIGHT RING FINGER Right 2018    [facility]    RELEASE-NERVE-ULNAR - LEFT ELBOW Left 5/3/2017    [facility] 2ND FLR    TONSILLECTOMY      TRIGGER FINGER RELEASE  Right 6/25/2018    Procedure: RELEASE, TRIGGER FINGER - RIGHT RING FINGER;  Surgeon: Violette Broussard MD;  Location: [facility];  Service: Orthopedics;  Laterality: Right;  Stretcher; Supine; Hand Pan 1 & Pan 2    ULNAR NERVE REPAIR      left side    ulner nerve      left side    UPPER GASTROINTESTINAL ENDOSCOPY         Family History   Problem Relation Age of Onset    Stroke Father     Diabetes Mother     Stroke Mother     Diabetes Brother      Socioeconomic History    Marital status:    Tobacco Use    Smoking status: Never Smoker    Smokeless tobacco: Never Used   Substance and Sexual Activity    Alcohol use: No    Drug use: No    Sexual activity: Not Currently     Birth control/protection: Surgical     Comment:        Allergies:  Morphine and Latex, natural rubber    Medications:  Outpatient Encounter Medications as of 11/2/2018   Medication Sig Dispense Refill    alprazolam (XANAX) 0.25 MG tablet Take 0.25 mg by mouth 2 (two) times daily as needed for Anxiety.      ascorbic acid, vitamin C, (VITAMIN C) 1000 MG tablet Take 500 mg by mouth once daily.       aspirin (ECOTRIN) 81 MG EC tablet Take 81 mg by mouth once daily.      atorvastatin (LIPITOR) 40 MG tablet Take 40 mg by mouth once daily.      CALCIUM CARBONATE/VITAMIN D3 (CALCIUM WITH VITAMIN D ORAL) Take 1 tablet by mouth 2 (two) times daily.      cetirizine (ZYRTEC) 10 MG tablet Take 10 mg by mouth once daily.      DULoxetine (CYMBALTA) 30 MG capsule Take 2 capsules (60 mg total) by mouth once daily. 60 capsule 11    furosemide (LASIX) 20 MG tablet Take 20 mg by mouth 3 (three) times daily.       gabapentin (NEURONTIN) 800 MG tablet Take 1 tablet (800 mg total) by mouth 3 (three) times daily. 90 tablet 11    meloxicam (MOBIC) 7.5 MG tablet Take 7.5 mg by mouth once daily.       metformin (GLUCOPHAGE) 500 MG tablet Take 250 mg by mouth daily with breakfast.       metoprolol tartrate (LOPRESSOR) 50 MG tablet Take 50  mg by mouth 2 (two) times daily.       multivitamin (ONE DAILY MULTIVITAMIN) per tablet Take 1 tablet by mouth once daily.      naltrexone-bupropion (CONTRAVE) 8-90 mg TbSR Take 1 tablet by mouth 2 (two) times daily.      pantoprazole (PROTONIX) 40 MG tablet Take 40 mg by mouth once daily.       spironolactone (ALDACTONE) 25 MG tablet Take 25 mg by mouth once daily.      UNKNOWN TO PATIENT Apply 1 application topically once daily. Cyclobenzaprine/Lidocaine      [DISCONTINUED] IBUPROFEN ORAL Take by mouth.      [DISCONTINUED] ketoconazole (NIZORAL) 2 % cream Apply topically once daily.       No facility-administered encounter medications on file as of 11/2/2018.          PHYSICAL EXAMINATION:    The patient generally appears in good health, is appropriately interactive, morbidly obese and is in no apparent distress.    Skin: No lesions.    Mental: Cooperative with normal affect.    Neuro: Grossly intact.    HEENT: Normal. No evidence of lymphadenopathy.    Chest:  normal inspiratory effort.    Abdomen:  Soft, non-tender. No masses or organomegaly. Bladder is not palpable. No evidence of flank discomfort. No evidence of inguinal hernia.    Extremities: No clubbing, cyanosis, or edema    Normal external female genitalia  Urethral meatus is normal  Urethra and bladder are nontender to bimanual exam  Well supported anteriorly and posteriorly   Uterus and cervix are normal  No adnexal masses  PVR by catheterization was 20 ml  Urethral mobility from 0-80 degrees    LABS:    Lab Results   Component Value Date    BUN 13 06/25/2018    CREATININE 0.7 06/25/2018     UA 1.010, pH 7, otherwise, negative    IMPRESSION:    Encounter Diagnoses   Name Primary?    Urinary, incontinence, stress female Yes       PLAN:    1.  The catheterized specimen was sent for culture  2.  Treatments including PT, urethral bulking agent, sling were discussed.  Also discussed weight loss improving both stress and urge symptoms in bariatric  patients.   3.  She would like to have something done.  So discussed SUDS/Cysto    Copy to:  Mazin Man and Caio Gordon

## 2018-11-02 ENCOUNTER — OFFICE VISIT (OUTPATIENT)
Dept: UROLOGY | Facility: CLINIC | Age: 58
End: 2018-11-02
Payer: COMMERCIAL

## 2018-11-02 VITALS
HEIGHT: 62 IN | BODY MASS INDEX: 48.97 KG/M2 | WEIGHT: 266.13 LBS | DIASTOLIC BLOOD PRESSURE: 73 MMHG | SYSTOLIC BLOOD PRESSURE: 124 MMHG | HEART RATE: 68 BPM

## 2018-11-02 DIAGNOSIS — N39.3 URINARY, INCONTINENCE, STRESS FEMALE: Primary | ICD-10-CM

## 2018-11-02 PROCEDURE — 3008F BODY MASS INDEX DOCD: CPT | Mod: CPTII,S$GLB,, | Performed by: UROLOGY

## 2018-11-02 PROCEDURE — 99205 OFFICE O/P NEW HI 60 MIN: CPT | Mod: 25,S$GLB,, | Performed by: UROLOGY

## 2018-11-02 PROCEDURE — 3078F DIAST BP <80 MM HG: CPT | Mod: CPTII,S$GLB,, | Performed by: UROLOGY

## 2018-11-02 PROCEDURE — 51701 INSERT BLADDER CATHETER: CPT | Mod: S$GLB,,, | Performed by: UROLOGY

## 2018-11-02 PROCEDURE — 99999 PR PBB SHADOW E&M-EST. PATIENT-LVL V: CPT | Mod: PBBFAC,,, | Performed by: UROLOGY

## 2018-11-02 PROCEDURE — 3074F SYST BP LT 130 MM HG: CPT | Mod: CPTII,S$GLB,, | Performed by: UROLOGY

## 2018-11-02 PROCEDURE — 81002 URINALYSIS NONAUTO W/O SCOPE: CPT | Mod: S$GLB,,, | Performed by: UROLOGY

## 2018-11-02 PROCEDURE — 87086 URINE CULTURE/COLONY COUNT: CPT

## 2018-11-02 RX ORDER — LIDOCAINE HYDROCHLORIDE 20 MG/ML
JELLY TOPICAL ONCE
Status: CANCELLED | OUTPATIENT
Start: 2018-11-02 | End: 2018-11-02

## 2018-11-02 RX ORDER — AMOXICILLIN 250 MG/1
500 CAPSULE ORAL ONCE
Status: CANCELLED | OUTPATIENT
Start: 2018-11-02 | End: 2018-11-02

## 2018-11-02 NOTE — PATIENT INSTRUCTIONS
Urodynamics Studies     The bladder holds urine until it leaves the body through the urethra.     Urodynamics studies are a series of tests that give your doctor a close look at the working of your bladder and urethra. The tests can help your doctor learn about any problems storing urine or voiding (eliminating) urine from your body.  Understanding the lower urinary tract  The lower part of the urinary tract has several parts.  · The bladder stores urine until youre ready to release it.  · The urethra is the tube that carries urine from the bladder out of the body.  · The sphincter is made up of muscles around the opening of the bladder. The sphincter muscles tighten to hold urine in the bladder. They relax to let urine flow. Signals from the brain tell the sphincter when to tighten and relax. These signals also tell the bladder when to contract to let urine flow out of the body.  Why you need a urodynamics study  This test may be ordered if you:  · Are incontinent (leak urine)  · Have a bladder that does not empty all the way.  · Have symptoms such as the need to urinate often or a constant strong need to urinate  · Have intermittent or weak urine stream  · Have persistent urinary tract infections  Preparing for the study  · Tell your doctor about any medicine youre taking. Ask if you should stop them before the study.  · Keep a diary of your bathroom habits. Do this for a few days before the study. This diary can be a helpful part of the evaluation.  · Ask if you need to arrive for the study with a full bladder.  Date Last Reviewed: 1/1/2017  © 3682-8870 [employer]. [address] [place] [address], [place] [address]. All rights reserved. This information is not intended as a substitute for professional medical care. Always follow your healthcare professional's instructions.          Urodynamic Studies     The equipment used for the study varies depending upon the facility and what tests are done.      Urodynamic studies may be done in your doctors office, a clinic, or a hospital. The studies may take up to an hour or more. This depends on which tests your doctor does. The tests are generally painless. You wont need sedating medicine.  Tests that may be done  Uroflowmetry. This measures the amount and speed of urine you void from your bladder. You urinate into a funnel. Its attached to a computer that records your urine flow over time. The amount of urine left in your bladder after you void may also be measured right after this test.  Cystometry. This test evaluates how much your bladder can hold. It also measures how strong your bladder muscle is and how well the signals work that tell you when your bladder is full. Your healthcare provider fills your bladder with sterile water or saline solution, through a catheter. Your doctor will instruct you to report any sensations you feel. Mention if theyre similar to symptoms youve felt at home. Your doctor may ask you to cough, stand and walk, or bear down during this test.  Electromyogram. This helps evaluate the muscle contractions that control urination, such as sphincter muscle contractions. Your healthcare provider may place electrode patches or wires near your rectum or urethra to make the recording. He or she may ask you to try to tighten or relax your sphincter muscles during this test.  Pressure flow study. This test measures your detrusor, urethral, and abdominal pressures. Detrusor is the muscle surrounding the bladder walls that relaxes to allow your bladder to fill, and and contracts to squeeze out urine. A pressure flow study is often done after cystometry. Youre asked to urinate while a probe in your urethra measures pressures.  Video cystourethrography. This takes video pictures of urine flow through your urinary tract. It can help identify blockages or other problems. The bladder is filled with an X-ray contrast fluid. Then X-ray video  pictures are taken as the fluid is urinated out. Ultrasound imaging may also be combined with routine urodynamic studies.  Ambulatory urodynamics. This test can be used to evaluate you while doing usual activities.  Getting your results  After the study, youll get dressed and return to the consultation room. Test results may be ready soon after the study is finished. Or, you may return to your doctors office in a few days for your results. Your doctor can talk with you about the study report and your options.   Date Last Reviewed: 1/1/2017 © 2000-2017 [employer]. [address] [place] [address]. All rights reserved. This information is not intended as a substitute for professional medical care. Always follow your healthcare professional's instructions.          Cystoscopy    Cystoscopy is a procedure that lets your doctor look directly inside your urethra and bladder. It can be used to:  · Help diagnose a problem with your urethra, bladder, or kidneys.  · Take a sample (biopsy) of bladder or urethral tissue.  · Treat certain problems (such as removing kidney stones).  · Place a stent to bypass an obstruction.  · Take special X-rays of the kidneys.  Based on the findings, your doctor may recommend other tests or treatments.  What is a cystoscope?  A cystoscope is a telescope-like instrument that contains lenses and fiberoptics (small glass wires that make bright light). The cystoscope may be straight and rigid, or flexible to bend around curves in the urethra. The doctor may look directly into the cystoscope, or project the image onto a monitor.  Getting ready  · Ask your doctor if you should stop taking any medicines before the procedure.  · Ask whether you should avoid eating or drinking anything after midnight before the procedure.  · Follow any other instructions your doctor gives you.  Tell your doctor before the exam if you:  · Take any medicines, such as aspirin or blood  thinners  · Have allergies to any medicines  · Are pregnant   The procedure  Cystoscopy is done in the doctors office, surgery center, or hospital. The doctor and a nurse are present during the procedure. It takes only a few minutes, longer if a biopsy, X-ray, or treatment needs to be done.  During the procedure:  · You lie on an exam table on your back, knees bent and legs apart. You are covered with a drape.  · Your urethra and the area around it are washed. Anesthetic jelly may be applied to numb the urethra. Other pain medicine is usually not needed. In some cases, you may be offered a mild sedative to help you relax. If a more extensive procedure is to be done, such as a biopsy or kidney stone removal, general anesthesia may be needed.  · The cystoscope is inserted. A sterile fluid is put into the bladder to expand it. You may feel pressure from this fluid.  · When the procedure is done, the cystoscope is removed.  After the procedure  If you had a sedative, general anesthesia, or spinal anesthesia, you must have someone drive you home. Once youre home:  · Drink plenty of fluids.  · You may have burning or light bleeding when you urinate--this is normal.  · Medicines may be prescribed to ease any discomfort or prevent infection. Take these as directed.  · Call your doctor if you have heavy bleeding or blood clots, burning that lasts more than a day, a fever over 100°F  (38° C), or trouble urinating.  Date Last Reviewed: 1/1/2017  © 0049-3336 [employer]. [address] [place] [address], [place] [address]. All rights reserved. This information is not intended as a substitute for professional medical care. Always follow your healthcare professional's instructions.

## 2018-11-02 NOTE — LETTER
November 2, 2018      Mazin Ellis PA-C  [address]  [place]  [place] [address]           [facility] 4th Floor  [address]  [facility] [address]  Phone: [phone]          Patient: Brittany Park   MR Number: [id number]   YOB: 1960   Date of Visit: 11/2/2018       Dear Mazin Ellis:    Thank you for referring Brittany Park to me for evaluation. Attached you will find relevant portions of my assessment and plan of care.    If you have questions, please do not hesitate to call me. I look forward to following Brittany Park along with you.    Sincerely,    Shannan Jalloh MD    Enclosure  CC:  No Recipients    If you would like to receive this communication electronically, please contact [email or web address] or [phone] to request more information on [employer] [facility] Link access.    For providers and/or their staff who would like to refer a patient to [facility], please contact us through our one-stop-shop provider referral line, [facility], at [phone].    If you feel you have received this communication in error or would no longer like to receive these types of communications, please e-mail [email or web address]

## 2018-11-03 LAB — BACTERIA UR CULT: NO GROWTH

## 2018-11-05 ENCOUNTER — HOSPITAL ENCOUNTER (OUTPATIENT)
Dept: RADIOLOGY | Facility: HOSPITAL | Age: 58
Discharge: HOME | End: 2018-11-05
Attending: OBSTETRICS & GYNECOLOGY
Payer: COMMERCIAL

## 2018-11-05 ENCOUNTER — OFFICE VISIT (OUTPATIENT)
Dept: OBSTETRICS AND GYNECOLOGY | Facility: CLINIC | Age: 58
End: 2018-11-05
Payer: COMMERCIAL

## 2018-11-05 VITALS
HEIGHT: 62 IN | DIASTOLIC BLOOD PRESSURE: 70 MMHG | HEART RATE: 78 BPM | BODY MASS INDEX: 48.03 KG/M2 | WEIGHT: 261 LBS | SYSTOLIC BLOOD PRESSURE: 126 MMHG | RESPIRATION RATE: 18 BRPM

## 2018-11-05 DIAGNOSIS — R32 INCONTINENCE IN FEMALE: ICD-10-CM

## 2018-11-05 DIAGNOSIS — N76.0 BV (BACTERIAL VAGINOSIS): ICD-10-CM

## 2018-11-05 DIAGNOSIS — Z12.39 BREAST CANCER SCREENING: ICD-10-CM

## 2018-11-05 DIAGNOSIS — B96.89 BV (BACTERIAL VAGINOSIS): ICD-10-CM

## 2018-11-05 DIAGNOSIS — N95.1 MENOPAUSAL STATE: ICD-10-CM

## 2018-11-05 DIAGNOSIS — Z01.419 WELL WOMAN EXAM WITH ROUTINE GYNECOLOGICAL EXAM: Primary | ICD-10-CM

## 2018-11-05 PROCEDURE — 77067 SCR MAMMO BI INCL CAD: CPT | Mod: TC

## 2018-11-05 PROCEDURE — 3078F DIAST BP <80 MM HG: CPT | Mod: CPTII,S$GLB,, | Performed by: OBSTETRICS & GYNECOLOGY

## 2018-11-05 PROCEDURE — 99999 PR PBB SHADOW E&M-EST. PATIENT-LVL III: CPT | Mod: PBBFAC,,, | Performed by: OBSTETRICS & GYNECOLOGY

## 2018-11-05 PROCEDURE — 77067 SCR MAMMO BI INCL CAD: CPT | Mod: 26,,, | Performed by: RADIOLOGY

## 2018-11-05 PROCEDURE — 99396 PREV VISIT EST AGE 40-64: CPT | Mod: S$GLB,,, | Performed by: OBSTETRICS & GYNECOLOGY

## 2018-11-05 PROCEDURE — 3074F SYST BP LT 130 MM HG: CPT | Mod: CPTII,S$GLB,, | Performed by: OBSTETRICS & GYNECOLOGY

## 2018-11-05 PROCEDURE — 77063 BREAST TOMOSYNTHESIS BI: CPT | Mod: 26,,, | Performed by: RADIOLOGY

## 2018-11-05 PROCEDURE — 77063 BREAST TOMOSYNTHESIS BI: CPT | Mod: TC

## 2018-11-05 RX ORDER — METRONIDAZOLE 500 MG/1
500 TABLET ORAL EVERY 12 HOURS
Qty: 14 TABLET | Refills: 0 | Status: SHIPPED | OUTPATIENT
Start: 2018-11-05 | End: 2018-11-12

## 2018-11-05 NOTE — PROGRESS NOTES
Subjective:    Patient ID: Brittany Park is a 58 y.o. female.     Chief Complaint: Annual Well Woman Exam     History of Present Illness:  Brittany presents today for Annual Well Woman exam. .Patient's last menstrual period was 1998 (exact date).. She is currently using no hormone replacement therapy and she reports no problems with hot flashes, night sweats, irritability and vaginal dryness. She denies breast tenderness, masses, nipple discharge.  She reports urinaryn incontinence. She is seeing Shannan Jalloh and is schdeuled for a cystometrogram and probable sling. with urination. Bowel movements have not significantly changed.    Menstrual History:   Patient's last menstrual period was 1998 (exact date).    OB History      Para Term  AB Living    3 3 3     3    SAB TAB Ectopic Multiple Live Births                       Review of Systems   Constitutional: Negative for activity change, appetite change, chills, diaphoresis, fatigue, fever and unexpected weight change.   HENT: Negative for mouth sores and tinnitus.    Eyes: Negative for discharge and visual disturbance.   Respiratory: Negative for cough, shortness of breath and wheezing.    Cardiovascular: Positive for leg swelling. Negative for chest pain and palpitations.   Gastrointestinal: Negative for abdominal pain, blood in stool, constipation, diarrhea, nausea and vomiting.   Endocrine: Positive for hot flashes. Negative for diabetes, hair loss, hyperthyroidism and hypothyroidism.   Genitourinary: Positive for vaginal odor. Negative for decreased libido, dyspareunia, dysuria, flank pain, frequency, genital sores, hematuria, menorrhagia, menstrual problem, pelvic pain, urgency, vaginal bleeding, vaginal discharge, vaginal pain, urinary incontinence and postcoital bleeding.   Musculoskeletal: Positive for back pain. Negative for joint swelling and myalgias.   Skin:  Negative for rash, no acne and hair changes.   Neurological: Negative  for seizures, syncope, numbness and headaches.   Hematological: Negative for adenopathy. Does not bruise/bleed easily.   Psychiatric/Behavioral: Negative for sleep disturbance. The patient is not nervous/anxious.    Breast: Negative for breast pain and nipple discharge        Objective:    Vital Signs:  Vitals:    11/05/18 1452   BP: 126/70   Pulse: 78   Resp: 18       Physical Exam:  General:  alert,normal appearing gravid female   Skin:  Skin color, texture, turgor normal. No rashes or lesions   HEENT:  conjunctivae/corneas clear. PERRL.   Neck: supple, trachea midline, no adenopathy or thyromegally   Respiratory:  clear to auscultation bilaterally   Heart:  regular rate and rhythm, S1, S2 normal, no murmur, click, rub or gallop   Breasts:   Nipples are protruding and have no nipple discharge. No palpable masses, erythema, skin changes, tenderness, or adenopathy.   Abdomen:  soft, non-tender. Bowel sounds normal. No masses,  no organomegaly   Pelvis: External genitalia: normal general appearance  Urinary system: urethral meatus normal, bladder nontender  Vaginal: atrophic mucosa, cystocele present, grade 1-2 , grade 1 rectocele.  Cervix: removed surgically  Uterus: removed surgically  Adnexa: removed surgically   Extremities: Normal ROM; no edema, no cyanosis   Neurologial: Normal strength and tone. No focal numbness or weakness. Reflexes 2+ and equal.   Psychiatric: normal mood, speech, dress, and thought processes         Assessment:      1. Well woman exam with routine gynecological exam    2. Incontinence in female    3. Menopausal state    4. BV (bacterial vaginosis)          Plan:      Well woman exam with routine gynecological exam    Incontinence in female    Menopausal state    BV (bacterial vaginosis)  -     metroNIDAZOLE (FLAGYL) 500 MG tablet; Take 1 tablet (500 mg total) by mouth every 12 (twelve) hours. for 7 days  Dispense: 14 tablet; Refill: 0        COUNSELING:  Brittany was counseled on A.C.O.G. Pap  guidelines and recommendations for yearly pelvic exams in addition to recommendations for yearly mammograms and monthly self breast exams. In addition she was counseled on adequate intake of calcium and vitamin D; to see her PCP for other health maintenance.

## 2018-11-20 ENCOUNTER — OFFICE VISIT (OUTPATIENT)
Dept: NEUROLOGY | Facility: CLINIC | Age: 58
End: 2018-11-20
Payer: COMMERCIAL

## 2018-11-20 VITALS
RESPIRATION RATE: 20 BRPM | DIASTOLIC BLOOD PRESSURE: 72 MMHG | HEIGHT: 62 IN | WEIGHT: 265.44 LBS | BODY MASS INDEX: 48.85 KG/M2 | SYSTOLIC BLOOD PRESSURE: 126 MMHG | HEART RATE: 64 BPM

## 2018-11-20 DIAGNOSIS — M65.349 TRIGGER RING FINGER, UNSPECIFIED LATERALITY: ICD-10-CM

## 2018-11-20 DIAGNOSIS — G62.9 PERIPHERAL POLYNEUROPATHY: Primary | ICD-10-CM

## 2018-11-20 DIAGNOSIS — T88.7XXA SIDE EFFECT OF MEDICATION: ICD-10-CM

## 2018-11-20 PROCEDURE — 3074F SYST BP LT 130 MM HG: CPT | Mod: CPTII,S$GLB,, | Performed by: PSYCHIATRY & NEUROLOGY

## 2018-11-20 PROCEDURE — 99999 PR PBB SHADOW E&M-EST. PATIENT-LVL IV: CPT | Mod: PBBFAC,,, | Performed by: PSYCHIATRY & NEUROLOGY

## 2018-11-20 PROCEDURE — 3078F DIAST BP <80 MM HG: CPT | Mod: CPTII,S$GLB,, | Performed by: PSYCHIATRY & NEUROLOGY

## 2018-11-20 PROCEDURE — 99214 OFFICE O/P EST MOD 30 MIN: CPT | Mod: S$GLB,,, | Performed by: PSYCHIATRY & NEUROLOGY

## 2018-11-20 PROCEDURE — 3008F BODY MASS INDEX DOCD: CPT | Mod: CPTII,S$GLB,, | Performed by: PSYCHIATRY & NEUROLOGY

## 2018-11-20 NOTE — PROGRESS NOTES
"HPI:  Brittany Park is a 58 y.o. female with numbness and tingling in the bottoms of both feet and  left hand tingling. 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  She states this has been worse over the past 2 weeks.  She states she does not have daily forgetfulness. But she feels "all over with her balance" and feels distracted.     When she last saw me, she raised her gabapentin dose- she last saw me for this with her pain complaint about a month ago. She is using gabapentin 800mg just nightly and 600mg in the am and and noon.   This has helped her sleep and somewhat her pain. Her feet feel "hot or burning"  Back pain is the same  She continue with therapy on the hand as per the last visit.    Review of Systems   Constitutional: Negative for fever.   HENT: Negative for nosebleeds.    Eyes: Negative for double vision.   Respiratory: Negative for hemoptysis.    Cardiovascular: Negative for leg swelling.   Gastrointestinal: Negative for blood in stool.   Genitourinary: Negative for hematuria.   Musculoskeletal: Negative for falls.   Neurological: Positive for sensory change.   Psychiatric/Behavioral: Negative for depression. The patient does not have insomnia.            Exam:  Gen Appearance, well developed/nourished in no apparent distress  CV: 2+ distal pulses with trace edema or swelling  Neuro:  MS: Awake, alert,Sustains attention. She is fully oriented times 4. Recent/remote memory intact, Language is full to spontaneous speech/comprehension. Fund of Knowledge is full  She gives good insightful history and can repeat instructions well without forgetfulness.   CN: Optic discs are flat with normal vasculature, PERRL, " Extraoccular movements and visual fields are full. Normal facial sensation and strength,  Tongue and Palate are midline and strong. Shoulder Shrug symmetric and strong.  Motor: Normal bulk, tone, no abnormal movements. 5/5 strength bilateral upper/lower extremities with 2+ reflexes in the arms and 1+ at the knees and none at the knees   Sensory: symmetric to temp, pin and vibration but reduced in the feet to pin.   Romberg mildly swaying  Cerebellar: Finger-nose,Heal-shin, Rapid alternating movements intact  Gait: Normal stance, no ataxia      Imaging: MRI L spine 3/2016: Very minimal spondylosis of the lower lumbar spine without central canal stenosis or neural foraminal narrowing.    EMG 2016: . Mild Right Carpal Tunnel Syndrome. (Resolution of Left CTS since 7/2015 EMG/NCS is noted)  2. 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 This UE findings is new since 7/2015 EMG/NCS  3. No evidence of ulnar neuropathy at the elbow.     Knee xray 4/2018: There is bilateral medial compartment joint space narrowing with subchondral sclerosis and marginal osteophyte formation.  There is dorsal spurring from the patella.  No fracture or dislocation.  No joint effusion.      Assessment/Plan: Brittany Park is a 58 y.o. female with numbness and tingling in the bottoms of both feet and  left hand tingling. 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 She returns today with some concentration complaints and poor balance after raising gabapentin dose. Reduce to 600mg TID for 3-6 weeks, then 400mg TID to look for improvement.  Notify me if not improved on lowered dose.   2. In 2016, pain management did not recommend any intervention for her better controlled  symptoms  EMG is not fully reliable to rule out tarsal tunnel syndrome conditions, especially with co-existing neuropathy. Intervention if needed at at later date may also help her  Polyneuropathy. Consider referral back to pain management if neuropathy pain is worse with lower with gabapaentin(she will notify me if so): Continue Cymbalta to 60 mg qd for neuropathy, as well  neuropathic cream.  3. Her podiatrist, Dr Posey, had noted some possible tarsal signs and her  flat footed findings. Patient may benefit from tibial injections per him, but this was never done. She has not benefited from inserts to date or Metanxx  4.  MRI L spine 2016 shows very minimal spondylosis -- back pain is episodic.  She was encouraged to reduce morbid obesity.   5. She will continue to follow fasting glucose/ now on metformin.   She also had viral GI illness prior to onset of symptoms. No conduction block pr demylination on NCS/ nothing to suggest AIDP/CIDP by EMG.   6. She was sent to ortho for knee pain and tried injections without relief and was told to loose weight.   7. For pain after Trigger finger release (which had improve with OT): She was  r2qlthsog back to OT-ongoing. See Dr Nawaf boogie if not improved long term discussed.     RTC as scheduled  CC: CARRIE Ellis

## 2018-11-20 NOTE — PATIENT INSTRUCTIONS
Reduce gabapentin to 600mg three times per day for 3-6weeks. Then reduce gabapentin to 400mg three times per day until the next appointment.  Call if pain worsens as you will need to consult again with pain management.  Call if fogginess side effect does not improve in the next 2-3 months or if worse at any point.

## 2018-11-27 RX ORDER — METRONIDAZOLE 500 MG/1
TABLET ORAL
Qty: 14 TABLET | Refills: 0 | Status: SHIPPED | OUTPATIENT
Start: 2018-11-27 | End: 2019-04-30 | Stop reason: ALTCHOICE

## 2018-11-30 ENCOUNTER — PROCEDURE VISIT (OUTPATIENT)
Dept: UROLOGY | Facility: CLINIC | Age: 58
End: 2018-11-30
Payer: COMMERCIAL

## 2018-11-30 VITALS
WEIGHT: 260 LBS | HEIGHT: 62 IN | DIASTOLIC BLOOD PRESSURE: 71 MMHG | TEMPERATURE: 97 F | SYSTOLIC BLOOD PRESSURE: 154 MMHG | BODY MASS INDEX: 47.84 KG/M2 | HEART RATE: 68 BPM

## 2018-11-30 DIAGNOSIS — N39.3 URINARY, INCONTINENCE, STRESS FEMALE: ICD-10-CM

## 2018-11-30 PROCEDURE — 51728 CYSTOMETROGRAM W/VP: CPT | Mod: 26,,, | Performed by: UROLOGY

## 2018-11-30 PROCEDURE — 51797 INTRAABDOMINAL PRESSURE TEST: CPT | Mod: 26,,, | Performed by: UROLOGY

## 2018-11-30 PROCEDURE — 52000 CYSTOURETHROSCOPY: CPT | Mod: 59,,, | Performed by: UROLOGY

## 2018-11-30 PROCEDURE — 51741 ELECTRO-UROFLOWMETRY FIRST: CPT | Mod: 26,51,, | Performed by: UROLOGY

## 2018-11-30 PROCEDURE — 51784 ANAL/URINARY MUSCLE STUDY: CPT | Mod: 26,51,, | Performed by: UROLOGY

## 2018-11-30 NOTE — PROCEDURES
Procedures     Urodynamic Report    Indication:  Severe stress incontinence    Patient was taken to the Urodynamic Suite with a comfortably full bladder and asked to perform a free uroflow.  Next, the patient was prepped and the urinary residual was drained with a 14 Fr catheter.  A 7 Fr dual lumen catheter was placed to measure intravesical pressures.  A 10 Fr balloon manometer was placed into the rectum for abdominal pressure measurements.  Patch EMG electrodes were placed on the perineum.  The patient was connected to the [employer] Urodynamic machine, using a multichannel technique, the data were interpreted.  The bladder was filled with sterile water at room temperature at a rate of 30 ml/min.  Patient is filled to urgency.  Filling is performed with the patient in the seated position.  Abdominal leak point pressures are checked at 1st desire, then serially at 50cc increments first with Valsalva then with coughing.  The patient was then asked to sit and void for a pressure flow study.    The following are the results of the study:  1.  Uroflow       Q max:  49.4 ml/sec       Voided volume:  225.6 ml       Pattern of the curve:  Intermittent with the ghada to baseline    2.  PVR:  5 ml    3.  CMG       Sensation:         First Desire:  28.6 ml         Normal Desire:  49.6 ml         Strong Desire:  100 ml         Urgency:  119.2 ml       Capacity:  102.2 ml       Abnormal Contractions:  none       Compliance:  normal    4.  Abdominal Leak Point Pressure:       Valsalva:  133.6 cm H2O at 30.5 ml infused       Cough:  252.2 cm H2O at 30.5 ml infused    5.  EMG:  Normal guarding, strain pattern with voiding    6.  Voiding phase       Q max:  81 ml/sec       P det at Q max:  19.3 cm H2O       Pattern of the curve:  intermittent       Voided volume:  77.2 ml       PVR:  25 ml    7.  Analysis:  Increased sensation, urodynamic stress incontinence, suspect a greater degree of ISD given the amount of incontinence, empties  well.      However, by history she can urinate and think she has emptied, then sit and cough or sneeze and have a gush of urine 5 minutes later.  I suspect part of the reason that her symptoms are so severe is that she does not empty.    8.  Recommendations:       A.  Discussed the issue of the significant stress incontinence and history of not emptying.  My concern is that she would have to catheterize.         B.  Discussed bulking agent and TOS, will start out with a bulking agent first, if she empties well, but the control is not enough, would consider a TOS.            CYSTOSCOPY REPORT    Pre Procedure Diagnosis:  Severe stress incontinence    Post Procedure Diagnosis:  Shortened urethra (1cm) otherwise, normal lower urinary tract    Anesthesia: 10 cc 2% lidocaine jelly applied per urethra.    14 FR Flexible Olympus cystoscope used.    FINDINGS:  Dome, anterior, posterior, lateral walls and bladder base free of urothelial abnormalities. Right and left ureteral orifices in the normal postion and configuration, both effluxed clear urine.  Bladder neck and urethra were normal.    Specimen:  none    The patient was taken to the cystoscopy suite and placed in dorsal lithotomy position.  The genitalia was prepped and draped  in the usual sterile fashion.  Two percent lidocaine jelly was inserted in the urethra.  After sufficent time had passed to allow good local anesthesia, the cystoscope was inserted in the urethra and passed into the bladder visualizing the urethra along its entire course.  The dome, anterior, posterior and lateral walls were examined systematically.  The ureteral orifices were in their usual position and configuration.  The cystoscope was turned upon itself 180 degrees to visualize the bladder neck.  The cystoscope was then brought to the level of the bladder neck, the water was turned on and the urethra was visualized.  The cystoscope was removed and the patient was instructed to urinate prior  to leaving the office.     ASSESSMENT/PLAN:  58 year old woman status post flexible cystoscopy.  1. Push fluids for 24 hours.  2. May see blood in the urine, this should gradually improve over the next 2-3 days.  3. The patient was instructed to return to the office or go to the emergency should fever, chills, cloudy urine, or inability to urinate develop.  4. Follow up for cystoscopy, urethral bulking agent.  Will plan to use Macroplastique.      Copy to:  Dr. Caio Felix and Lady Christy of the [facility],k [address], [place], [place], [address], fax [phone]

## 2018-11-30 NOTE — PATIENT INSTRUCTIONS
SIMPLE URODYNAMIC STUDY (SUDS) & CYSTOSCOPY  UROLOGY CLINIC DISCHARGE INSTRUCTIONS    You have had a procedure that will require time to properly heal. Follow the instructions you have been given on how to care for yourself once you are home. Below is additional information to help in your recovery.    ACTIVITY  · There are no restrictions in activity. Start doing again the things you did before the procedure.  · You may experience a slight burning sensation. You may notice a small amount of blood in your urine. This will clear up within a day. Call the clinic if this continues beyond 48 hours.    DIET  · Continue your normal diet. You may eat the same foods you ate before your procedure.  · Drink plenty of fluids during the first 24-48 hours following your procedure.    MEDICATIONS  · Resume all other previous medications from your prescribing physician.  · Continue any pre=procedure antibiotics until they are all gone.    SIGNS AND SYMPTOMS TO REPORT TO THE DOCTOR  · Chills or fever greater than 101° F within 24 hours of procedure.  · Changes in urination, such as increased bleeding, foul smell, cloudy urine, or painful urination.  · Call your doctor with any questions or concerns.    For any emergency situation, call 221 immediately or go to your nearest emergency room.    [facility]  [phone]    _                                                                                                                                                                                             If any problems after hours or weekends, you may call [phone] and ask for the urology resident on call.

## 2018-12-03 ENCOUNTER — TELEPHONE (OUTPATIENT)
Dept: UROLOGY | Facility: CLINIC | Age: 58
End: 2018-12-03

## 2018-12-03 DIAGNOSIS — N39.3 URINARY, INCONTINENCE, STRESS FEMALE: Primary | ICD-10-CM

## 2018-12-20 ENCOUNTER — ANESTHESIA EVENT (OUTPATIENT)
Dept: SURGERY | Facility: HOSPITAL | Age: 58
End: 2018-12-20
Payer: COMMERCIAL

## 2018-12-20 RX ORDER — AMOXICILLIN 500 MG
CAPSULE ORAL DAILY
COMMUNITY
End: 2020-02-10

## 2018-12-20 NOTE — ANESTHESIA PREPROCEDURE EVALUATION
Jie Nunez, RN   Registered Nurse      Pre Admission Screening   Signed                             []Hide copied text    []Hover for details      Anesthesia Assessment: Preoperative EQUATION     Planned Procedure: Procedure(s) (LRB):  CYSTOSCOPY (N/A)  CYSTOSCOPY, WITH PERIURETHRAL BULKING AGENT INJECTION MACROPLASTIQUE (N/A)  Requested Anesthesia Type:Monitor Anesthesia Care  Surgeon: Shannan Jalloh MD  Service: Urology  Known or anticipated Date of Surgery:1/8/2019     Surgeon notes: reviewed     Electronic QUestionnaire Assessment completed via nurse interview with patient.         No AQ        Triage considerations:      The patient has no apparent active cardiac condition (No unstable coronary Syndrome such as severe unstable angina or recent [<1 month] myocardial infarction, decompensated CHF, severe valvular   disease or significant arrhythmia)     Previous anesthesia records:GETA, MAC and Complications noted-slow to wake   Airway/Jaw/Neck:  Airway Findings: General Airway Assessment: Possible difficult intubation, Possible difficult mask airway     Dental:  Dental Findings: molar caps           Last [facility] within 3 months , [facility]   Subspecialty notes: Neurology     Other important co-morbidities:  HTN, FER, pre DM, GERD, morbid obesity     Tests already available:  Available tests,  6-12 months ago . 6/25/18 CBC, BMP                            Instructions given. (See in Nurse's note)     Optimization:  Anesthesia Preop Clinic Assessment  Indicated-not required for this procedure                   Plan:    Testing:  BMP                           Patient  has previously scheduled Medical Appointment: none     Navigation: Tests Scheduled. Will get am of surgery                                                Straight Line to surgery.                               Electronically signed by Jie Nunez RN at 12/20/2018  9:31 AM       Pre-admit on 1/8/2019            Detailed  Report                                                                                                                12/20/2018  Brittany Park is a 58 y.o., female.    Anesthesia Evaluation         Review of Systems  Anesthesia Hx:  Hx of Anesthetic complications History of prior surgery of interest to airway management or planning: Previous anesthesia: MAC  6/25/18 trigger finger with MAC.  Procedure performed at an [facility]. Denies Family Hx of Anesthesia complications.  Personal Hx of Anesthesia complications Slow To Awaken/Delayed Emergence and mild, somewhat sensitive to sedation / narcotics   Hematology/Oncology:  Hematology Normal   Oncology Normal     EENT/Dental:EENT/Dental Normal   Cardiovascular:   Hypertension  Denies Angina. hyperlipidemia  Functional Capacity 3.5 METS  Hypertension , Recent typical home B/P of 120/80   Pulmonary:   Denies Shortness of breath.  Denies Recent URI. Sleep Apnea  Obstructive Sleep Apnea (FER), CPAP used.   Renal/:  Renal Symptoms/Infections/Stones: incontinence, frequency.    Hepatic/GI:   GERD  Esophageal / Stomach Disorders Gerd Controlled by chronic antireflux medication.    Musculoskeletal:  Musculoskeletal Normal    Neurological:   Peripheral Neuropathy    Endocrine:  Diabetes (prediabetic) , controlled by oral hypoglycemics. Typical AM glucose range:   Metabolic Disorders, Morbid Obesity / BMI > 40  Psych:   anxiety          Physical Exam  General:  Well nourished, Obesity    Airway/Jaw/Neck:  Airway Findings: Mouth Opening: Normal Tongue: Normal  General Airway Assessment: Adult  Mallampati: III  Improves to III with phonation.  TM Distance: Normal, at least 6 cm     Eyes/Ears/Nose:  EYES/EARS/NOSE FINDINGS: Normal    Chest/Lungs:  Chest/Lungs Clear        Mental Status:  Mental Status Findings: Normal        Anesthesia Plan  Type of Anesthesia, risks & benefits discussed:  Anesthesia Type:  MAC  Patient's Preference:   Intra-op Monitoring Plan:  standard ASA monitors  Intra-op Monitoring Plan Comments:   Post Op Pain Control Plan: per primary service following discharge from PACU  Post Op Pain Control Plan Comments:   Induction:   IV  Beta Blocker:  Patient is not currently on a Beta-Blocker (No further documentation required).       Informed Consent: Patient understands risks and agrees with Anesthesia plan.  Questions answered. Anesthesia consent signed with patient.  ASA Score: 3     Day of Surgery Review of History & Physical:            Ready For Surgery From Anesthesia Perspective.

## 2018-12-20 NOTE — PRE ADMISSION SCREENING
Anesthesia Assessment: Preoperative EQUATION    Planned Procedure: Procedure(s) (LRB):  CYSTOSCOPY (N/A)  CYSTOSCOPY, WITH PERIURETHRAL BULKING AGENT INJECTION MACROPLASTIQUE (N/A)  Requested Anesthesia Type:Monitor Anesthesia Care  Surgeon: Shannan Jalloh MD  Service: Urology  Known or anticipated Date of Surgery:1/8/2019    Surgeon notes: reviewed    Electronic QUestionnaire Assessment completed via nurse interview with patient.        No AQ      Triage considerations:     The patient has no apparent active cardiac condition (No unstable coronary Syndrome such as severe unstable angina or recent [<1 month] myocardial infarction, decompensated CHF, severe valvular   disease or significant arrhythmia)    Previous anesthesia records:GETA, MAC and Complications noted-slow to wake   Airway/Jaw/Neck:  Airway Findings: General Airway Assessment: Possible difficult intubation, Possible difficult mask airway     Dental:  Dental Findings: molar caps         Last PCP note: within 3 months , outside [facility]   Subspecialty notes: Neurology    Other important co-morbidities:  HTN, FER, pre DM, GERD, morbid obesity     Tests already available:  Available tests,  6-12 months ago . 6/25/18 CBC, BMP            Instructions given. (See in Nurse's note)    Optimization:  Anesthesia Preop Clinic Assessment  Indicated-not required for this procedure        Plan:    Testing:  BMP     Patient  has previously scheduled Medical Appointment: none    Navigation: Tests Scheduled. Will get am of surgery                           Straight Line to surgery.

## 2019-01-04 ENCOUNTER — TELEPHONE (OUTPATIENT)
Dept: UROLOGY | Facility: CLINIC | Age: 59
End: 2019-01-04

## 2019-01-04 NOTE — TELEPHONE ENCOUNTER
Called the patient and asked if she has ever tried conservative therapies like Kegel exercises.  She states she has done them in the past and they did not help.  Will pass this information on to the pre cert people.

## 2019-01-07 ENCOUNTER — TELEPHONE (OUTPATIENT)
Dept: UROLOGY | Facility: CLINIC | Age: 59
End: 2019-01-07

## 2019-01-07 NOTE — TELEPHONE ENCOUNTER
Called pt to confirm 7am arrival time for procedure. Gave pt NPO instructions and gave pt opportunity to ask questions. Pt verbalized understanding.

## 2019-01-08 ENCOUNTER — ANESTHESIA (OUTPATIENT)
Dept: SURGERY | Facility: HOSPITAL | Age: 59
End: 2019-01-08
Payer: COMMERCIAL

## 2019-01-08 ENCOUNTER — HOSPITAL ENCOUNTER (OUTPATIENT)
Facility: HOSPITAL | Age: 59
Discharge: HOME | End: 2019-01-08
Attending: UROLOGY | Admitting: UROLOGY
Payer: COMMERCIAL

## 2019-01-08 DIAGNOSIS — N39.3 SUI (STRESS URINARY INCONTINENCE, FEMALE): Primary | ICD-10-CM

## 2019-01-08 LAB
POCT GLUCOSE: 94 MG/DL (ref 70–110)
POCT GLUCOSE: 99 MG/DL (ref 70–110)

## 2019-01-08 PROCEDURE — 63600175 PHARM REV CODE 636 W HCPCS: Performed by: NURSE ANESTHETIST, CERTIFIED REGISTERED

## 2019-01-08 PROCEDURE — 37000008 HC ANESTHESIA 1ST 15 MINUTES: Performed by: UROLOGY

## 2019-01-08 PROCEDURE — 51715 PR ENDOSCOPIC INJECTION/IMPLANT: ICD-10-PCS | Mod: ,,, | Performed by: UROLOGY

## 2019-01-08 PROCEDURE — 51715 ENDOSCOPIC INJECTION/IMPLANT: CPT | Mod: ,,, | Performed by: UROLOGY

## 2019-01-08 PROCEDURE — 71000044 HC DOSC ROUTINE RECOVERY FIRST HOUR: Performed by: UROLOGY

## 2019-01-08 PROCEDURE — 82962 GLUCOSE BLOOD TEST: CPT | Performed by: UROLOGY

## 2019-01-08 PROCEDURE — 25000003 PHARM REV CODE 250: Performed by: NURSE ANESTHETIST, CERTIFIED REGISTERED

## 2019-01-08 PROCEDURE — D9220A PRA ANESTHESIA: Mod: CRNA,,, | Performed by: NURSE ANESTHETIST, CERTIFIED REGISTERED

## 2019-01-08 PROCEDURE — D9220A PRA ANESTHESIA: ICD-10-PCS | Mod: CRNA,,, | Performed by: NURSE ANESTHETIST, CERTIFIED REGISTERED

## 2019-01-08 PROCEDURE — 25000003 PHARM REV CODE 250: Performed by: UROLOGY

## 2019-01-08 PROCEDURE — 36000706: Performed by: UROLOGY

## 2019-01-08 PROCEDURE — 71000015 HC POSTOP RECOV 1ST HR: Performed by: UROLOGY

## 2019-01-08 PROCEDURE — 37000009 HC ANESTHESIA EA ADD 15 MINS: Performed by: UROLOGY

## 2019-01-08 PROCEDURE — L8606 SYNTHETIC IMPLNT URINARY 1ML: HCPCS | Performed by: UROLOGY

## 2019-01-08 PROCEDURE — 25000003 PHARM REV CODE 250: Performed by: STUDENT IN AN ORGANIZED HEALTH CARE EDUCATION/TRAINING PROGRAM

## 2019-01-08 PROCEDURE — 71000016 HC POSTOP RECOV ADDL HR: Performed by: UROLOGY

## 2019-01-08 PROCEDURE — 36000707: Performed by: UROLOGY

## 2019-01-08 PROCEDURE — 63600175 PHARM REV CODE 636 W HCPCS: Performed by: STUDENT IN AN ORGANIZED HEALTH CARE EDUCATION/TRAINING PROGRAM

## 2019-01-08 PROCEDURE — D9220A PRA ANESTHESIA: ICD-10-PCS | Mod: ANES,,, | Performed by: ANESTHESIOLOGY

## 2019-01-08 PROCEDURE — D9220A PRA ANESTHESIA: Mod: ANES,,, | Performed by: ANESTHESIOLOGY

## 2019-01-08 DEVICE — IMPLANT MACROPLASTIQUE: Type: IMPLANTABLE DEVICE | Site: URETHRA | Status: FUNCTIONAL

## 2019-01-08 RX ORDER — FENTANYL CITRATE 50 UG/ML
INJECTION, SOLUTION INTRAMUSCULAR; INTRAVENOUS
Status: DISCONTINUED | OUTPATIENT
Start: 2019-01-08 | End: 2019-01-08

## 2019-01-08 RX ORDER — CEFAZOLIN SODIUM 1 G/3ML
2 INJECTION, POWDER, FOR SOLUTION INTRAMUSCULAR; INTRAVENOUS
Status: COMPLETED | OUTPATIENT
Start: 2019-01-08 | End: 2019-01-08

## 2019-01-08 RX ORDER — PROPOFOL 10 MG/ML
VIAL (ML) INTRAVENOUS
Status: DISCONTINUED | OUTPATIENT
Start: 2019-01-08 | End: 2019-01-08

## 2019-01-08 RX ORDER — SODIUM CHLORIDE 9 MG/ML
INJECTION, SOLUTION INTRAVENOUS CONTINUOUS
Status: DISCONTINUED | OUTPATIENT
Start: 2019-01-08 | End: 2019-01-08 | Stop reason: HOSPADM

## 2019-01-08 RX ORDER — LIDOCAINE HYDROCHLORIDE 20 MG/ML
JELLY TOPICAL
Status: DISCONTINUED | OUTPATIENT
Start: 2019-01-08 | End: 2019-01-08 | Stop reason: HOSPADM

## 2019-01-08 RX ORDER — DEXAMETHASONE SODIUM PHOSPHATE 4 MG/ML
INJECTION, SOLUTION INTRA-ARTICULAR; INTRALESIONAL; INTRAMUSCULAR; INTRAVENOUS; SOFT TISSUE
Status: DISCONTINUED | OUTPATIENT
Start: 2019-01-08 | End: 2019-01-08

## 2019-01-08 RX ORDER — MIDAZOLAM HYDROCHLORIDE 1 MG/ML
INJECTION, SOLUTION INTRAMUSCULAR; INTRAVENOUS
Status: DISCONTINUED | OUTPATIENT
Start: 2019-01-08 | End: 2019-01-08

## 2019-01-08 RX ORDER — ONDANSETRON 2 MG/ML
INJECTION INTRAMUSCULAR; INTRAVENOUS
Status: DISCONTINUED | OUTPATIENT
Start: 2019-01-08 | End: 2019-01-08

## 2019-01-08 RX ORDER — TRAMADOL HYDROCHLORIDE 50 MG/1
50 TABLET ORAL EVERY 4 HOURS PRN
Qty: 8 TABLET | Refills: 0 | Status: SHIPPED | OUTPATIENT
Start: 2019-01-08 | End: 2019-04-30

## 2019-01-08 RX ORDER — GLYCOPYRROLATE 0.2 MG/ML
INJECTION INTRAMUSCULAR; INTRAVENOUS
Status: DISCONTINUED | OUTPATIENT
Start: 2019-01-08 | End: 2019-01-08

## 2019-01-08 RX ORDER — SODIUM CHLORIDE 0.9 % (FLUSH) 0.9 %
3 SYRINGE (ML) INJECTION
Status: DISCONTINUED | OUTPATIENT
Start: 2019-01-08 | End: 2019-01-08 | Stop reason: HOSPADM

## 2019-01-08 RX ADMIN — FENTANYL CITRATE 25 MCG: 50 INJECTION, SOLUTION INTRAMUSCULAR; INTRAVENOUS at 09:01

## 2019-01-08 RX ADMIN — PROPOFOL 50 MG: 10 INJECTION, EMULSION INTRAVENOUS at 09:01

## 2019-01-08 RX ADMIN — SODIUM CHLORIDE 10 ML/HR: 0.9 INJECTION, SOLUTION INTRAVENOUS at 08:01

## 2019-01-08 RX ADMIN — DEXAMETHASONE SODIUM PHOSPHATE 4 MG: 4 INJECTION, SOLUTION INTRAMUSCULAR; INTRAVENOUS at 08:01

## 2019-01-08 RX ADMIN — GLYCOPYRROLATE 0.2 MG: 0.2 INJECTION, SOLUTION INTRAMUSCULAR; INTRAVENOUS at 08:01

## 2019-01-08 RX ADMIN — ONDANSETRON 4 MG: 2 INJECTION INTRAMUSCULAR; INTRAVENOUS at 08:01

## 2019-01-08 RX ADMIN — CEFAZOLIN 2 G: 330 INJECTION, POWDER, FOR SOLUTION INTRAMUSCULAR; INTRAVENOUS at 08:01

## 2019-01-08 RX ADMIN — MIDAZOLAM HYDROCHLORIDE 2 MG: 1 INJECTION, SOLUTION INTRAMUSCULAR; INTRAVENOUS at 08:01

## 2019-01-08 RX ADMIN — FENTANYL CITRATE 50 MCG: 50 INJECTION, SOLUTION INTRAMUSCULAR; INTRAVENOUS at 08:01

## 2019-01-08 RX ADMIN — PROPOFOL 50 MG: 10 INJECTION, EMULSION INTRAVENOUS at 08:01

## 2019-01-08 NOTE — OP NOTE
[facility]  Operative Note    Date: 01/08/2019    Pre-Op Diagnosis:   XENA, ISD  Patient Active Problem List    Diagnosis Date Noted    XENA (stress urinary incontinence, female) 01/08/2019    Trigger finger 06/25/2018    Left arm pain 03/22/2017    Arm paresthesia, left 03/22/2017    Peripheral polyneuropathy 06/23/2016    Neuralgia and neuritis 06/23/2016    Pain in both feet 06/23/2016    Hypertension       Post-Op Diagnosis: same    Procedure(s) Performed:   1.  Cystoscopy with transurethral macroplastique injection    Specimen(s): none    Staff Surgeon: Shannan Jalloh MD    Assistant Surgeon: Keon Haynes MD, Neena Loera MD     Anesthesia: Monitored Local Anesthesia with Sedation    Indications: Brittany Park is a 58 y.o. female with stress incontinence demonstrated on UDS with ISD. She presents today for macroplastique injection.      Findings: good coaptation of the urethra    Estimated Blood Loss: min    Drains: none    Procedure in Detail: After informed consent was obtained, the patient was taken to the cystoscopy suite and placed in the supine position. SCDs were applied and working. Anesthesia was administered. Once the patient was adequately sedated she was placed in dorsal lithotomy position and prepped and draped in the usual sterile fashion. Preoperative timeout was performed, and preoperative antibiotics were confirmed.     An injection cystoscope in a 22 Fr injection sheath was inserted into the urethra and advanced into the urinary bladder. Formal cystoscopy revealed normal bladder mucosa. The ureteral orifices were in the normal anatomic position bilaterally, effluxing clear urine. The Uroplasty rigid needle was inserted into the scope and the scope was backed out into the urethra. Marcroplastique was injected at the 5 and 7 o'clock regions transurethrally. Good coaptation of the urethra was seen after injection. The scope was removed from the bladder, and  there was no leakage of urine from urethra with crede maneuver.    The patient tolerated the procedure well and was transferred to recovery in stable condition.      Plan:  The patient was given prescriptions for tramadol. She will follow up with Dr. Jalloh in 2 weeks.      Keon Haynes MD    I was present for the entire case and agree with the above note.

## 2019-01-08 NOTE — PLAN OF CARE
Patient arrived from OR with NANCY Murrieta CRNA and TARIK Herzog MD.  Patient stable.  Report received at this time.  Assumed care of patient at this time.

## 2019-01-08 NOTE — DISCHARGE INSTRUCTIONS
Cystoscopy    Cystoscopy is a procedure that lets your doctor look directly inside your urethra and bladder. It can be used to:  · Help diagnose a problem with your urethra, bladder, or kidneys.  · Take a sample (biopsy) of bladder or urethral tissue.  · Treat certain problems (such as removing kidney stones).  · Place a stent to bypass an obstruction.  · Take special X-rays of the kidneys.  Based on the findings, your doctor may recommend other tests or treatments.  What is a cystoscope?  A cystoscope is a telescope-like instrument that contains lenses and fiberoptics (small glass wires that make bright light). The cystoscope may be straight and rigid, or flexible to bend around curves in the urethra. The doctor may look directly into the cystoscope, or project the image onto a monitor.  Getting ready  · Ask your doctor if you should stop taking any medicines before the procedure.  · Ask whether you should avoid eating or drinking anything after midnight before the procedure.  · Follow any other instructions your doctor gives you.  Tell your doctor before the exam if you:  · Take any medicines, such as aspirin or blood thinners  · Have allergies to any medicines  · Are pregnant   The procedure  Cystoscopy is done in the doctors office, surgery center, or hospital. The doctor and a nurse are present during the procedure. It takes only a few minutes, longer if a biopsy, X-ray, or treatment needs to be done.  During the procedure:  · You lie on an exam table on your back, knees bent and legs apart. You are covered with a drape.  · Your urethra and the area around it are washed. Anesthetic jelly may be applied to numb the urethra. Other pain medicine is usually not needed. In some cases, you may be offered a mild sedative to help you relax. If a more extensive procedure is to be done, such as a biopsy or kidney stone removal, general anesthesia may be needed.  · The cystoscope is inserted. A sterile fluid is put into  the bladder to expand it. You may feel pressure from this fluid.  · When the procedure is done, the cystoscope is removed.  After the procedure  If you had a sedative, general anesthesia, or spinal anesthesia, you must have someone drive you home. Once youre home:  · Drink plenty of fluids.  · You may have burning or light bleeding when you urinate--this is normal.  · Medicines may be prescribed to ease any discomfort or prevent infection. Take these as directed.  · Call your doctor if you have heavy bleeding or blood clots, burning that lasts more than a day, a fever over 100°F  (38° C), or trouble urinating.  Date Last Reviewed: 1/1/2017  © 6859-3973 [employer]. [address] [place] [address], [place] [address]. All rights reserved. This information is not intended as a substitute for professional medical care. Always follow your healthcare professional's instructions.        Discharge Instructions: After Your Surgery  Youve just had surgery. During surgery, you were given medicine called anesthesia to keep you relaxed and free of pain. After surgery, you may have some pain or nausea. This is common. Here are some tips for feeling better and getting well after surgery.     Stay on schedule with your medicine.   Going home  Your healthcare provider will show you how to take care of yourself when you go home. He or she will also answer your questions. Have an adult family member or friend drive you home. For the first 24 hours after your surgery:  · Do not drive or use heavy equipment.  · Do not make important decisions or sign legal papers.  · Do not drink alcohol.  · Have someone stay with you, if needed. He or she can watch for problems and help keep you safe.  Be sure to go to all follow-up visits with your healthcare provider. And rest after your surgery for as long as your healthcare provider tells you to.  Coping with pain  If you have pain after surgery, pain medicine will help you feel better. Take  it as told, before pain becomes severe. Also, ask your healthcare provider or pharmacist about other ways to control pain. This might be with heat, ice, or relaxation. And follow any other instructions your surgeon or nurse gives you.  Tips for taking pain medicine  To get the best relief possible, remember these points:  · Pain medicines can upset your stomach. Taking them with a little food may help.  · Most pain relievers taken by mouth need at least 20 to 30 minutes to start to work.  · Taking medicine on a schedule can help you remember to take it. Try to time your medicine so that you can take it before starting an activity. This might be before you get dressed, go for a walk, or sit down for dinner.  · Constipation is a common side effect of pain medicines. Call your healthcare provider before taking any medicines such as laxatives or stool softeners to help ease constipation. Also ask if you should skip any foods. Drinking lots of fluids and eating foods such as fruits and vegetables that are high in fiber can also help. Remember, do not take laxatives unless your surgeon has prescribed them.  · Drinking alcohol and taking pain medicine can cause dizziness and slow your breathing. It can even be deadly. Do not drink alcohol while taking pain medicine.  · Pain medicine can make you react more slowly to things. Do not drive or run machinery while taking pain medicine.  Your healthcare provider may tell you to take acetaminophen to help ease your pain. Ask him or her how much you are supposed to take each day. Acetaminophen or other pain relievers may interact with your prescription medicines or other over-the-counter (OTC) medicines. Some prescription medicines have acetaminophen and other ingredients. Using both prescription and OTC acetaminophen for pain can cause you to overdose. Read the labels on your OTC medicines with care. This will help you to clearly know the list of ingredients, how much to take,  and any warnings. It may also help you not take too much acetaminophen. If you have questions or do not understand the information, ask your pharmacist or healthcare provider to explain it to you before you take the OTC medicine.  Managing nausea  Some people have an upset stomach after surgery. This is often because of anesthesia, pain, or pain medicine, or the stress of surgery. These tips will help you handle nausea and eat healthy foods as you get better. If you were on a special food plan before surgery, ask your healthcare provider if you should follow it while you get better. These tips may help:  · Do not push yourself to eat. Your body will tell you when to eat and how much.  · Start off with clear liquids and soup. They are easier to digest.  · Next try semi-solid foods, such as mashed potatoes, applesauce, and gelatin, as you feel ready.  · Slowly move to solid foods. Dont eat fatty, rich, or spicy foods at first.  · Do not force yourself to have 3 large meals a day. Instead eat smaller amounts more often.  · Take pain medicines with a small amount of solid food, such as crackers or toast, to avoid nausea.     Call your surgeon if  · You still have pain an hour after taking medicine. The medicine may not be strong enough.  · You feel too sleepy, dizzy, or groggy. The medicine may be too strong.  · You have side effects like nausea, vomiting, or skin changes, such as rash, itching, or hives.       If you have obstructive sleep apnea  You were given anesthesia medicine during surgery to keep you comfortable and free of pain. After surgery, you may have more apnea spells because of this medicine and other medicines you were given. The spells may last longer than usual.   At home:  · Keep using the continuous positive airway pressure (CPAP) device when you sleep. Unless your healthcare provider tells you not to, use it when you sleep, day or night. CPAP is a common device used to treat obstructive sleep  apnea.  · Talk with your provider before taking any pain medicine, muscle relaxants, or sedatives. Your provider will tell you about the possible dangers of taking these medicines.  Date Last Reviewed: 12/1/2016  © 0856-3427 [employer]. [address] [place] [address], [place] [address]. All rights reserved. This information is not intended as a substitute for professional medical care. Always follow your healthcare professional's instructions.

## 2019-01-08 NOTE — ANESTHESIA POSTPROCEDURE EVALUATION
"Anesthesia Post Evaluation    Patient: Brittany Park    Procedure(s) Performed: Procedure(s) (LRB):  CYSTOSCOPY (N/A)  CYSTOSCOPY, WITH PERIURETHRAL BULKING AGENT INJECTION MACROPLASTIQUE (N/A)    Final Anesthesia Type: general  Patient location during evaluation: PACU  Patient participation: Yes- Able to Participate  Level of consciousness: awake and alert and oriented  Post-procedure vital signs: reviewed and stable  Pain management: adequate  Airway patency: patent  PONV status at discharge: No PONV  Anesthetic complications: no      Cardiovascular status: blood pressure returned to baseline  Respiratory status: unassisted  Hydration status: euvolemic  Follow-up not needed.        Visit Vitals  BP (!) 104/58 (BP Location: Left arm, Patient Position: Lying)   Pulse 60   Temp 37.1 °C (98.7 °F) (Oral)   Resp (!) 36   Ht 5' 2" (1.575 m)   Wt 117.5 kg (259 lb)   LMP 11/20/1998 (Exact Date)   SpO2 (!) 92%   BMI 47.37 kg/m²       Pain/Gerardo Score: Gerardo Score: 10 (1/8/2019 11:40 AM)        "

## 2019-01-08 NOTE — DISCHARGE SUMMARY
[facility]  Discharge Note  Short Stay    Admit Date: 1/8/2019    Discharge Date and Time: 01/08/2019 9:42 AM      Attending Physician: Shannan Jalloh MD     Discharge Provider: Keon Haynes MD    Diagnoses:  Active Hospital Problems    Diagnosis  POA    *XENA (stress urinary incontinence, female) [N39.3]  Yes    Trigger finger [M65.30]  Yes    Left arm pain [M79.602]  Yes    Arm paresthesia, left [R20.2]  Yes    Peripheral polyneuropathy [G62.9]  Yes    Neuralgia and neuritis [M79.2]  Yes    Pain in both feet [M79.671, M79.672]  Yes    Hypertension [I10]  Yes      Resolved Hospital Problems   No resolved problems to display.       Discharged Condition: stable    Hospital Course: Patient was admitted for cysto with macroplastique and tolerated the procedure well with no complications. The patient was discharged home in good condition on the same day.       Final Diagnoses: Same as principal problem.    Disposition: Home or Self Care    Follow up/Patient Instructions:    Medications:  Reconciled Home Medications:   Current Discharge Medication List      START taking these medications    Details   traMADol (ULTRAM) 50 mg tablet Take 1 tablet (50 mg total) by mouth every 4 (four) hours as needed for Pain.  Qty: 8 tablet, Refills: 0         CONTINUE these medications which have NOT CHANGED    Details   alprazolam (XANAX) 0.25 MG tablet Take 0.25 mg by mouth 2 (two) times daily as needed for Anxiety.      ascorbic acid, vitamin C, (VITAMIN C) 1000 MG tablet Take 500 mg by mouth once daily.       aspirin (ECOTRIN) 81 MG EC tablet Take 81 mg by mouth once daily.      atorvastatin (LIPITOR) 40 MG tablet Take 40 mg by mouth once daily.      CALCIUM CARBONATE/VITAMIN D3 (CALCIUM WITH VITAMIN D ORAL) Take 1 tablet by mouth 2 (two) times daily.      cetirizine (ZYRTEC) 10 MG tablet Take 10 mg by mouth once daily.      DULoxetine (CYMBALTA) 30 MG capsule Take 2 capsules (60 mg total) by mouth once  daily.  Qty: 60 capsule, Refills: 11    Associated Diagnoses: Peripheral polyneuropathy; Depression, unspecified depression type; Pain in both feet      fish oil-omega-3 fatty acids 300-1,000 mg capsule Take by mouth once daily.      furosemide (LASIX) 20 MG tablet Take 20 mg by mouth 3 (three) times daily.       gabapentin (NEURONTIN) 800 MG tablet Take 1 tablet (800 mg total) by mouth 3 (three) times daily.  Qty: 90 tablet, Refills: 11      meloxicam (MOBIC) 7.5 MG tablet Take 7.5 mg by mouth once daily.       metformin (GLUCOPHAGE) 500 MG tablet Take 250 mg by mouth daily with breakfast.       metoprolol tartrate (LOPRESSOR) 50 MG tablet Take 50 mg by mouth 2 (two) times daily.       multivitamin (ONE DAILY MULTIVITAMIN) per tablet Take 1 tablet by mouth once daily.      naltrexone-bupropion (CONTRAVE) 8-90 mg TbSR Take 1 tablet by mouth 2 (two) times daily.      pantoprazole (PROTONIX) 40 MG tablet Take 40 mg by mouth once daily.       spironolactone (ALDACTONE) 25 MG tablet Take 25 mg by mouth once daily.      metroNIDAZOLE (FLAGYL) 500 MG tablet TAKE 1 TABLET BY MOUTH EVERY 12 HOURS FOR 7 DAYS  Qty: 14 tablet, Refills: 0      UNKNOWN TO PATIENT Apply 1 application topically once daily. Cyclobenzaprine/Lidocaine           Discharge Procedure Orders   Call MD for:  temperature >100.4     Call MD for:  persistent nausea and vomiting or diarrhea     Call MD for:  severe uncontrolled pain     Call MD for:  difficulty breathing or increased cough     Call MD for:  severe persistent headache     Call MD for:  persistent dizziness, light-headedness, or visual disturbances     Call MD for:  increased confusion or weakness     No dressing needed     Activity as tolerated     Follow-up Information     Shannan Jalloh MD In 2 weeks.    Specialty:  Urology  Why:  s/p macroplastique  Contact information:  [address] [place] [address] [id number] [phone]                 As above.

## 2019-01-08 NOTE — TRANSFER OF CARE
"Anesthesia Transfer of Care Note    Patient: Brittany Park    Procedure(s) Performed: Procedure(s) (LRB):  CYSTOSCOPY (N/A)  CYSTOSCOPY, WITH PERIURETHRAL BULKING AGENT INJECTION MACROPLASTIQUE (N/A)    Patient location: PACU    Anesthesia Type: MAC    Transport from OR: Transported from OR on 2-3 L/min O2 by NC with adequate spontaneous ventilation    Post pain: adequate analgesia    Post assessment: no apparent anesthetic complications and tolerated procedure well    Post vital signs: stable    Level of consciousness: awake and sedated    Nausea/Vomiting: no nausea/vomiting    Complications: none    Transfer of care protocol was followed      Last vitals:   Visit Vitals  BP (!) 118/58 (BP Location: Left arm, Patient Position: Lying)   Pulse (!) 57   Temp 37.2 °C (98.9 °F) (Oral)   Resp 18   Ht 5' 2" (1.575 m)   Wt 117.5 kg (259 lb)   LMP 11/20/1998 (Exact Date)   SpO2 95%   BMI 47.37 kg/m²     "

## 2019-01-08 NOTE — H&P
[facility]  Staff: Shannan Jalloh MD    Is this patient in a research study? No    CC: Stress urinary incontinence     HPI:  Brittany Park is a 58 y.o. female who has a history of stress incontinence. Ongoing and worsening for several years.  She has frequency x 8-9, lately, no nocturia.  She wears adult diapers and uses 3 a day if she has a cough or alleriges, and 1 at night.  The night pull up gets wet but she states she tosses and turns a lot at night. She has tried conservative management with Kegel exercises but they have not helped.     ROS:  Neg except per HPI    Past Medical History:   Diagnosis Date    Acid reflux     Anxiety     Edema     General anesthetics causing adverse effect in therapeutic use     slow to awaken    Hypertension     FER on CPAP     Other and unspecified hyperlipidemia     Prediabetes     Urinary incontinence        Past Surgical History:   Procedure Laterality Date    ADENOIDECTOMY      APPENDECTOMY      CARPAL TUNNEL RELEASE Bilateral     right 20 years ago; left 10/2015    CHOLECYSTECTOMY      COLONOSCOPY      HYSTERECTOMY      BRETT/BSO    OOPHORECTOMY      RELEASE, TRIGGER FINGER - RIGHT RING FINGER Right 6/25/2018    [facility]    RELEASE-NERVE-ULNAR - LEFT ELBOW Left 5/3/2017    Performed by [facility] at [facility] OR [facility]ND [facility]    TONSILLECTOMY      ULNAR NERVE REPAIR      left side    ulner nerve      left side    UPPER GASTROINTESTINAL ENDOSCOPY       Socioeconomic History    Marital status:    Tobacco Use    Smoking status: Former Smoker    Smokeless tobacco: Never Used    Tobacco comment: quit 20 years ago   Substance and Sexual Activity    Alcohol use: No    Drug use: No    Sexual activity: Not Currently     Birth control/protection: Surgical     Comment:        Family History   Problem Relation Age of Onset    Stroke Father     Diabetes Mother     Stroke Mother      Diabetes Brother        Review of patient's allergies indicates:   Allergen Reactions    Morphine Itching    Latex, natural rubber Rash       No current facility-administered medications on file prior to encounter.      Current Outpatient Medications on File Prior to Encounter   Medication Sig Dispense Refill    alprazolam (XANAX) 0.25 MG tablet Take 0.25 mg by mouth 2 (two) times daily as needed for Anxiety.      ascorbic acid, vitamin C, (VITAMIN C) 1000 MG tablet Take 500 mg by mouth once daily.       aspirin (ECOTRIN) 81 MG EC tablet Take 81 mg by mouth once daily.      atorvastatin (LIPITOR) 40 MG tablet Take 40 mg by mouth once daily.      CALCIUM CARBONATE/VITAMIN D3 (CALCIUM WITH VITAMIN D ORAL) Take 1 tablet by mouth 2 (two) times daily.      DULoxetine (CYMBALTA) 30 MG capsule Take 2 capsules (60 mg total) by mouth once daily. 60 capsule 11    fish oil-omega-3 fatty acids 300-1,000 mg capsule Take by mouth once daily.      furosemide (LASIX) 20 MG tablet Take 20 mg by mouth 3 (three) times daily.       gabapentin (NEURONTIN) 800 MG tablet Take 1 tablet (800 mg total) by mouth 3 (three) times daily. 90 tablet 11    meloxicam (MOBIC) 7.5 MG tablet Take 7.5 mg by mouth once daily.       metformin (GLUCOPHAGE) 500 MG tablet Take 250 mg by mouth daily with breakfast.       metoprolol tartrate (LOPRESSOR) 50 MG tablet Take 50 mg by mouth 2 (two) times daily.       multivitamin (ONE DAILY MULTIVITAMIN) per tablet Take 1 tablet by mouth once daily.      pantoprazole (PROTONIX) 40 MG tablet Take 40 mg by mouth once daily.       spironolactone (ALDACTONE) 25 MG tablet Take 25 mg by mouth once daily.      cetirizine (ZYRTEC) 10 MG tablet Take 10 mg by mouth once daily.      metroNIDAZOLE (FLAGYL) 500 MG tablet TAKE 1 TABLET BY MOUTH EVERY 12 HOURS FOR 7 DAYS 14 tablet 0    naltrexone-bupropion (CONTRAVE) 8-90 mg TbSR Take 1 tablet by mouth 2 (two) times daily.      UNKNOWN TO PATIENT Apply 1  "application topically once daily. Cyclobenzaprine/Lidocaine         Anticoagulation:  Aspirin - last taken 1/1/18 (7 days ago)    Physical Exam:  Estimated body mass index is 47.55 kg/m² as calculated from the following:    Height as of 11/30/18: 5' 2" (1.575 m).    Weight as of 11/30/18: 117.9 kg (260 lb).    General: No acute distress, well developed. AAOx3  Head: Normocephalic, Atraumatic  Eyes: Extra-occular movements intact, No discharge  Neck: supple, symmetrical, trachea midline  Lungs: normal respiratory effort, no respiratory distress, no wheezes  CV: regular rate, 2+ pulses  Abdomen: soft, non-tender, non-distended, no organomegaly  : normal general appearance  MSK: no edema, no deformities, normal ROM  Skin: skin color, texture, turgor normal.  Neurologic: no focal deficits, sensation intact    Labs:    Urine dipstick today - Urine dipstick shows negative for all components.        Lab Results   Component Value Date    WBC 10.24 06/25/2018    HGB 14.3 06/25/2018    HCT 42.6 06/25/2018    MCV 92 06/25/2018     06/25/2018       BMP  Lab Results   Component Value Date     06/25/2018    K 3.7 06/25/2018     06/25/2018    CO2 28 06/25/2018    BUN 13 06/25/2018    CREATININE 0.7 06/25/2018    CALCIUM 9.3 06/25/2018    ANIONGAP 13 06/25/2018    ESTGFRAFRICA >60 06/25/2018    EGFRNONAA >60 06/25/2018       UDS 11/30/18  Uroflow       Q max:  49.4 ml/sec       Voided volume:  225.6 ml       Pattern of the curve:  Intermittent with the ghada to baseline       PVR was 25 ml.      Abdominal Leak Point Pressure:       Valsalva:  133.6 cm H2O at 30.5 ml infused       Cough:  252.2 cm H2O at 30.5 ml infused    Assessment: Brittany Park is a 58 y.o. female with severe stress incontinence     Plan:     1. To OR on 1/8/19 for Periurethral bulking using Macroplastique.   2. Consents signed   3. I have explained the risk, benefits, and alternatives of the procedure in detail. The patient voices " understanding and all questions have been answered. The patient agrees to proceed as planned.        Neena Hezrog MD     Patient seen in holding.  No changes in clinical condition.  Proceed with planned procedure.

## 2019-01-09 VITALS
BODY MASS INDEX: 47.66 KG/M2 | RESPIRATION RATE: 36 BRPM | DIASTOLIC BLOOD PRESSURE: 58 MMHG | WEIGHT: 259 LBS | HEIGHT: 62 IN | OXYGEN SATURATION: 92 % | TEMPERATURE: 99 F | SYSTOLIC BLOOD PRESSURE: 104 MMHG | HEART RATE: 60 BPM

## 2019-01-21 ENCOUNTER — OFFICE VISIT (OUTPATIENT)
Dept: UROLOGY | Facility: CLINIC | Age: 59
End: 2019-01-21
Payer: COMMERCIAL

## 2019-01-21 VITALS
HEIGHT: 62 IN | WEIGHT: 266.13 LBS | DIASTOLIC BLOOD PRESSURE: 69 MMHG | SYSTOLIC BLOOD PRESSURE: 116 MMHG | HEART RATE: 69 BPM | BODY MASS INDEX: 48.97 KG/M2

## 2019-01-21 DIAGNOSIS — Z98.890 POST-OPERATIVE STATE: Primary | ICD-10-CM

## 2019-01-21 PROBLEM — N39.3 SUI (STRESS URINARY INCONTINENCE, FEMALE): Status: RESOLVED | Noted: 2019-01-08 | Resolved: 2019-01-21

## 2019-01-21 PROCEDURE — 99999 PR PBB SHADOW E&M-EST. PATIENT-LVL IV: CPT | Mod: PBBFAC,,, | Performed by: UROLOGY

## 2019-01-21 PROCEDURE — 51798 PR MEAS,POST-VOID RES,US,NON-IMAGING: ICD-10-PCS | Mod: S$GLB,,, | Performed by: UROLOGY

## 2019-01-21 PROCEDURE — 99024 PR POST-OP FOLLOW-UP VISIT: ICD-10-PCS | Mod: S$GLB,,, | Performed by: UROLOGY

## 2019-01-21 PROCEDURE — 99999 PR PBB SHADOW E&M-EST. PATIENT-LVL IV: ICD-10-PCS | Mod: PBBFAC,,, | Performed by: UROLOGY

## 2019-01-21 PROCEDURE — 51798 US URINE CAPACITY MEASURE: CPT | Mod: S$GLB,,, | Performed by: UROLOGY

## 2019-01-21 PROCEDURE — 81002 PR URINALYSIS NONAUTO W/O SCOPE: ICD-10-PCS | Mod: S$GLB,,, | Performed by: UROLOGY

## 2019-01-21 PROCEDURE — 99024 POSTOP FOLLOW-UP VISIT: CPT | Mod: S$GLB,,, | Performed by: UROLOGY

## 2019-01-21 PROCEDURE — 81002 URINALYSIS NONAUTO W/O SCOPE: CPT | Mod: S$GLB,,, | Performed by: UROLOGY

## 2019-01-21 NOTE — PROGRESS NOTES
CHIEF COMPLAINT:    Mrs. Park is a 58 y.o. female presenting for a follow up after cystoscopy, Macroplastique injection on 1/8/2019    PRESENTING ILLNESS:    Brittany Park is a 58 y.o. female who returns for follow up.  She states she is doing remarkably well. She has had one incontinence episode after her injection which occurred with a big sneeze and cough.  However, it was contained with a panty liner.  She only wears a panty liner if she leaves the house.  When she is at home, she does not wear a panty liner or pad.  She is very pleased, feels like she empties to completion.  She had some dysuria initially, but it resolved.  She states she has slight dysuria today but the UA is completely normal.  Denies urgency/frequency symptoms.     Allergies:  Morphine and Latex, natural rubber    Medications:  Outpatient Encounter Medications as of 1/21/2019   Medication Sig Dispense Refill    alprazolam (XANAX) 0.25 MG tablet Take 0.25 mg by mouth 2 (two) times daily as needed for Anxiety.      ascorbic acid, vitamin C, (VITAMIN C) 1000 MG tablet Take 500 mg by mouth once daily.       aspirin (ECOTRIN) 81 MG EC tablet Take 81 mg by mouth once daily.      atorvastatin (LIPITOR) 40 MG tablet Take 40 mg by mouth once daily.      CALCIUM CARBONATE/VITAMIN D3 (CALCIUM WITH VITAMIN D ORAL) Take 1 tablet by mouth 2 (two) times daily.      cetirizine (ZYRTEC) 10 MG tablet Take 10 mg by mouth once daily.      DULoxetine (CYMBALTA) 30 MG capsule Take 2 capsules (60 mg total) by mouth once daily. 60 capsule 11    fish oil-omega-3 fatty acids 300-1,000 mg capsule Take by mouth once daily.      furosemide (LASIX) 20 MG tablet Take 20 mg by mouth 3 (three) times daily.       gabapentin (NEURONTIN) 800 MG tablet Take 1 tablet (800 mg total) by mouth 3 (three) times daily. 90 tablet 11    meloxicam (MOBIC) 7.5 MG tablet Take 7.5 mg by mouth once daily.       metformin (GLUCOPHAGE) 500 MG tablet Take 250 mg by mouth  daily with breakfast.       metoprolol tartrate (LOPRESSOR) 50 MG tablet Take 50 mg by mouth 2 (two) times daily.       metroNIDAZOLE (FLAGYL) 500 MG tablet TAKE 1 TABLET BY MOUTH EVERY 12 HOURS FOR 7 DAYS 14 tablet 0    multivitamin (ONE DAILY MULTIVITAMIN) per tablet Take 1 tablet by mouth once daily.      naltrexone-bupropion (CONTRAVE) 8-90 mg TbSR Take 1 tablet by mouth 2 (two) times daily.      pantoprazole (PROTONIX) 40 MG tablet Take 40 mg by mouth once daily.       spironolactone (ALDACTONE) 25 MG tablet Take 25 mg by mouth once daily.      UNKNOWN TO PATIENT Apply 1 application topically once daily. Cyclobenzaprine/Lidocaine      traMADol (ULTRAM) 50 mg tablet Take 1 tablet (50 mg total) by mouth every 4 (four) hours as needed for Pain. 8 tablet 0     No facility-administered encounter medications on file as of 1/21/2019.          PHYSICAL EXAMINATION:    The patient generally appears in good health, is appropriately interactive, and is in no apparent distress.    Skin: No lesions.    Mental: Cooperative with normal affect.    Neuro: Grossly intact.    HEENT: Normal. No evidence of lymphadenopathy.    Chest:  normal inspiratory effort.    Abdomen:  Soft, non-tender. No masses or organomegaly. Bladder is not palpable. No evidence of flank discomfort. No evidence of inguinal hernia.    Extremities: No clubbing, cyanosis, or edema    PVR by bladder scan was 8 ml    LABS:    Lab Results   Component Value Date    BUN 13 06/25/2018    CREATININE 0.7 06/25/2018     UA 1.015, pH 5, otherwise, negative    IMPRESSION:    Encounter Diagnoses   Name Primary?    Post-operative state Yes       PLAN:    1.  Follow up as needed.  If she feels she has resumption of the stress incontinence, OK to call Jadon, she will schedule and bring in to sign consents and check the urine.

## 2019-01-21 NOTE — LETTER
January 21, 2019      Clyde Almeida MD  [address]  [facility]  [place] [address]           [facility] 4th [facility]  [address]  [facility] [address]  Phone: [phone]          Patient: Brittany Park   MR Number: [id number]   YOB: 1960   Date of Visit: 1/21/2019       Dear Dr. Clyde Almeida:    Thank you for referring Brittany Park to me for evaluation. Attached you will find relevant portions of my assessment and plan of care.    If you have questions, please do not hesitate to call me. I look forward to following Brittany Park along with you.    Sincerely,    Shannan Jalloh MD    Enclosure  CC:  No Recipients    If you would like to receive this communication electronically, please contact [email or web address] or [phone] to request more information on [employer] Link access.    For providers and/or their staff who would like to refer a patient to [facility], please contact us through our one-stop-shop provider referral line, [facility], at [phone].    If you feel you have received this communication in error or would no longer like to receive these types of communications, please e-mail [email or web address]

## 2019-04-30 ENCOUNTER — OFFICE VISIT (OUTPATIENT)
Dept: NEUROLOGY | Facility: CLINIC | Age: 59
End: 2019-04-30
Payer: COMMERCIAL

## 2019-04-30 VITALS
RESPIRATION RATE: 20 BRPM | SYSTOLIC BLOOD PRESSURE: 114 MMHG | HEART RATE: 60 BPM | WEIGHT: 266.13 LBS | BODY MASS INDEX: 48.97 KG/M2 | HEIGHT: 62 IN | DIASTOLIC BLOOD PRESSURE: 72 MMHG

## 2019-04-30 DIAGNOSIS — M79.644 CHRONIC PAIN OF RIGHT THUMB: ICD-10-CM

## 2019-04-30 DIAGNOSIS — R73.01 IMPAIRED FASTING GLUCOSE: ICD-10-CM

## 2019-04-30 DIAGNOSIS — G62.9 PERIPHERAL POLYNEUROPATHY: Primary | ICD-10-CM

## 2019-04-30 DIAGNOSIS — E66.01 MORBID OBESITY WITH BMI OF 45.0-49.9, ADULT: ICD-10-CM

## 2019-04-30 DIAGNOSIS — M54.50 LUMBAR PAIN: ICD-10-CM

## 2019-04-30 DIAGNOSIS — G89.29 CHRONIC PAIN OF RIGHT THUMB: ICD-10-CM

## 2019-04-30 PROCEDURE — 99214 OFFICE O/P EST MOD 30 MIN: CPT | Mod: S$GLB,,, | Performed by: PSYCHIATRY & NEUROLOGY

## 2019-04-30 PROCEDURE — 3074F SYST BP LT 130 MM HG: CPT | Mod: CPTII,S$GLB,, | Performed by: PSYCHIATRY & NEUROLOGY

## 2019-04-30 PROCEDURE — 3078F DIAST BP <80 MM HG: CPT | Mod: CPTII,S$GLB,, | Performed by: PSYCHIATRY & NEUROLOGY

## 2019-04-30 PROCEDURE — 99999 PR PBB SHADOW E&M-EST. PATIENT-LVL IV: CPT | Mod: PBBFAC,,, | Performed by: PSYCHIATRY & NEUROLOGY

## 2019-04-30 PROCEDURE — 3078F PR MOST RECENT DIASTOLIC BLOOD PRESSURE < 80 MM HG: ICD-10-PCS | Mod: CPTII,S$GLB,, | Performed by: PSYCHIATRY & NEUROLOGY

## 2019-04-30 PROCEDURE — 3008F BODY MASS INDEX DOCD: CPT | Mod: CPTII,S$GLB,, | Performed by: PSYCHIATRY & NEUROLOGY

## 2019-04-30 PROCEDURE — 3074F PR MOST RECENT SYSTOLIC BLOOD PRESSURE < 130 MM HG: ICD-10-PCS | Mod: CPTII,S$GLB,, | Performed by: PSYCHIATRY & NEUROLOGY

## 2019-04-30 PROCEDURE — 3008F PR BODY MASS INDEX (BMI) DOCUMENTED: ICD-10-PCS | Mod: CPTII,S$GLB,, | Performed by: PSYCHIATRY & NEUROLOGY

## 2019-04-30 PROCEDURE — 99999 PR PBB SHADOW E&M-EST. PATIENT-LVL IV: ICD-10-PCS | Mod: PBBFAC,,, | Performed by: PSYCHIATRY & NEUROLOGY

## 2019-04-30 PROCEDURE — 99214 PR OFFICE/OUTPT VISIT, EST, LEVL IV, 30-39 MIN: ICD-10-PCS | Mod: S$GLB,,, | Performed by: PSYCHIATRY & NEUROLOGY

## 2019-04-30 RX ORDER — GABAPENTIN 800 MG/1
800 TABLET ORAL 3 TIMES DAILY
Qty: 90 TABLET | Refills: 11 | Status: SHIPPED | OUTPATIENT
Start: 2019-04-30 | End: 2020-08-10

## 2019-04-30 NOTE — PROGRESS NOTES
HPI: Brittany Park is a 59 y.o. female with numbness and tingling in the bottoms of both feet and  left hand tingling. 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 Her pain is better but continues in the bottom of both feet with neuropathy and she states this is severe  7/10 pain on average, burning.     Back pain continues as prior.     She has pain on moving the right thumb which feels aching    Her pain post trigger finger surgery is resolved with OT    Review of Systems   Constitutional: Negative for fever.   HENT: Negative for nosebleeds.    Eyes: Negative for double vision.   Respiratory: Negative for hemoptysis.    Cardiovascular: Negative for leg swelling.   Gastrointestinal: Positive for blood in stool.        Some blood (bright red) with wiping noted and some blood noted in the toliet at times- she will see PCP regarding this as she feels she may be due for colonscopy   Genitourinary: Negative for hematuria.   Musculoskeletal: Negative for falls.   Skin: Negative for rash.   Neurological: Positive for sensory change.   Psychiatric/Behavioral: The patient does not have insomnia.        Exam:  Gen Appearance, well developed/nourished in no apparent distress  CV: 2+ distal pulses with trace edema or swelling  Neuro:  MS: Awake, alert,Sustains attention. She is fully oriented times 4. Recent/remote memory intact, Language is full to spontaneous speech/comprehension. Fund of Knowledge is full  She gives good insightful history and can repeat instructions well without forgetfulness.   CN: Optic discs are flat with normal  vasculature, PERRL, Extraoccular movements and visual fields are full. Normal facial sensation and strength,  Tongue and Palate are midline and strong. Shoulder Shrug symmetric and strong.  Motor: Normal bulk, tone, no abnormal movements. 5/5 strength bilateral upper/lower extremities with 2+ reflexes in the arms and 1+ at the knees and none at the knees   Sensory: symmetric to temp, pin and vibration but reduced in the feet to pin.   Romberg mildly swaying  Cerebellar: Finger-nose,Heal-shin, Rapid alternating movements intact  Gait: Normal stance, no ataxia      Imaging: MRI L spine 3/2016: Very minimal spondylosis of the lower lumbar spine without central canal stenosis or neural foraminal narrowing.    EMG 2016: . Mild Right Carpal Tunnel Syndrome. (Resolution of Left CTS since 7/2015 EMG/NCS is noted)  2. 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 This UE findings is new since 7/2015 EMG/NCS  3. No evidence of ulnar neuropathy at the elbow.     Knee xray 4/2018: There is bilateral medial compartment joint space narrowing with subchondral sclerosis and marginal osteophyte formation.  There is dorsal spurring from the patella.  No fracture or dislocation.  No joint effusion.      Assessment/Plan: Brittany Park is a 59 y.o. female with numbness and tingling in the bottoms of both feet and  left hand tingling. 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 Due to concentration complaints and poor balance after raising gabapentin dose, the dose was reduced to  400mg TID. Her side effects are now resolved, she is tolerating the medication well and continues being back at 800mg TID dosing with some improved pain control, but not enough.   2. In  2016, pain management did not recommend any intervention for her better controlled symptoms  -However, her symptoms of pain are severe, but she can't increase her doses of mediations further. She may benefit from injections or even a spinal cord stimulator. Will request a second opinion from pain management per orders.  3. Continue Cymbalta to 60 mg qd for neuropathy, as well  neuropathic cream.  4. Her podiatrist, Dr Posey, had noted some possible tarsal signs and her  flat footed findings.  Note EMG is not fully reliable to rule out tarsal tunnel syndrome conditions, especially with co-existing neuropathy . Patient may benefit from tibial injections per him, but this was never done. She has not benefited from inserts to date or Metanxx  4.  MRI L spine 2016 shows very minimal spondylosis -- back pain persists and she will further discuss this with pain management  She was encouraged to reduce morbid obesity.   5. She will continue to follow fasting glucose with PCP/ now on metformin.   She also had viral GI illness prior to onset of symptoms. No conduction block pr demylination on NCS/ nothing to suggest AIDP/CIDP by EMG.   6. She was sent to ortho for knee pain and tried injections without relief and was told to loose weight.   7. Advised: See Dr Nawaf boogie per orders for right thumb pain which is likely joint mediated.  RTC 6 months

## 2019-05-02 ENCOUNTER — TELEPHONE (OUTPATIENT)
Dept: ORTHOPEDICS | Facility: CLINIC | Age: 59
End: 2019-05-02

## 2019-05-09 ENCOUNTER — TELEPHONE (OUTPATIENT)
Dept: ORTHOPEDICS | Facility: CLINIC | Age: 59
End: 2019-05-09

## 2019-05-09 DIAGNOSIS — M79.641 RIGHT HAND PAIN: ICD-10-CM

## 2019-05-09 DIAGNOSIS — R52 PAIN: Primary | ICD-10-CM

## 2019-05-13 ENCOUNTER — HOSPITAL ENCOUNTER (OUTPATIENT)
Dept: RADIOLOGY | Facility: OTHER | Age: 59
Discharge: HOME | End: 2019-05-13
Attending: PHYSICIAN ASSISTANT
Payer: COMMERCIAL

## 2019-05-13 ENCOUNTER — OFFICE VISIT (OUTPATIENT)
Dept: ORTHOPEDICS | Facility: CLINIC | Age: 59
End: 2019-05-13
Payer: COMMERCIAL

## 2019-05-13 VITALS
BODY MASS INDEX: 48.95 KG/M2 | SYSTOLIC BLOOD PRESSURE: 126 MMHG | HEIGHT: 62 IN | DIASTOLIC BLOOD PRESSURE: 80 MMHG | HEART RATE: 62 BPM | WEIGHT: 266 LBS

## 2019-05-13 DIAGNOSIS — M79.641 RIGHT HAND PAIN: ICD-10-CM

## 2019-05-13 DIAGNOSIS — M18.11 ARTHRITIS OF CARPOMETACARPAL (CMC) JOINT OF RIGHT THUMB: Primary | ICD-10-CM

## 2019-05-13 PROBLEM — M65.30 TRIGGER FINGER: Status: RESOLVED | Noted: 2018-06-25 | Resolved: 2019-05-13

## 2019-05-13 PROCEDURE — 73130 X-RAY EXAM OF HAND: CPT | Mod: 26,RT,, | Performed by: RADIOLOGY

## 2019-05-13 PROCEDURE — 99213 PR OFFICE/OUTPT VISIT, EST, LEVL III, 20-29 MIN: ICD-10-PCS | Mod: S$GLB,,, | Performed by: PHYSICIAN ASSISTANT

## 2019-05-13 PROCEDURE — 73130 X-RAY EXAM OF HAND: CPT | Mod: TC,FY,RT

## 2019-05-13 PROCEDURE — 99999 PR PBB SHADOW E&M-EST. PATIENT-LVL V: CPT | Mod: PBBFAC,,, | Performed by: PHYSICIAN ASSISTANT

## 2019-05-13 PROCEDURE — 3008F PR BODY MASS INDEX (BMI) DOCUMENTED: ICD-10-PCS | Mod: CPTII,S$GLB,, | Performed by: PHYSICIAN ASSISTANT

## 2019-05-13 PROCEDURE — 3079F DIAST BP 80-89 MM HG: CPT | Mod: CPTII,S$GLB,, | Performed by: PHYSICIAN ASSISTANT

## 2019-05-13 PROCEDURE — 3079F PR MOST RECENT DIASTOLIC BLOOD PRESSURE 80-89 MM HG: ICD-10-PCS | Mod: CPTII,S$GLB,, | Performed by: PHYSICIAN ASSISTANT

## 2019-05-13 PROCEDURE — 99999 PR PBB SHADOW E&M-EST. PATIENT-LVL V: ICD-10-PCS | Mod: PBBFAC,,, | Performed by: PHYSICIAN ASSISTANT

## 2019-05-13 PROCEDURE — 3074F SYST BP LT 130 MM HG: CPT | Mod: CPTII,S$GLB,, | Performed by: PHYSICIAN ASSISTANT

## 2019-05-13 PROCEDURE — 3074F PR MOST RECENT SYSTOLIC BLOOD PRESSURE < 130 MM HG: ICD-10-PCS | Mod: CPTII,S$GLB,, | Performed by: PHYSICIAN ASSISTANT

## 2019-05-13 PROCEDURE — 99213 OFFICE O/P EST LOW 20 MIN: CPT | Mod: S$GLB,,, | Performed by: PHYSICIAN ASSISTANT

## 2019-05-13 PROCEDURE — 3008F BODY MASS INDEX DOCD: CPT | Mod: CPTII,S$GLB,, | Performed by: PHYSICIAN ASSISTANT

## 2019-05-13 PROCEDURE — 73130 XR HAND COMPLETE 3 VIEW RIGHT: ICD-10-PCS | Mod: 26,RT,, | Performed by: RADIOLOGY

## 2019-05-13 NOTE — PROGRESS NOTES
Subjective:      Patient ID: Brittany Park is a 59 y.o. female.    Chief Complaint: Pain of the Right Hand      HPI  Brittany Park is a right hand dominant 59 y.o. female presenting today for evaluation of right thumb pain.  She reports that she has new pain in the right thumb that extends up the forearm radiating to the shoulder.  She reports that pain is increased with gripping and holding items with weight, such as her water bottle.  She also has increased pain with opening jars.  She denies any known increase in her thumb pain with just thumb motion. She denies any finger numbness or tingling.      Review of patient's allergies indicates:   Allergen Reactions    Morphine Itching    Latex, natural rubber Rash         Current Outpatient Medications   Medication Sig Dispense Refill    alprazolam (XANAX) 0.25 MG tablet Take 0.25 mg by mouth 2 (two) times daily as needed for Anxiety.      ascorbic acid, vitamin C, (VITAMIN C) 1000 MG tablet Take 500 mg by mouth once daily.       aspirin (ECOTRIN) 81 MG EC tablet Take 81 mg by mouth once daily.      atorvastatin (LIPITOR) 40 MG tablet Take 40 mg by mouth once daily.      CALCIUM CARBONATE/VITAMIN D3 (CALCIUM WITH VITAMIN D ORAL) Take 1 tablet by mouth 2 (two) times daily.      cetirizine (ZYRTEC) 10 MG tablet Take 10 mg by mouth once daily.      fish oil-omega-3 fatty acids 300-1,000 mg capsule Take by mouth once daily.      furosemide (LASIX) 20 MG tablet Take 20 mg by mouth 3 (three) times daily.       gabapentin (NEURONTIN) 800 MG tablet Take 1 tablet (800 mg total) by mouth 3 (three) times daily. 90 tablet 11    meloxicam (MOBIC) 7.5 MG tablet Take 7.5 mg by mouth once daily.       metformin (GLUCOPHAGE) 500 MG tablet Take 250 mg by mouth daily with breakfast.       metoprolol tartrate (LOPRESSOR) 50 MG tablet Take 50 mg by mouth 2 (two) times daily.       multivitamin (ONE DAILY MULTIVITAMIN) per tablet Take 1 tablet by mouth once daily.       pantoprazole (PROTONIX) 40 MG tablet Take 40 mg by mouth once daily.       spironolactone (ALDACTONE) 25 MG tablet Take 25 mg by mouth once daily.      UNKNOWN TO PATIENT Apply 1 application topically once daily. Cyclobenzaprine/Lidocaine      DULoxetine (CYMBALTA) 30 MG capsule Take 2 capsules (60 mg total) by mouth once daily. (Patient taking differently: Take 30 mg by mouth 2 (two) times daily. ) 60 capsule 11     No current facility-administered medications for this visit.        Past Medical History:   Diagnosis Date    Acid reflux     Anxiety     Edema     General anesthetics causing adverse effect in therapeutic use     slow to awaken    Hypertension     FER on CPAP     Other and unspecified hyperlipidemia     Prediabetes     Urinary incontinence        Past Surgical History:   Procedure Laterality Date    ADENOIDECTOMY      APPENDECTOMY      CARPAL TUNNEL RELEASE Bilateral     right 20 years ago; left 10/2015    CHOLECYSTECTOMY      COLONOSCOPY      CYSTOSCOPY N/A 1/8/2019    [facility] at [facility] 1ST FLR    CYSTOSCOPY, WITH PERIURETHRAL BULKING AGENT INJECTION MACROPLASTIQUE N/A 1/8/2019    Performed by [facility]oh [facility] at [facility] OR 1ST FLR    HYSTERECTOMY      BRETT/BSO    OOPHORECTOMY      RELEASE, TRIGGER FINGER - RIGHT RING FINGER Right 6/25/2018    Performed by Violette [facility]ard [facility] at [facility] [place]    RELEASE-NERVE-ULNAR - LEFT ELBOW Left 5/3/2017    Performed by [facility] at [facility] OR 2ND FLR    TONSILLECTOMY      ULNAR NERVE REPAIR      left side    ulner nerve      left side    UPPER GASTROINTESTINAL ENDOSCOPY           Review of Systems:  Review of Systems   Constitution: Negative for chills and fever.   Skin: Negative for rash and suspicious lesions.   Musculoskeletal:        See HPI   Neurological: Negative for dizziness, headaches, light-headedness, numbness and paresthesias.   Psychiatric/Behavioral: Negative for depression. The  "patient is not nervous/anxious.          OBJECTIVE:     PHYSICAL EXAM:  Height: 5' 2" (157.5 cm) Weight: 120.7 kg (266 lb)  Vitals:    05/13/19 0837   BP: 126/80   Pulse: 62   Weight: 120.7 kg (266 lb)   Height: 5' 2" (1.575 m)   PainSc:   4     General    Vitals reviewed.  Constitutional: She is oriented to person, place, and time. She appears well-developed and well-nourished.   HENT:   Head: Normocephalic and atraumatic.   Neck: Normal range of motion.   Cardiovascular: Normal rate.    Pulmonary/Chest: Effort normal. No respiratory distress.   Neurological: She is alert and oriented to person, place, and time.   Psychiatric: She has a normal mood and affect. Her behavior is normal. Judgment and thought content normal.             Musculoskeletal:  Well-healed surgical scars in the palm from prior trigger finger releases.  No edema appreciated.  Tender to palpation at the right radiocarpal joint and the right CMC joint, also tender at the right thumb A1 pulley.  Most discretely tender at the CMC joint, positive grind test on the right.  Nontender at the left CMC, negative grind on the left.  Good finger and wrist range of motion.  Negative Finkelstein's on the right. No triggering noted on exam.  Neurovascularly intact-good sensation and motor function, good capillary refill, 2+ radial pulses.    RADIOGRAPHS:  Right Hand XRay, 5/13/19  FINDINGS:  Mild degenerative changes of the distal interphalangeal joints and 1st carpometacarpal joints.  No evidence of acute fracture, dislocation or bone destruction.  Alignment is satisfactory.  No abnormal foreign body.      Impression       Mild degenerative changes.  No acute bony abnormalities.       Comments: I have personally reviewed the imaging and I agree with the above radiologist's report.    ASSESSMENT/PLAN:   Brittany was seen today for pain.    Diagnoses and all orders for this visit:    Arthritis of carpometacarpal (CMC) joint of right thumb        - discussed " conservative and surgical treatment options for CMC arthritis. Discussed compound cream, bracing, paraffin, therapy, steroid injection, and surgery.  - compound cream sent to pharmacy  - [facility] packet provided  - Call with questions or concerns  - Follow up in 2-3 months    Disclaimer: This note has been generated using voice-recognition software. There may be typographical errors that have been missed during proof-reading.

## 2019-05-13 NOTE — PATIENT INSTRUCTIONS
Understanding Carpometacarpal Osteoarthritis    The base of the thumb where it meets the hand is called the carpometacarpal (CMC) joint. This joint allows the thumb to move freely in many directions. It also provides strength so the hand can grasp and .  A smooth tissue called cartilage lines and cushions the bones of the CMC joint. Using the thumb puts stress on the joint. Over time, this can lead to the breakdown of the cartilage in the joint. This is known as osteoarthritis. With this condition, bones of the joint may be exposed and rub together. They may become irritated and rough. This keeps the joint from moving smoothly and can lead to pain.     How to say it  [id number]-[id number]-[id number]      What causes CMC joint osteoarthritis?    This type of osteoarthritis is mainly caused by years of using the hand and thumb. The condition may be more likely if you:  · Regularly do things that put great stress on the thumb joint  · Have had previous thumb injuries  · Have weakened or loose structures in the thumb  · Are a woman who is past menopause  Symptoms of CMC joint osteoarthritis  Symptoms commonly include:  · Thumb pain. It may get worse with pinching or gripping.  · Thumb weakness  · Sounds of grinding or popping in the thumb joint  · Base of the thumb is enlarged   Treatment for CMC joint osteoarthritis  Osteoarthritis is a long-term (chronic) condition. Treatment focuses on managing symptoms. It may include:  · Taking prescription or over-the-counter pain medicines to help reduce pain and swelling  · Using a brace to rest and support the thumb joint  · Using hot or cold therapy to help relieve pain  · Learning and practicing ways to reduce stress on the thumb joint  · Using devices that help protect the joint. These include jar openers, pen , and spring-action scissors.  · Following a plan of physical therapy and exercises. This will help improve the flexibility and strength of the hand and  thumb.  · Getting shots of medicine into the joint to help relieve symptoms for a time  If these treatments dont do enough to relieve severe pain, you may need surgery. There are several different types of surgery. In general, the goal is to relieve pain and help you to be able to use the hand.     When to call your healthcare provider  Call your healthcare provider right away if you have any of these:  · Fever of 100.4°F (38°C) or higher, or as directed  · Symptoms that dont get better, or get worse  · New symptoms   Date Last Reviewed: 3/10/2016  © 3069-5110 [employer]. [address] [place] [address], [place] [address]. All rights reserved. This information is not intended as a substitute for professional medical care. Always follow your healthcare professional's instructions.

## 2019-05-15 ENCOUNTER — TELEPHONE (OUTPATIENT)
Dept: ORTHOPEDICS | Facility: CLINIC | Age: 59
End: 2019-05-15

## 2019-05-15 NOTE — TELEPHONE ENCOUNTER
----- Message from Raj Thao sent at 5/14/2019  2:42 PM CDT -----  Contact: SUSANNAH HAND [[id number]]  Name of Who is Calling:       What is the request in detail: Pt is requesting a call back from clinical team in regard to pain in hand. Pt states try a compound pain cream on it but its not working.Please contact to further discuss and advise.          Can the clinic reply by [facility]:       What Number to Call Back [facility]: [phone]

## 2019-06-07 ENCOUNTER — TELEPHONE (OUTPATIENT)
Dept: PAIN MEDICINE | Facility: CLINIC | Age: 59
End: 2019-06-07

## 2019-06-07 NOTE — TELEPHONE ENCOUNTER
----- Message from Wilma Anne MA sent at 2019 11:39 AM CDT -----  Contact: Patient  Brittany Park  MRN: [id number]  : 1960  PCP: Clyde Almeida  Home Phone      [phone]  Work Phone      Not on file.  Mobile          [phone]      MESSAGE:  Patient is needing to reschedule her appointment due to bad weather. She can be reached at [phone].

## 2019-06-17 NOTE — H&P (VIEW-ONLY)
Chronic patient Established Note (Follow up visit)        SUBJECTIVE:    HPI: 6/21/19 Britatny Park presents to the clinic with lumbar pain.  Since the last visit, Brittany Park states the pain has been worsening . Current pain intensity is 8 /10.    Her CC is low back pain, onset years, pain is low back only, she reports no radicular component, pain increases with 5-10  mins  periods of standing, sitting and walking, pain is described as burning and a knife stabbing, aleve mitigates pain at this point. Her pain presents as facet mediate, 2016 MRI shows at L4-5 a minimal broad-based posterior disc bulge and facet arthropathy without central canal stenosis or neural foraminal also has bilateral facet arthropathy L5-S1 without central or neural foraminal.  I will schedule for bilateral lumbar MBB at L3, 4, 5 1 of 2 and RFA if appropriate, discussed pain diary in the importance of bringing his back to clinic on follow-up she states she understood and agreed.     HPI: 11/11/19  Brittany Park presents to the clinic for a follow-up appointment for bilateral foot pain. She continues to have burning at night in her feet which is her worse pain.  She also has numbing in her hands bilaterally, the numbing comes on when she is using her hands.  She has seen  appt Dr. Borges(podiatrist) and was given cream, and Arch Supports, she has seen neurology and her Gabapentin was increased to 600 mg TID. She will f/u with neurology in 3 weeks to see if the increase in gabapentin has helped neuropathic pain. They may consider switching to Cymbalta if results from increase are not positive.     Since the last visit, Brittany Park states the pain has been worsening. Current pain intensity is 5/10.       Pain Disability Index Review:  Last 3 PDI Scores 11/11/2016 6/23/2016   Pain Disability Index (PDI) 42 34         Pain Medications:     - Opioids: None  - Adjuvant Medications: Mobic (Meloxicam), gabapentin and xanax  -  Anti-Coagulants: Aspirin  - Others: n/a                                                                                        Opioid Contract: no      report:  Reviewed and consistent with medication use as prescribed.     Pain Procedures: None     Physical Therapy/Home Exercise: yes (home exercises)     Imaging:   Technique: Sagittal T1, sagittal T2, sagittal STIR, axial T1 and axial T2 images of the lumbar spine were obtained without contrast.    Results: The incidentally visualized soft tissues structures of the abdomen are within normal limits. Vertebral body alignment and heights are maintained. There is disc desiccation in the mid and lower lumbar spine with relative preservation of disc space height. The marrow signal is normal without evidence for a marrow replacement process, infection or tumor. The conus terminates at L1.    At L1-2, no disk herniation, central canal stenosis or neural foraminal narrowing.    At L2-3, no disk herniation, central canal stenosis or neural foraminal narrowing.    At L3-4, no disk herniation, central canal stenosis or neural foraminal narrowing.    At L4-5, there is a minimal broad-based posterior disc bulge and facet arthropathy without central canal stenosis or neural foraminal narrowing.    At L5-S1, there is bilateral facet arthropathy without central canal stenosis or neural foraminal narrowing.   Impression       Very minimal spondylosis of the lower lumbar spine without central canal stenosis or neural foraminal narrowing.  ______________________________________                   Allergies:        Allergies   Allergen Reactions    Morphine Itching    Latex, Natural Rubber Rash         Current Medications:   Current Medications          Current Outpatient Prescriptions   Medication Sig Dispense Refill    alprazolam (XANAX) 0.25 MG tablet Take 0.25 mg by mouth 2 (two) times daily as needed for Anxiety.        aspirin (ECOTRIN) 81 MG EC tablet Take 81 mg by mouth  once daily.        atorvastatin (LIPITOR) 40 MG tablet Take 40 mg by mouth once daily.        CALCIUM CARBONATE/VITAMIN D3 (CALCIUM WITH VITAMIN D ORAL) Take 1 tablet by mouth 2 (two) times daily.        fesoterodine (TOVIAZ) 4 mg Tb24 Take 1 tablet (4 mg total) by mouth once daily. 30 tablet 11    fluoxetine (PROZAC) 10 MG capsule Take 10 mg by mouth once daily.        furosemide (LASIX) 20 MG tablet Take 20 mg by mouth 2 (two) times daily.        gabapentin (NEURONTIN) 600 MG tablet Take 1 tablet (600 mg total) by mouth 3 (three) times daily. 90 tablet 2    hydrocodone-acetaminophen 7.5-325mg (NORCO) 7.5-325 mg per tablet Take 1 tablet by mouth every 6 (six) hours as needed for Pain.        meloxicam (MOBIC) 7.5 MG tablet Take 7.5 mg by mouth once daily.         metformin (GLUCOPHAGE) 500 MG tablet Take 250 mg by mouth daily with breakfast.         metoprolol tartrate (LOPRESSOR) 50 MG tablet Take 50 mg by mouth 2 (two) times daily.         multivitamin (ONE DAILY MULTIVITAMIN) per tablet Take 1 tablet by mouth once daily.        potassium chloride 10% (KAYCIEL) 20 mEq/15 mL solution TAKE 15 ML'S BY MOUTH DAILY 473 mL 0    spironolactone (ALDACTONE) 25 MG tablet Take 25 mg by mouth once daily.        UNKNOWN TO PATIENT Apply 1 application topically once daily.          No current facility-administered medications for this visit.             REVIEW OF SYSTEMS:     GENERAL:  No weight loss, malaise or fevers.  HEENT:  Negative for frequent or significant headaches.  NECK:  Negative for lumps, goiter, pain and significant neck swelling.  RESPIRATORY:  Negative for cough, wheezing or shortness of breath.  CARDIOVASCULAR:  Negative for chest pain, leg swelling or palpitations.  GI:  Negative for abdominal discomfort, blood in stools or black stools or change in bowel habits.  MUSCULOSKELETAL:  See HPI.  SKIN:  Negative for lesions, rash, and itching.  PSYCH:  + for sleep disturbance, mood disorder and  recent psychosocial stressors.  HEMATOLOGY/LYMPHOLOGY:  Negative for prolonged bleeding, bruising easily or swollen nodes.  NEURO:   No history of headaches, syncope, paralysis, seizures or tremors.  All other reviewed and negative other than HPI.     Past Medical History:       Past Medical History   Diagnosis Date    Acid reflux      Anxiety      Edema      Hypertension      FER on CPAP      Other and unspecified hyperlipidemia      Urinary frequency      Urinary incontinence           Past Surgical History:         Past Surgical History   Procedure Laterality Date    Appendectomy        Hysterectomy           BRETT/BSO    Cholecystectomy        Carpal tunnel release   10/2015       left hand    Upper gastrointestinal endoscopy             Family History:        Family History   Problem Relation Age of Onset    Stroke Father      Diabetes Mother      Stroke Mother      Diabetes Brother      Breast cancer Neg Hx      Colon cancer Neg Hx      Ovarian cancer Neg Hx           Social History:  Social History            Social History    Marital status:        Spouse name: N/A    Number of children: N/A    Years of education: N/A             Social History Main Topics    Smoking status: Never Smoker    Smokeless tobacco: Never Used    Alcohol use No    Drug use: No    Sexual activity: Not Currently       Birth control/ protection: Surgical         Comment:            Other Topics Concern    None      Social History Narrative         OBJECTIVE:          Visit Vitals    /79    Pulse 83    Wt 121.9 kg (268 lb 11.9 oz)    LMP 11/20/1998    BMI 49.15 kg/m2         PHYSICAL EXAMINATION:     General appearance: Well appearing, in no acute distress, alert and oriented x3. Morbidly obese  Psych:  Mood and affect appropriate.  Skin: Skin color, texture, turgor normal, no rashes or lesions, in both upper and lower body.  Head/face:  Atraumatic, normocephalic. No palpable lymph  nodes  Neck: No pain to palpation over the cervical paraspinous muscles. Spurling Negative. No pain with neck flexion, extension, or lateral flexion.   Back: Straight leg raising in the sitting and supine positions is negative to radicular pain. + pain to palpation over the L-spine or costovertebral angles. Normal range of motion with pain reproduction. + Facet Loading Bilaterally. Positive FABERE, Yeoman's and Galensen test on the both side.    Extremities: Peripheral joint ROM is full and pain free without obvious instability or laxity in all four extremities. No deformities, edema, or skin discoloration. Good capillary refill. Bilateral neuropathy in feet and hands.  Musculoskeletal: Shoulder, hip, sacroiliac and knee provocative maneuvers are negative. Bilateral upper and lower extremity strength is normal and symmetric.  No atrophy or tone abnormalities are noted.  Neuro: Bilateral upper and lower extremity coordination and muscle stretch reflexes are physiologic and symmetric.  Plantar response are downgoing. No loss of sensation is noted.  Gait: Normal.     ASSESSMENT: 56 y.o. year old female  with low back  pain, consistent with Diagnosis:     1.  Lumbar facet arthropathy  2.  Spondylosis of the lumbar spine  3.  Chronic pain syndrome               PLAN:   - I have stressed the importance of physical activity and a home exercise plan to help with pain and improve health.  - Counseled patient regarding the importance of activity modification, constant sleeping habits and physical therapy.  -Patient can continue with medications for now since they are providing benefits. Pt is using the appropriately without side effects.   -scheduled for bilateral lumbar L3, 4, 5  #1 of 2   -previous imaging reviewed and discussed with patient today   -RTC to clinic in 2-3 weeks with pain diary  -The above plan and management options were discussed at length with patient. Patient is in agreement with the above and verbalized  understanding. Dr. Keen  was consulted on this patient  and agrees with this plan.  -More than 25 minutes spent with patient, over 50% of that time was spent in counseling.    NEELAM Conde  Interventional Pain Management    06/21/2019    Disclaimer: This note was partly generated using dictation software which may occasionally result in transcription errors.

## 2019-06-17 NOTE — PROGRESS NOTES
Chronic patient Established Note (Follow up visit)        SUBJECTIVE:    HPI: 6/21/19 Brittany Park presents to the clinic with lumbar pain.  Since the last visit, Brittany Park states the pain has been worsening . Current pain intensity is 8 /10.    Her CC is low back pain, onset years, pain is low back only, she reports no radicular component, pain increases with 5-10  mins  periods of standing, sitting and walking, pain is described as burning and a knife stabbing, aleve mitigates pain at this point. Her pain presents as facet mediate, 2016 MRI shows at L4-5 a minimal broad-based posterior disc bulge and facet arthropathy without central canal stenosis or neural foraminal also has bilateral facet arthropathy L5-S1 without central or neural foraminal.  I will schedule for bilateral lumbar MBB at L3, 4, 5 1 of 2 and RFA if appropriate, discussed pain diary in the importance of bringing his back to clinic on follow-up she states she understood and agreed.     HPI: 11/11/19  Brittany Park presents to the clinic for a follow-up appointment for bilateral foot pain. She continues to have burning at night in her feet which is her worse pain.  She also has numbing in her hands bilaterally, the numbing comes on when she is using her hands.  She has seen  appt Dr. Borges(podiatrist) and was given cream, and Arch Supports, she has seen neurology and her Gabapentin was increased to 600 mg TID. She will f/u with neurology in 3 weeks to see if the increase in gabapentin has helped neuropathic pain. They may consider switching to Cymbalta if results from increase are not positive.     Since the last visit, Brittany Park states the pain has been worsening. Current pain intensity is 5/10.       Pain Disability Index Review:  Last 3 PDI Scores 11/11/2016 6/23/2016   Pain Disability Index (PDI) 42 34         Pain Medications:     - Opioids: None  - Adjuvant Medications: Mobic (Meloxicam), gabapentin and xanax  -  Anti-Coagulants: Aspirin  - Others: n/a                                                                                        Opioid Contract: no      report:  Reviewed and consistent with medication use as prescribed.     Pain Procedures: None     Physical Therapy/Home Exercise: yes (home exercises)     Imaging:   Technique: Sagittal T1, sagittal T2, sagittal STIR, axial T1 and axial T2 images of the lumbar spine were obtained without contrast.    Results: The incidentally visualized soft tissues structures of the abdomen are within normal limits. Vertebral body alignment and heights are maintained. There is disc desiccation in the mid and lower lumbar spine with relative preservation of disc space height. The marrow signal is normal without evidence for a marrow replacement process, infection or tumor. The conus terminates at L1.    At L1-2, no disk herniation, central canal stenosis or neural foraminal narrowing.    At L2-3, no disk herniation, central canal stenosis or neural foraminal narrowing.    At L3-4, no disk herniation, central canal stenosis or neural foraminal narrowing.    At L4-5, there is a minimal broad-based posterior disc bulge and facet arthropathy without central canal stenosis or neural foraminal narrowing.    At L5-S1, there is bilateral facet arthropathy without central canal stenosis or neural foraminal narrowing.   Impression       Very minimal spondylosis of the lower lumbar spine without central canal stenosis or neural foraminal narrowing.  ______________________________________                   Allergies:        Allergies   Allergen Reactions    Morphine Itching    Latex, Natural Rubber Rash         Current Medications:   Current Medications          Current Outpatient Prescriptions   Medication Sig Dispense Refill    alprazolam (XANAX) 0.25 MG tablet Take 0.25 mg by mouth 2 (two) times daily as needed for Anxiety.        aspirin (ECOTRIN) 81 MG EC tablet Take 81 mg by mouth  once daily.        atorvastatin (LIPITOR) 40 MG tablet Take 40 mg by mouth once daily.        CALCIUM CARBONATE/VITAMIN D3 (CALCIUM WITH VITAMIN D ORAL) Take 1 tablet by mouth 2 (two) times daily.        fesoterodine (TOVIAZ) 4 mg Tb24 Take 1 tablet (4 mg total) by mouth once daily. 30 tablet 11    fluoxetine (PROZAC) 10 MG capsule Take 10 mg by mouth once daily.        furosemide (LASIX) 20 MG tablet Take 20 mg by mouth 2 (two) times daily.        gabapentin (NEURONTIN) 600 MG tablet Take 1 tablet (600 mg total) by mouth 3 (three) times daily. 90 tablet 2    hydrocodone-acetaminophen 7.5-325mg (NORCO) 7.5-325 mg per tablet Take 1 tablet by mouth every 6 (six) hours as needed for Pain.        meloxicam (MOBIC) 7.5 MG tablet Take 7.5 mg by mouth once daily.         metformin (GLUCOPHAGE) 500 MG tablet Take 250 mg by mouth daily with breakfast.         metoprolol tartrate (LOPRESSOR) 50 MG tablet Take 50 mg by mouth 2 (two) times daily.         multivitamin (ONE DAILY MULTIVITAMIN) per tablet Take 1 tablet by mouth once daily.        potassium chloride 10% (KAYCIEL) 20 mEq/15 mL solution TAKE 15 ML'S BY MOUTH DAILY 473 mL 0    spironolactone (ALDACTONE) 25 MG tablet Take 25 mg by mouth once daily.        UNKNOWN TO PATIENT Apply 1 application topically once daily.          No current facility-administered medications for this visit.             REVIEW OF SYSTEMS:     GENERAL:  No weight loss, malaise or fevers.  HEENT:  Negative for frequent or significant headaches.  NECK:  Negative for lumps, goiter, pain and significant neck swelling.  RESPIRATORY:  Negative for cough, wheezing or shortness of breath.  CARDIOVASCULAR:  Negative for chest pain, leg swelling or palpitations.  GI:  Negative for abdominal discomfort, blood in stools or black stools or change in bowel habits.  MUSCULOSKELETAL:  See HPI.  SKIN:  Negative for lesions, rash, and itching.  PSYCH:  + for sleep disturbance, mood disorder and  recent psychosocial stressors.  HEMATOLOGY/LYMPHOLOGY:  Negative for prolonged bleeding, bruising easily or swollen nodes.  NEURO:   No history of headaches, syncope, paralysis, seizures or tremors.  All other reviewed and negative other than HPI.     Past Medical History:       Past Medical History   Diagnosis Date    Acid reflux      Anxiety      Edema      Hypertension      FER on CPAP      Other and unspecified hyperlipidemia      Urinary frequency      Urinary incontinence           Past Surgical History:         Past Surgical History   Procedure Laterality Date    Appendectomy        Hysterectomy           BRETT/BSO    Cholecystectomy        Carpal tunnel release   10/2015       left hand    Upper gastrointestinal endoscopy             Family History:        Family History   Problem Relation Age of Onset    Stroke Father      Diabetes Mother      Stroke Mother      Diabetes Brother      Breast cancer Neg Hx      Colon cancer Neg Hx      Ovarian cancer Neg Hx           Social History:  Social History            Social History    Marital status:        Spouse name: N/A    Number of children: N/A    Years of education: N/A             Social History Main Topics    Smoking status: Never Smoker    Smokeless tobacco: Never Used    Alcohol use No    Drug use: No    Sexual activity: Not Currently       Birth control/ protection: Surgical         Comment:            Other Topics Concern    None      Social History Narrative         OBJECTIVE:          Visit Vitals    /79    Pulse 83    Wt 121.9 kg (268 lb 11.9 oz)    LMP 11/20/1998    BMI 49.15 kg/m2         PHYSICAL EXAMINATION:     General appearance: Well appearing, in no acute distress, alert and oriented x3. Morbidly obese  Psych:  Mood and affect appropriate.  Skin: Skin color, texture, turgor normal, no rashes or lesions, in both upper and lower body.  Head/face:  Atraumatic, normocephalic. No palpable lymph  nodes  Neck: No pain to palpation over the cervical paraspinous muscles. Spurling Negative. No pain with neck flexion, extension, or lateral flexion.   Back: Straight leg raising in the sitting and supine positions is negative to radicular pain. + pain to palpation over the L-spine or costovertebral angles. Normal range of motion with pain reproduction. + Facet Loading Bilaterally. Positive FABERE, Yeoman's and Galensen test on the both side.    Extremities: Peripheral joint ROM is full and pain free without obvious instability or laxity in all four extremities. No deformities, edema, or skin discoloration. Good capillary refill. Bilateral neuropathy in feet and hands.  Musculoskeletal: Shoulder, hip, sacroiliac and knee provocative maneuvers are negative. Bilateral upper and lower extremity strength is normal and symmetric.  No atrophy or tone abnormalities are noted.  Neuro: Bilateral upper and lower extremity coordination and muscle stretch reflexes are physiologic and symmetric.  Plantar response are downgoing. No loss of sensation is noted.  Gait: Normal.     ASSESSMENT: 56 y.o. year old female  with low back  pain, consistent with Diagnosis:     1.  Lumbar facet arthropathy  2.  Spondylosis of the lumbar spine  3.  Chronic pain syndrome               PLAN:   - I have stressed the importance of physical activity and a home exercise plan to help with pain and improve health.  - Counseled patient regarding the importance of activity modification, constant sleeping habits and physical therapy.  -Patient can continue with medications for now since they are providing benefits. Pt is using the appropriately without side effects.   -scheduled for bilateral lumbar L3, 4, 5  #1 of 2   -previous imaging reviewed and discussed with patient today   -RTC to clinic in 2-3 weeks with pain diary  -The above plan and management options were discussed at length with patient. Patient is in agreement with the above and verbalized  understanding. Dr. Keen  was consulted on this patient  and agrees with this plan.  -More than 25 minutes spent with patient, over 50% of that time was spent in counseling.    NEELAM Conde  Interventional Pain Management    06/21/2019    Disclaimer: This note was partly generated using dictation software which may occasionally result in transcription errors.

## 2019-06-17 NOTE — H&P (VIEW-ONLY)
Chronic patient Established Note (Follow up visit)        SUBJECTIVE:    HPI: 6/21/19 Brittany Park presents to the clinic with lumbar pain.  Since the last visit, Brittany Park states the pain has been worsening . Current pain intensity is 8 /10.    Her CC is low back pain, onset years, pain is low back only, she reports no radicular component, pain increases with 5-10  mins  periods of standing, sitting and walking, pain is described as burning and a knife stabbing, aleve mitigates pain at this point. Her pain presents as facet mediate, 2016 MRI shows at L4-5 a minimal broad-based posterior disc bulge and facet arthropathy without central canal stenosis or neural foraminal also has bilateral facet arthropathy L5-S1 without central or neural foraminal.  I will schedule for bilateral lumbar MBB at L3, 4, 5 1 of 2 and RFA if appropriate, discussed pain diary in the importance of bringing his back to clinic on follow-up she states she understood and agreed.     HPI: 11/11/19  Brittany Park presents to the clinic for a follow-up appointment for bilateral foot pain. She continues to have burning at night in her feet which is her worse pain.  She also has numbing in her hands bilaterally, the numbing comes on when she is using her hands.  She has seen  appt Dr. Borges(podiatrist) and was given cream, and Arch Supports, she has seen neurology and her Gabapentin was increased to 600 mg TID. She will f/u with neurology in 3 weeks to see if the increase in gabapentin has helped neuropathic pain. They may consider switching to Cymbalta if results from increase are not positive.     Since the last visit, Brittayn Park states the pain has been worsening. Current pain intensity is 5/10.       Pain Disability Index Review:  Last 3 PDI Scores 11/11/2016 6/23/2016   Pain Disability Index (PDI) 42 34         Pain Medications:     - Opioids: None  - Adjuvant Medications: Mobic (Meloxicam), gabapentin and xanax  -  Anti-Coagulants: Aspirin  - Others: n/a                                                                                        Opioid Contract: no      report:  Reviewed and consistent with medication use as prescribed.     Pain Procedures: None     Physical Therapy/Home Exercise: yes (home exercises)     Imaging:   Technique: Sagittal T1, sagittal T2, sagittal STIR, axial T1 and axial T2 images of the lumbar spine were obtained without contrast.    Results: The incidentally visualized soft tissues structures of the abdomen are within normal limits. Vertebral body alignment and heights are maintained. There is disc desiccation in the mid and lower lumbar spine with relative preservation of disc space height. The marrow signal is normal without evidence for a marrow replacement process, infection or tumor. The conus terminates at L1.    At L1-2, no disk herniation, central canal stenosis or neural foraminal narrowing.    At L2-3, no disk herniation, central canal stenosis or neural foraminal narrowing.    At L3-4, no disk herniation, central canal stenosis or neural foraminal narrowing.    At L4-5, there is a minimal broad-based posterior disc bulge and facet arthropathy without central canal stenosis or neural foraminal narrowing.    At L5-S1, there is bilateral facet arthropathy without central canal stenosis or neural foraminal narrowing.   Impression       Very minimal spondylosis of the lower lumbar spine without central canal stenosis or neural foraminal narrowing.  ______________________________________                   Allergies:        Allergies   Allergen Reactions    Morphine Itching    Latex, Natural Rubber Rash         Current Medications:   Current Medications          Current Outpatient Prescriptions   Medication Sig Dispense Refill    alprazolam (XANAX) 0.25 MG tablet Take 0.25 mg by mouth 2 (two) times daily as needed for Anxiety.        aspirin (ECOTRIN) 81 MG EC tablet Take 81 mg by mouth  once daily.        atorvastatin (LIPITOR) 40 MG tablet Take 40 mg by mouth once daily.        CALCIUM CARBONATE/VITAMIN D3 (CALCIUM WITH VITAMIN D ORAL) Take 1 tablet by mouth 2 (two) times daily.        fesoterodine (TOVIAZ) 4 mg Tb24 Take 1 tablet (4 mg total) by mouth once daily. 30 tablet 11    fluoxetine (PROZAC) 10 MG capsule Take 10 mg by mouth once daily.        furosemide (LASIX) 20 MG tablet Take 20 mg by mouth 2 (two) times daily.        gabapentin (NEURONTIN) 600 MG tablet Take 1 tablet (600 mg total) by mouth 3 (three) times daily. 90 tablet 2    hydrocodone-acetaminophen 7.5-325mg (NORCO) 7.5-325 mg per tablet Take 1 tablet by mouth every 6 (six) hours as needed for Pain.        meloxicam (MOBIC) 7.5 MG tablet Take 7.5 mg by mouth once daily.         metformin (GLUCOPHAGE) 500 MG tablet Take 250 mg by mouth daily with breakfast.         metoprolol tartrate (LOPRESSOR) 50 MG tablet Take 50 mg by mouth 2 (two) times daily.         multivitamin (ONE DAILY MULTIVITAMIN) per tablet Take 1 tablet by mouth once daily.        potassium chloride 10% (KAYCIEL) 20 mEq/15 mL solution TAKE 15 ML'S BY MOUTH DAILY 473 mL 0    spironolactone (ALDACTONE) 25 MG tablet Take 25 mg by mouth once daily.        UNKNOWN TO PATIENT Apply 1 application topically once daily.          No current facility-administered medications for this visit.             REVIEW OF SYSTEMS:     GENERAL:  No weight loss, malaise or fevers.  HEENT:  Negative for frequent or significant headaches.  NECK:  Negative for lumps, goiter, pain and significant neck swelling.  RESPIRATORY:  Negative for cough, wheezing or shortness of breath.  CARDIOVASCULAR:  Negative for chest pain, leg swelling or palpitations.  GI:  Negative for abdominal discomfort, blood in stools or black stools or change in bowel habits.  MUSCULOSKELETAL:  See HPI.  SKIN:  Negative for lesions, rash, and itching.  PSYCH:  + for sleep disturbance, mood disorder and  recent psychosocial stressors.  HEMATOLOGY/LYMPHOLOGY:  Negative for prolonged bleeding, bruising easily or swollen nodes.  NEURO:   No history of headaches, syncope, paralysis, seizures or tremors.  All other reviewed and negative other than HPI.     Past Medical History:       Past Medical History   Diagnosis Date    Acid reflux      Anxiety      Edema      Hypertension      FER on CPAP      Other and unspecified hyperlipidemia      Urinary frequency      Urinary incontinence           Past Surgical History:         Past Surgical History   Procedure Laterality Date    Appendectomy        Hysterectomy           BRETT/BSO    Cholecystectomy        Carpal tunnel release   10/2015       left hand    Upper gastrointestinal endoscopy             Family History:        Family History   Problem Relation Age of Onset    Stroke Father      Diabetes Mother      Stroke Mother      Diabetes Brother      Breast cancer Neg Hx      Colon cancer Neg Hx      Ovarian cancer Neg Hx           Social History:  Social History            Social History    Marital status:        Spouse name: N/A    Number of children: N/A    Years of education: N/A             Social History Main Topics    Smoking status: Never Smoker    Smokeless tobacco: Never Used    Alcohol use No    Drug use: No    Sexual activity: Not Currently       Birth control/ protection: Surgical         Comment:            Other Topics Concern    None      Social History Narrative         OBJECTIVE:          Visit Vitals    /79    Pulse 83    Wt 121.9 kg (268 lb 11.9 oz)    LMP 11/20/1998    BMI 49.15 kg/m2         PHYSICAL EXAMINATION:     General appearance: Well appearing, in no acute distress, alert and oriented x3. Morbidly obese  Psych:  Mood and affect appropriate.  Skin: Skin color, texture, turgor normal, no rashes or lesions, in both upper and lower body.  Head/face:  Atraumatic, normocephalic. No palpable lymph  nodes  Neck: No pain to palpation over the cervical paraspinous muscles. Spurling Negative. No pain with neck flexion, extension, or lateral flexion.   Back: Straight leg raising in the sitting and supine positions is negative to radicular pain. + pain to palpation over the L-spine or costovertebral angles. Normal range of motion with pain reproduction. + Facet Loading Bilaterally. Positive FABERE, Yeoman's and Galensen test on the both side.    Extremities: Peripheral joint ROM is full and pain free without obvious instability or laxity in all four extremities. No deformities, edema, or skin discoloration. Good capillary refill. Bilateral neuropathy in feet and hands.  Musculoskeletal: Shoulder, hip, sacroiliac and knee provocative maneuvers are negative. Bilateral upper and lower extremity strength is normal and symmetric.  No atrophy or tone abnormalities are noted.  Neuro: Bilateral upper and lower extremity coordination and muscle stretch reflexes are physiologic and symmetric.  Plantar response are downgoing. No loss of sensation is noted.  Gait: Normal.     ASSESSMENT: 56 y.o. year old female  with low back  pain, consistent with Diagnosis:     1.  Lumbar facet arthropathy  2.  Spondylosis of the lumbar spine  3.  Chronic pain syndrome               PLAN:   - I have stressed the importance of physical activity and a home exercise plan to help with pain and improve health.  - Counseled patient regarding the importance of activity modification, constant sleeping habits and physical therapy.  -Patient can continue with medications for now since they are providing benefits. Pt is using the appropriately without side effects.   -scheduled for bilateral lumbar L3, 4, 5  #1 of 2   -previous imaging reviewed and discussed with patient today   -RTC to clinic in 2-3 weeks with pain diary  -The above plan and management options were discussed at length with patient. Patient is in agreement with the above and verbalized  understanding. Dr. Keen  was consulted on this patient  and agrees with this plan.  -More than 25 minutes spent with patient, over 50% of that time was spent in counseling.    NEELAM Conde  Interventional Pain Management    06/21/2019    Disclaimer: This note was partly generated using dictation software which may occasionally result in transcription errors.

## 2019-06-21 ENCOUNTER — OFFICE VISIT (OUTPATIENT)
Dept: PAIN MEDICINE | Facility: CLINIC | Age: 59
End: 2019-06-21
Payer: COMMERCIAL

## 2019-06-21 VITALS
SYSTOLIC BLOOD PRESSURE: 153 MMHG | BODY MASS INDEX: 50.26 KG/M2 | DIASTOLIC BLOOD PRESSURE: 67 MMHG | RESPIRATION RATE: 14 BRPM | HEART RATE: 61 BPM | WEIGHT: 273.13 LBS | HEIGHT: 62 IN

## 2019-06-21 DIAGNOSIS — G89.4 CHRONIC PAIN SYNDROME: ICD-10-CM

## 2019-06-21 DIAGNOSIS — M47.816 FACET ARTHROPATHY, LUMBAR: Primary | ICD-10-CM

## 2019-06-21 DIAGNOSIS — M43.16 SPONDYLOLISTHESIS OF LUMBAR REGION: ICD-10-CM

## 2019-06-21 DIAGNOSIS — M51.36 DDD (DEGENERATIVE DISC DISEASE), LUMBAR: ICD-10-CM

## 2019-06-21 PROCEDURE — 99214 PR OFFICE/OUTPT VISIT, EST, LEVL IV, 30-39 MIN: ICD-10-PCS | Mod: S$GLB,,, | Performed by: NURSE PRACTITIONER

## 2019-06-21 PROCEDURE — 3008F PR BODY MASS INDEX (BMI) DOCUMENTED: ICD-10-PCS | Mod: CPTII,S$GLB,, | Performed by: NURSE PRACTITIONER

## 2019-06-21 PROCEDURE — 3077F SYST BP >= 140 MM HG: CPT | Mod: CPTII,S$GLB,, | Performed by: NURSE PRACTITIONER

## 2019-06-21 PROCEDURE — 99214 OFFICE O/P EST MOD 30 MIN: CPT | Mod: S$GLB,,, | Performed by: NURSE PRACTITIONER

## 2019-06-21 PROCEDURE — 99999 PR PBB SHADOW E&M-EST. PATIENT-LVL IV: CPT | Mod: PBBFAC,,, | Performed by: NURSE PRACTITIONER

## 2019-06-21 PROCEDURE — 3078F PR MOST RECENT DIASTOLIC BLOOD PRESSURE < 80 MM HG: ICD-10-PCS | Mod: CPTII,S$GLB,, | Performed by: NURSE PRACTITIONER

## 2019-06-21 PROCEDURE — 3008F BODY MASS INDEX DOCD: CPT | Mod: CPTII,S$GLB,, | Performed by: NURSE PRACTITIONER

## 2019-06-21 PROCEDURE — 99999 PR PBB SHADOW E&M-EST. PATIENT-LVL IV: ICD-10-PCS | Mod: PBBFAC,,, | Performed by: NURSE PRACTITIONER

## 2019-06-21 PROCEDURE — 3077F PR MOST RECENT SYSTOLIC BLOOD PRESSURE >= 140 MM HG: ICD-10-PCS | Mod: CPTII,S$GLB,, | Performed by: NURSE PRACTITIONER

## 2019-06-21 PROCEDURE — 3078F DIAST BP <80 MM HG: CPT | Mod: CPTII,S$GLB,, | Performed by: NURSE PRACTITIONER

## 2019-06-24 ENCOUNTER — TELEPHONE (OUTPATIENT)
Dept: PAIN MEDICINE | Facility: CLINIC | Age: 59
End: 2019-06-24

## 2019-06-24 NOTE — TELEPHONE ENCOUNTER
Reaching out to Mrs. Soto to inform her we received the medication clearance from Dr. Ellis for her to hold her ASA x3 days prior to procedure scheduled for Friday with Dr. Keen. Patient voiced understanding and will begin holding her ASA staring tomorrow June 25.

## 2019-06-28 ENCOUNTER — HOSPITAL ENCOUNTER (OUTPATIENT)
Dept: RADIOLOGY | Facility: HOSPITAL | Age: 59
Discharge: HOME | End: 2019-06-28
Attending: NURSE PRACTITIONER | Admitting: PAIN MEDICINE
Payer: COMMERCIAL

## 2019-06-28 ENCOUNTER — HOSPITAL ENCOUNTER (OUTPATIENT)
Facility: HOSPITAL | Age: 59
Discharge: HOME | End: 2019-06-28
Attending: PAIN MEDICINE | Admitting: PAIN MEDICINE
Payer: COMMERCIAL

## 2019-06-28 VITALS
OXYGEN SATURATION: 91 % | HEART RATE: 58 BPM | TEMPERATURE: 97 F | RESPIRATION RATE: 18 BRPM | DIASTOLIC BLOOD PRESSURE: 63 MMHG | SYSTOLIC BLOOD PRESSURE: 129 MMHG

## 2019-06-28 DIAGNOSIS — M47.816 FACET ARTHROPATHY, LUMBAR: ICD-10-CM

## 2019-06-28 DIAGNOSIS — M47.816 LUMBAR SPONDYLOSIS: Primary | ICD-10-CM

## 2019-06-28 PROCEDURE — 64493 INJ PARAVERT F JNT L/S 1 LEV: CPT | Mod: 50,,, | Performed by: PAIN MEDICINE

## 2019-06-28 PROCEDURE — 64494 INJ PARAVERT F JNT L/S 2 LEV: CPT | Mod: 50,,, | Performed by: PAIN MEDICINE

## 2019-06-28 PROCEDURE — 64493 INJ PARAVERT F JNT L/S 1 LEV: CPT | Mod: 50 | Performed by: PAIN MEDICINE

## 2019-06-28 PROCEDURE — 64494 PR INJ DX/THER AGNT PARAVERT FACET JOINT,IMG GUIDE,LUMBAR/SAC, 2ND LEVEL: ICD-10-PCS | Mod: 50,,, | Performed by: PAIN MEDICINE

## 2019-06-28 PROCEDURE — 64494 INJ PARAVERT F JNT L/S 2 LEV: CPT | Mod: 50 | Performed by: PAIN MEDICINE

## 2019-06-28 PROCEDURE — 64493 PR INJ DX/THER AGNT PARAVERT FACET JOINT,IMG GUIDE,LUMBAR/SAC,1ST LVL: ICD-10-PCS | Mod: 50,,, | Performed by: PAIN MEDICINE

## 2019-06-28 PROCEDURE — 25000003 PHARM REV CODE 250: Performed by: PAIN MEDICINE

## 2019-06-28 RX ORDER — BUPIVACAINE HYDROCHLORIDE 2.5 MG/ML
INJECTION, SOLUTION EPIDURAL; INFILTRATION; INTRACAUDAL
Status: DISCONTINUED | OUTPATIENT
Start: 2019-06-28 | End: 2019-06-28 | Stop reason: HOSPADM

## 2019-06-28 RX ORDER — LIDOCAINE HYDROCHLORIDE 20 MG/ML
INJECTION, SOLUTION INFILTRATION; PERINEURAL
Status: DISCONTINUED | OUTPATIENT
Start: 2019-06-28 | End: 2019-06-28 | Stop reason: HOSPADM

## 2019-06-28 NOTE — INTERVAL H&P NOTE
The patient has been examined and the H&P has been reviewed:        I concur with the findings and no changes have occurred since H&P was written.        Patient cleared for Anesthesia: Local        Anesthesia/Surgery risks, benefits and alternative options discussed and understood by patient/family.      There are no hospital problems to display for this patient.

## 2019-06-28 NOTE — DISCHARGE SUMMARY
Discharge Diagnosis:Facet arthropathy, lumbar [M47.816]  Condition on Discharge: Stable.  Diet on Discharge: Same as before.  Activity: as per instruction sheet.  Discharge to: Home with a responsible adult.  Follow up: as per Discharge instructions

## 2019-06-28 NOTE — OP NOTE
LUMBAR Medial Branch Block Under Fluoroscopy  Time-out taken to identify patient and procedure side prior to starting the procedure.   I attest that I have reviewed the patient's home medications prior to the procedure and no contraindication have been identified. I  re-evaluated the patient after the patient was positioned for the procedure in the procedure room immediately before the procedural time-out. The vital signs are current and represent the current state of the patient which has not significantly changed since the preprocedure assessment.            Date of Service: 06/28/2019    PCP: Clyde Almeida MD    Referring Physician:                                                           PROCEDURE:  Bilateral  L3, L4 and L5 medial branch block    REASON FOR PROCEDURE: Facet arthropathy, lumbar [M47.816]    PHYSICIAN: Wiliam Keen MD  ASSISTANTS: None    MEDICATIONS INJECTED: Bupivicaine 0.25% 1.5ml at each level      LOCAL ANESTHETIC USED: Xylocaine 2% 3ml each site.    SEDATION MEDICATIONS: None    ESTIMATED BLOOD LOSS:  None.    COMPLICATIONS:  None.    TECHNIQUE: Laying in a prone position, the patient was prepped and draped in the usual sterile fashion using ChloraPrep and fenestrated drape.  The level was determined under fluoroscopic guidance.  Local anesthetic was given by going down to the hub of the 27-gauge 1.25in needle and raising a wheel.  A 22-gauge 3.5inch needle was introduced to the anatomic local of the medial branch at the lateral mass utilizing live fluoroscopy. Medication was then injected slowly at each level as stated above. The patient tolerated the procedure well.       The patient was monitored after the procedure.  Patient was given post procedure and discharge instructions to follow at home.  We will see the patient back in two weeks or the patient may call to inform of status. The patient was discharged in a stable condition

## 2019-06-28 NOTE — DISCHARGE INSTRUCTIONS
DIET: You may resume your normal diet today.    BATHING: You may resume your normal bathing.          You may shower, no hot water directly on site for 24 hours.    DRESSING: You may remove your bandage today.    ACTIVITY LEVEL: You may resume your normal activities 24 hours after your  procedure.    If you have received sedation or an anesthetic, you may feel sleepy                                                                          for several hours. Rest until you are more awake. Gradually resume your normal activities tomorrow.    If you have received sedation or an anesthetic, do not drive or operate heavy machinery for at least 24 hours.    MEDICATION: You may resume your normal medications today.    You will receive instructions for any pain prescriptions. Pain medications should be taken only as directed.    SPECIAL INSTRUCTIONS: No heat to the injection site for 24 hours including: bath or shower, heating pad, moist heat, hot tubs.    Use ice pack to injection site for any pain or discomfort. Apply ice pack to 20 minutes then remove for 20 minutes before re-applying to site.    WHEN TO CALL DOCTOR: Redness or swelling around injection site    Fever of 101F    Drainage (pus) from the injection site    For any continuous bleeding (some dried blood over the incision is normal).    FOLLOW UP: Follow up phone call will be made by office.    FOR EMERGENCIES: If any unusual problems or difficulties occur during clinic hours, call [phone] or [phone].

## 2019-07-01 ENCOUNTER — TELEPHONE (OUTPATIENT)
Dept: PAIN MEDICINE | Facility: CLINIC | Age: 59
End: 2019-07-01

## 2019-07-01 DIAGNOSIS — M47.816 LUMBAR SPONDYLOSIS: Primary | ICD-10-CM

## 2019-07-08 ENCOUNTER — TELEPHONE (OUTPATIENT)
Dept: PAIN MEDICINE | Facility: CLINIC | Age: 59
End: 2019-07-08

## 2019-07-08 NOTE — TELEPHONE ENCOUNTER
Reaching out to patient to inform her we received medication clearance from Dr. Retana to hold ASA x3 days prior to procedure on Friday with Dr. Teixeira. Patient will begin holding ASA Tuesday July 9 until after procedure. Understanding was voiced.

## 2019-07-19 ENCOUNTER — HOSPITAL ENCOUNTER (OUTPATIENT)
Dept: RADIOLOGY | Facility: HOSPITAL | Age: 59
Discharge: HOME | End: 2019-07-19
Attending: PAIN MEDICINE | Admitting: PAIN MEDICINE
Payer: COMMERCIAL

## 2019-07-19 ENCOUNTER — HOSPITAL ENCOUNTER (OUTPATIENT)
Facility: HOSPITAL | Age: 59
Discharge: HOME | End: 2019-07-19
Attending: PAIN MEDICINE | Admitting: PAIN MEDICINE
Payer: COMMERCIAL

## 2019-07-19 VITALS
RESPIRATION RATE: 16 BRPM | TEMPERATURE: 97 F | HEART RATE: 60 BPM | SYSTOLIC BLOOD PRESSURE: 111 MMHG | OXYGEN SATURATION: 94 % | DIASTOLIC BLOOD PRESSURE: 62 MMHG

## 2019-07-19 DIAGNOSIS — M47.816 LUMBAR SPONDYLOSIS: ICD-10-CM

## 2019-07-19 DIAGNOSIS — M47.816 LUMBAR SPONDYLOSIS: Primary | ICD-10-CM

## 2019-07-19 PROCEDURE — 25000003 PHARM REV CODE 250: Performed by: PAIN MEDICINE

## 2019-07-19 PROCEDURE — 64494 INJ PARAVERT F JNT L/S 2 LEV: CPT | Mod: 50 | Performed by: PAIN MEDICINE

## 2019-07-19 PROCEDURE — 64493 PR INJ DX/THER AGNT PARAVERT FACET JOINT,IMG GUIDE,LUMBAR/SAC,1ST LVL: ICD-10-PCS | Mod: 50,,, | Performed by: PAIN MEDICINE

## 2019-07-19 PROCEDURE — 64494 INJ PARAVERT F JNT L/S 2 LEV: CPT | Mod: 50,,, | Performed by: PAIN MEDICINE

## 2019-07-19 PROCEDURE — 64493 INJ PARAVERT F JNT L/S 1 LEV: CPT | Mod: 50,,, | Performed by: PAIN MEDICINE

## 2019-07-19 PROCEDURE — 64493 INJ PARAVERT F JNT L/S 1 LEV: CPT | Mod: 50 | Performed by: PAIN MEDICINE

## 2019-07-19 PROCEDURE — 64494 PR INJ DX/THER AGNT PARAVERT FACET JOINT,IMG GUIDE,LUMBAR/SAC, 2ND LEVEL: ICD-10-PCS | Mod: 50,,, | Performed by: PAIN MEDICINE

## 2019-07-19 RX ORDER — LIDOCAINE HYDROCHLORIDE 20 MG/ML
INJECTION, SOLUTION INFILTRATION; PERINEURAL
Status: DISCONTINUED | OUTPATIENT
Start: 2019-07-19 | End: 2019-07-19 | Stop reason: HOSPADM

## 2019-07-19 RX ORDER — BUPIVACAINE HYDROCHLORIDE 2.5 MG/ML
INJECTION, SOLUTION EPIDURAL; INFILTRATION; INTRACAUDAL
Status: DISCONTINUED | OUTPATIENT
Start: 2019-07-19 | End: 2019-07-19 | Stop reason: HOSPADM

## 2019-07-19 NOTE — OP NOTE
LUMBAR Medial Branch Block Under Fluoroscopy  Time-out taken to identify patient and procedure side prior to starting the procedure.   I attest that I have reviewed the patient's home medications prior to the procedure and no contraindication have been identified. I  re-evaluated the patient after the patient was positioned for the procedure in the procedure room immediately before the procedural time-out. The vital signs are current and represent the current state of the patient which has not significantly changed since the preprocedure assessment.            Date of Service: 07/19/2019    PCP: Clyde Almeida MD    Referring Physician:                                                           PROCEDURE:  Bilateral  L3, L4 and L5 medial branch block    REASON FOR PROCEDURE: Lumbar spondylosis [M47.816]    PHYSICIAN: Wiliam Keen MD  ASSISTANTS: None    MEDICATIONS INJECTED: Bupivicaine 0.25% 1.5ml at each level      LOCAL ANESTHETIC USED: Xylocaine 2% 3ml each site.    SEDATION MEDICATIONS: None    ESTIMATED BLOOD LOSS:  None.    COMPLICATIONS:  None.    TECHNIQUE: Laying in a prone position, the patient was prepped and draped in the usual sterile fashion using ChloraPrep and fenestrated drape.  The level was determined under fluoroscopic guidance.  Local anesthetic was given by going down to the hub of the 27-gauge 1.25in needle and raising a wheel.  A 22-gauge 3.5inch needle was introduced to the anatomic local of the medial branch at the lateral mass utilizing live fluoroscopy. Medication was then injected slowly at each level as stated above. The patient tolerated the procedure well.       The patient was monitored after the procedure.  Patient was given post procedure and discharge instructions to follow at home.  We will see the patient back in two weeks or the patient may call to inform of status. The patient was discharged in a stable condition

## 2019-07-19 NOTE — INTERVAL H&P NOTE
The patient has been examined and the H&P has been reviewed:        I concur with the findings and no changes have occurred since H&P was written.        Patient cleared for Anesthesia: Local        Anesthesia/Surgery risks, benefits and alternative options discussed and understood by patient/family.      Active Hospital Problems    Diagnosis  POA    Lumbar spondylosis [M47.816]  Yes      Resolved Hospital Problems   No resolved problems to display.

## 2019-07-22 ENCOUNTER — TELEPHONE (OUTPATIENT)
Dept: PAIN MEDICINE | Facility: CLINIC | Age: 59
End: 2019-07-22

## 2019-07-22 ENCOUNTER — TELEPHONE (OUTPATIENT)
Dept: ORTHOPEDICS | Facility: CLINIC | Age: 59
End: 2019-07-22

## 2019-07-22 NOTE — TELEPHONE ENCOUNTER
Just FYI.  Reaching out to patient following procedure, MBB L3, L4, L5, Friday July 19, 2019 with Dr. Keen. Patient states that she has had no relief from this MBB she did have little relief from the first one.

## 2019-07-23 ENCOUNTER — OFFICE VISIT (OUTPATIENT)
Dept: ORTHOPEDICS | Facility: CLINIC | Age: 59
End: 2019-07-23
Payer: COMMERCIAL

## 2019-07-23 VITALS
HEART RATE: 58 BPM | SYSTOLIC BLOOD PRESSURE: 119 MMHG | DIASTOLIC BLOOD PRESSURE: 64 MMHG | BODY MASS INDEX: 50.24 KG/M2 | HEIGHT: 62 IN | WEIGHT: 273 LBS

## 2019-07-23 DIAGNOSIS — M19.049 CMC ARTHRITIS: Primary | ICD-10-CM

## 2019-07-23 PROCEDURE — 99999 PR PBB SHADOW E&M-EST. PATIENT-LVL V: CPT | Mod: PBBFAC,,, | Performed by: PHYSICIAN ASSISTANT

## 2019-07-23 PROCEDURE — 3008F PR BODY MASS INDEX (BMI) DOCUMENTED: ICD-10-PCS | Mod: CPTII,S$GLB,, | Performed by: PHYSICIAN ASSISTANT

## 2019-07-23 PROCEDURE — 3074F PR MOST RECENT SYSTOLIC BLOOD PRESSURE < 130 MM HG: ICD-10-PCS | Mod: CPTII,S$GLB,, | Performed by: PHYSICIAN ASSISTANT

## 2019-07-23 PROCEDURE — 3078F DIAST BP <80 MM HG: CPT | Mod: CPTII,S$GLB,, | Performed by: PHYSICIAN ASSISTANT

## 2019-07-23 PROCEDURE — 3078F PR MOST RECENT DIASTOLIC BLOOD PRESSURE < 80 MM HG: ICD-10-PCS | Mod: CPTII,S$GLB,, | Performed by: PHYSICIAN ASSISTANT

## 2019-07-23 PROCEDURE — 3008F BODY MASS INDEX DOCD: CPT | Mod: CPTII,S$GLB,, | Performed by: PHYSICIAN ASSISTANT

## 2019-07-23 PROCEDURE — 99213 PR OFFICE/OUTPT VISIT, EST, LEVL III, 20-29 MIN: ICD-10-PCS | Mod: S$GLB,,, | Performed by: PHYSICIAN ASSISTANT

## 2019-07-23 PROCEDURE — 99999 PR PBB SHADOW E&M-EST. PATIENT-LVL V: ICD-10-PCS | Mod: PBBFAC,,, | Performed by: PHYSICIAN ASSISTANT

## 2019-07-23 PROCEDURE — 3074F SYST BP LT 130 MM HG: CPT | Mod: CPTII,S$GLB,, | Performed by: PHYSICIAN ASSISTANT

## 2019-07-23 PROCEDURE — 99213 OFFICE O/P EST LOW 20 MIN: CPT | Mod: S$GLB,,, | Performed by: PHYSICIAN ASSISTANT

## 2019-07-23 NOTE — PROGRESS NOTES
Subjective:      Patient ID: Brittany Park is a 59 y.o. female.    Chief Complaint: Pain of the Right Hand      HPI  Brittany Park is a right hand dominant 59 y.o. female presenting today for follow up of right thumb/hand pain.  She reports that she continues to have pain in the right thumb that extends toward the forearm radiating and base of the right thumb.  She reports that pain is increased with gripping and holding items with weight, such as her water bottle.  She also has increased pain with opening jars. She reports improvement in her pain with use of paraffin.  She has not tried bracing, but she does use a compression glove.  She reports that compound cream has not been helpful.  She denies any triggering of the right thumb. She denies any finger numbness or tingling.      Review of patient's allergies indicates:   Allergen Reactions    Morphine Itching    Latex, natural rubber Rash         Current Outpatient Medications   Medication Sig Dispense Refill    alprazolam (XANAX) 0.25 MG tablet Take 0.25 mg by mouth 2 (two) times daily as needed for Anxiety.      ascorbic acid, vitamin C, (VITAMIN C) 1000 MG tablet Take 1,000 mg by mouth once daily.       aspirin (ECOTRIN) 81 MG EC tablet Take 81 mg by mouth once daily.      atorvastatin (LIPITOR) 40 MG tablet Take 40 mg by mouth once daily.      CALCIUM CARBONATE/VITAMIN D3 (CALCIUM WITH VITAMIN D ORAL) Take 1 tablet by mouth 2 (two) times daily.      cetirizine (ZYRTEC) 10 MG tablet Take 10 mg by mouth once daily.      fish oil-omega-3 fatty acids 300-1,000 mg capsule Take by mouth once daily.      furosemide (LASIX) 20 MG tablet Take 40 mg by mouth 2 (two) times daily.       gabapentin (NEURONTIN) 800 MG tablet Take 1 tablet (800 mg total) by mouth 3 (three) times daily. 90 tablet 11    meloxicam (MOBIC) 7.5 MG tablet Take 7.5 mg by mouth once daily.       metformin (GLUCOPHAGE) 500 MG tablet Take 250 mg by mouth daily with breakfast.        metoprolol tartrate (LOPRESSOR) 50 MG tablet Take 50 mg by mouth 2 (two) times daily.       multivitamin (ONE DAILY MULTIVITAMIN) per tablet Take 1 tablet by mouth once daily.      pantoprazole (PROTONIX) 40 MG tablet Take 40 mg by mouth once daily.       spironolactone (ALDACTONE) 25 MG tablet Take 25 mg by mouth once daily.      UNKNOWN TO PATIENT Apply 1 application topically once daily. Cyclobenzaprine/Lidocaine      DULoxetine (CYMBALTA) 30 MG capsule Take 2 capsules (60 mg total) by mouth once daily. (Patient taking differently: Take 30 mg by mouth 2 (two) times daily. ) 60 capsule 11     No current facility-administered medications for this visit.        Past Medical History:   Diagnosis Date    Acid reflux     Anxiety     Edema     General anesthetics causing adverse effect in therapeutic use     slow to awaken    Hypertension     FER on CPAP     Other and unspecified hyperlipidemia     Prediabetes     Urinary incontinence        Past Surgical History:   Procedure Laterality Date    ADENOIDECTOMY      APPENDECTOMY      CARPAL TUNNEL RELEASE Bilateral     right 20 years ago; left 10/2015    CHOLECYSTECTOMY      COLONOSCOPY      CYSTOSCOPY N/A 1/8/2019    Performed [facility] at [facility] OR 1ST FLR    CYSTOSCOPY, WITH PERIURETHRAL BULKING AGENT INJECTION MACROPLASTIQUE N/A 1/8/2019    Performed by [facility] at [facility] OR 1ST FLR    HYSTERECTOMY      BRETT/BSO    LUMBAR MEDIAL BRANCH NERVE BLOCK (L3,4,5) Bilateral 7/19/2019    Per[facility] by [facility]ili[facility]en Jr[facility] at [facility] OR    LUMBAR MEDIAL BRANCH NERVE BLOCK (L3,4,5) Bilateral 6/28/2019    Per[facility] at [facility] OR    OOPHORECTOMY      RELEASE, TRIGGER FINGER - RIGHT RING FINGER Right 6/25/2018    Performed by Violette Broussard [facility] at [facility] [place] by [facility] OR    RELEASE-NERVE-ULNAR - LEFT ELBOW Left 5/3/2017    Performed by Violette [facility]ard [facility] at [facility] OR 2ND FLR    TONSILLECTOMY      ULNAR  "NERVE REPAIR      left side    ulner nerve      left side    UPPER GASTROINTESTINAL ENDOSCOPY           Review of Systems:  Review of Systems   Constitution: Negative for chills and fever.   Skin: Negative for rash and suspicious lesions.   Musculoskeletal:        See HPI   Neurological: Negative for dizziness, headaches, light-headedness, numbness and paresthesias.   Psychiatric/Behavioral: Negative for depression. The patient is not nervous/anxious.          OBJECTIVE:     PHYSICAL EXAM:  Height: 5' 2" (157.5 cm) Weight: 123.8 kg (273 lb)  Vitals:    07/23/19 1141   BP: 119/64   Pulse: (!) 58   Weight: 123.8 kg (273 lb)   Height: 5' 2" (1.575 m)   PainSc: 10-Worst pain ever     General    Vitals reviewed.  Constitutional: She is oriented to person, place, and time. She appears well-developed and well-nourished.   HENT:   Head: Normocephalic and atraumatic.   Neck: Normal range of motion.   Cardiovascular: Normal rate.    Pulmonary/Chest: Effort normal. No respiratory distress.   Neurological: She is alert and oriented to person, place, and time.   Psychiatric: She has a normal mood and affect. Her behavior is normal. Judgment and thought content normal.             Musculoskeletal:  Well-healed surgical scars in the palm from prior trigger finger releases.  No edema appreciated.  Tender to palpation at the right radial styloid and the right CMC joint, also tender at the right thumb A1 pulley.  Most discretely tender at the CMC joint, positive grind test on the right.  Nontender at the left CMC, negative grind on the left.  Good finger and wrist range of motion.  Negative Finkelstein's on the right, negative WHAT test. No triggering noted on exam.  Neurovascularly intact-good sensation and motor function, good capillary refill, 2+ radial pulses.    RADIOGRAPHS:  Right Hand XRay, 5/13/19  FINDINGS:  Mild degenerative changes of the distal interphalangeal joints and 1st carpometacarpal joints.  No evidence of acute " fracture, dislocation or bone destruction.  Alignment is satisfactory.  No abnormal foreign body.      Impression       Mild degenerative changes.  No acute bony abnormalities.       Comments: I have personally reviewed the imaging and I agree with the above radiologist's report.    ASSESSMENT/PLAN:   Brittany was seen today for pain.    Diagnoses and all orders for this visit:    CMC arthritis        - Again discussed conservative and surgical treatment options for CMC arthritis. Discussed compound cream, bracing, paraffin, therapy, steroid injection, and surgery.  Not interested in steroid injection today.  - discussed use of compound cream  - CMC comfort cool brace  - Orders for OT  - Call with questions or concerns  - Follow up in 6 weeks if not improved    Disclaimer: This note has been generated using voice-recognition software. There may be typographical errors that have been missed during proof-reading.

## 2019-08-06 NOTE — TELEPHONE ENCOUNTER
#2 MBB scheduled. Will reach out for Medication clearance to hold ASA.   
Reaching out to Mrs. Park following her procedure with Dr. Keen Friday June 28, MBB L3, L4, L5. Patient states she had 100% relief Friday until 7 pm and then her pain returned and spread upward in her back not just around her waist line. She does state that she feels about 50% relief at this time. She is keeping up with her pain diary and she resumed her ASA morning of Monday July 1.      The previous clinic notes states to schedule her for 1 of2 MBB and for her to return to clinic with pain diary. Is she to be scheduled for 2nd MBB before returning to clinic?     Please advise.   
Eyes with no visual disturbances.  Ears clean and dry and no hearing difficulties. Nose with pink mucosa and no drainage.  Mouth mucous membranes moist and pink.  No tenderness or swelling to throat or neck.

## 2019-08-16 ENCOUNTER — OFFICE VISIT (OUTPATIENT)
Dept: PAIN MEDICINE | Facility: CLINIC | Age: 59
End: 2019-08-16
Payer: COMMERCIAL

## 2019-08-16 ENCOUNTER — TELEPHONE (OUTPATIENT)
Dept: ORTHOPEDICS | Facility: CLINIC | Age: 59
End: 2019-08-16

## 2019-08-16 VITALS
HEART RATE: 69 BPM | DIASTOLIC BLOOD PRESSURE: 69 MMHG | HEIGHT: 62 IN | SYSTOLIC BLOOD PRESSURE: 126 MMHG | RESPIRATION RATE: 18 BRPM | BODY MASS INDEX: 50.06 KG/M2 | WEIGHT: 272.06 LBS

## 2019-08-16 DIAGNOSIS — M47.816 FACET ARTHROPATHY, LUMBAR: Primary | ICD-10-CM

## 2019-08-16 DIAGNOSIS — M51.36 DDD (DEGENERATIVE DISC DISEASE), LUMBAR: ICD-10-CM

## 2019-08-16 DIAGNOSIS — M43.16 SPONDYLOLISTHESIS OF LUMBAR REGION: ICD-10-CM

## 2019-08-16 DIAGNOSIS — G89.4 CHRONIC PAIN SYNDROME: ICD-10-CM

## 2019-08-16 DIAGNOSIS — M47.816 LUMBAR SPONDYLOSIS: ICD-10-CM

## 2019-08-16 PROCEDURE — 3078F PR MOST RECENT DIASTOLIC BLOOD PRESSURE < 80 MM HG: ICD-10-PCS | Mod: CPTII,S$GLB,, | Performed by: NURSE PRACTITIONER

## 2019-08-16 PROCEDURE — 99999 PR PBB SHADOW E&M-EST. PATIENT-LVL IV: ICD-10-PCS | Mod: PBBFAC,,, | Performed by: NURSE PRACTITIONER

## 2019-08-16 PROCEDURE — 99999 PR PBB SHADOW E&M-EST. PATIENT-LVL IV: CPT | Mod: PBBFAC,,, | Performed by: NURSE PRACTITIONER

## 2019-08-16 PROCEDURE — 3074F PR MOST RECENT SYSTOLIC BLOOD PRESSURE < 130 MM HG: ICD-10-PCS | Mod: CPTII,S$GLB,, | Performed by: NURSE PRACTITIONER

## 2019-08-16 PROCEDURE — 3078F DIAST BP <80 MM HG: CPT | Mod: CPTII,S$GLB,, | Performed by: NURSE PRACTITIONER

## 2019-08-16 PROCEDURE — 99213 OFFICE O/P EST LOW 20 MIN: CPT | Mod: S$GLB,,, | Performed by: NURSE PRACTITIONER

## 2019-08-16 PROCEDURE — 3074F SYST BP LT 130 MM HG: CPT | Mod: CPTII,S$GLB,, | Performed by: NURSE PRACTITIONER

## 2019-08-16 PROCEDURE — 3008F PR BODY MASS INDEX (BMI) DOCUMENTED: ICD-10-PCS | Mod: CPTII,S$GLB,, | Performed by: NURSE PRACTITIONER

## 2019-08-16 PROCEDURE — 99213 PR OFFICE/OUTPT VISIT, EST, LEVL III, 20-29 MIN: ICD-10-PCS | Mod: S$GLB,,, | Performed by: NURSE PRACTITIONER

## 2019-08-16 PROCEDURE — 3008F BODY MASS INDEX DOCD: CPT | Mod: CPTII,S$GLB,, | Performed by: NURSE PRACTITIONER

## 2019-08-16 RX ORDER — NALTREXONE HYDROCHLORIDE AND BUPROPION HYDROCHLORIDE 8; 90 MG/1; MG/1
2 TABLET, EXTENDED RELEASE ORAL 2 TIMES DAILY
Refills: 0 | COMMUNITY
Start: 2019-07-23 | End: 2020-09-22

## 2019-08-16 NOTE — H&P (VIEW-ONLY)
Chronic patient Established Note (Follow up visit)        SUBJECTIVE:    HPI: 8/16/19   Brittany Park presents to the clinic s/p LUMBAR Medial Branch Block L3,4,5 bilaterally   1 of 2 on 7/19/19. Patient states that she has received 95% relief for 12 hours and then pain returned patient states she has had perform more of her ADLs and noticed a discernible difference in her pain level following the lumbar median branch.  Discussed I recommend we repeat the lumbar median branch block bilaterally at L3, 4, 5 # 2 of 2 and RFA if appropriate.  Since the last visit, Brittany Park reports that the pain is consistant.  Current pain intensity 6/10.      HPI: 6/21/19 Brittany Park presents to the clinic with lumbar pain.  Since the last visit, Brittany Park states the pain has been worsening . Current pain intensity is 8 /10.    Her CC is low back pain, onset years, pain is low back only, she reports no radicular component, pain increases with 5-10  mins  periods of standing, sitting and walking, pain is described as burning and a knife stabbing, aleve mitigates pain at this point. Her pain presents as facet mediate, 2016 MRI shows at L4-5 a minimal broad-based posterior disc bulge and facet arthropathy without central canal stenosis or neural foraminal also has bilateral facet arthropathy L5-S1 without central or neural foraminal.  I will schedule for bilateral lumbar MBB at L3, 4, 5 1 of 2 and RFA if appropriate, discussed pain diary in the importance of bringing his back to clinic on follow-up she states she understood and agreed.     HPI: 11/11/19  Brittany Park presents to the clinic for a follow-up appointment for bilateral foot pain. She continues to have burning at night in her feet which is her worse pain.  She also has numbing in her hands bilaterally, the numbing comes on when she is using her hands.  She has seen  appt Dr. Borges(podiatrist) and was given cream, and Arch Supports, she has seen  neurology and her Gabapentin was increased to 600 mg TID. She will f/u with neurology in 3 weeks to see if the increase in gabapentin has helped neuropathic pain. They may consider switching to Cymbalta if results from increase are not positive.     Since the last visit, Brittany Park states the pain has been worsening. Current pain intensity is 5/10.       Pain Disability Index Review:  Last 3 PDI Scores 11/11/2016 6/23/2016   Pain Disability Index (PDI) 42 34         Pain Medications:     - Opioids: None  - Adjuvant Medications: Mobic (Meloxicam), gabapentin and xanax  - Anti-Coagulants: Aspirin  - Others: n/a                                                                                        Opioid Contract: no      report:  Reviewed and consistent with medication use as prescribed.     Pain Procedures:   7/19/19  LUMBAR Medial Branch Block Under Fluoroscopy     Physical Therapy/Home Exercise: yes (home exercises)     Imaging:   Technique: Sagittal T1, sagittal T2, sagittal STIR, axial T1 and axial T2 images of the lumbar spine were obtained without contrast.    Results: The incidentally visualized soft tissues structures of the abdomen are within normal limits. Vertebral body alignment and heights are maintained. There is disc desiccation in the mid and lower lumbar spine with relative preservation of disc space height. The marrow signal is normal without evidence for a marrow replacement process, infection or tumor. The conus terminates at L1.    At L1-2, no disk herniation, central canal stenosis or neural foraminal narrowing.    At L2-3, no disk herniation, central canal stenosis or neural foraminal narrowing.    At L3-4, no disk herniation, central canal stenosis or neural foraminal narrowing.    At L4-5, there is a minimal broad-based posterior disc bulge and facet arthropathy without central canal stenosis or neural foraminal narrowing.    At L5-S1, there is bilateral facet arthropathy without  central canal stenosis or neural foraminal narrowing.   Impression       Very minimal spondylosis of the lower lumbar spine without central canal stenosis or neural foraminal narrowing.  ______________________________________                   Allergies:        Allergies   Allergen Reactions    Morphine Itching    Latex, Natural Rubber Rash         Current Medications:   Current Medications          Current Outpatient Prescriptions   Medication Sig Dispense Refill    alprazolam (XANAX) 0.25 MG tablet Take 0.25 mg by mouth 2 (two) times daily as needed for Anxiety.        aspirin (ECOTRIN) 81 MG EC tablet Take 81 mg by mouth once daily.        atorvastatin (LIPITOR) 40 MG tablet Take 40 mg by mouth once daily.        CALCIUM CARBONATE/VITAMIN D3 (CALCIUM WITH VITAMIN D ORAL) Take 1 tablet by mouth 2 (two) times daily.        fesoterodine (TOVIAZ) 4 mg Tb24 Take 1 tablet (4 mg total) by mouth once daily. 30 tablet 11    fluoxetine (PROZAC) 10 MG capsule Take 10 mg by mouth once daily.        furosemide (LASIX) 20 MG tablet Take 20 mg by mouth 2 (two) times daily.        gabapentin (NEURONTIN) 600 MG tablet Take 1 tablet (600 mg total) by mouth 3 (three) times daily. 90 tablet 2    hydrocodone-acetaminophen 7.5-325mg (NORCO) 7.5-325 mg per tablet Take 1 tablet by mouth every 6 (six) hours as needed for Pain.        meloxicam (MOBIC) 7.5 MG tablet Take 7.5 mg by mouth once daily.         metformin (GLUCOPHAGE) 500 MG tablet Take 250 mg by mouth daily with breakfast.         metoprolol tartrate (LOPRESSOR) 50 MG tablet Take 50 mg by mouth 2 (two) times daily.         multivitamin (ONE DAILY MULTIVITAMIN) per tablet Take 1 tablet by mouth once daily.        potassium chloride 10% (KAYCIEL) 20 mEq/15 mL solution TAKE 15 ML'S BY MOUTH DAILY 473 mL 0    spironolactone (ALDACTONE) 25 MG tablet Take 25 mg by mouth once daily.        UNKNOWN TO PATIENT Apply 1 application topically once daily.          No  current facility-administered medications for this visit.             REVIEW OF SYSTEMS:     GENERAL:  No weight loss, malaise or fevers.  HEENT:  Negative for frequent or significant headaches.  NECK:  Negative for lumps, goiter, pain and significant neck swelling.  RESPIRATORY:  Negative for cough, wheezing or shortness of breath.  CARDIOVASCULAR:  Negative for chest pain, leg swelling or palpitations.  GI:  Negative for abdominal discomfort, blood in stools or black stools or change in bowel habits.  MUSCULOSKELETAL:  See HPI.  SKIN:  Negative for lesions, rash, and itching.  PSYCH:  + for sleep disturbance, mood disorder and recent psychosocial stressors.  HEMATOLOGY/LYMPHOLOGY:  Negative for prolonged bleeding, bruising easily or swollen nodes.  NEURO:   No history of headaches, syncope, paralysis, seizures or tremors.  All other reviewed and negative other than HPI.     Past Medical History:       Past Medical History   Diagnosis Date    Acid reflux      Anxiety      Edema      Hypertension      FER on CPAP      Other and unspecified hyperlipidemia      Urinary frequency      Urinary incontinence           Past Surgical History:         Past Surgical History   Procedure Laterality Date    Appendectomy        Hysterectomy           BRETT/BSO    Cholecystectomy        Carpal tunnel release   10/2015       left hand    Upper gastrointestinal endoscopy             Family History:        Family History   Problem Relation Age of Onset    Stroke Father      Diabetes Mother      Stroke Mother      Diabetes Brother      Breast cancer Neg Hx      Colon cancer Neg Hx      Ovarian cancer Neg Hx           Social History:  Social History            Social History    Marital status:        Spouse name: N/A    Number of children: N/A    Years of education: N/A             Social History Main Topics    Smoking status: Never Smoker    Smokeless tobacco: Never Used    Alcohol use No    Drug use:  No    Sexual activity: Not Currently       Birth control/ protection: Surgical         Comment:            Other Topics Concern    None      Social History Narrative         OBJECTIVE:          Visit Vitals    /79    Pulse 83    Wt 121.9 kg (268 lb 11.9 oz)    LMP 11/20/1998    BMI 49.15 kg/m2         PHYSICAL EXAMINATION:     General appearance: Well appearing, in no acute distress, alert and oriented x3. Morbidly obese  Psych:  Mood and affect appropriate.  Skin: Skin color, texture, turgor normal, no rashes or lesions, in both upper and lower body.  Head/face:  Atraumatic, normocephalic. No palpable lymph nodes  Neck: No pain to palpation over the cervical paraspinous muscles. Spurling Negative. No pain with neck flexion, extension, or lateral flexion.   Back: Straight leg raising in the sitting and supine positions is negative to radicular pain. + pain to palpation over the L-spine or costovertebral angles. Normal range of motion with pain reproduction. + Facet Loading Bilaterally. Positive FABERE, Yeoman's and Galensen test on the both side.    Extremities: Peripheral joint ROM is full and pain free without obvious instability or laxity in all four extremities. No deformities, edema, or skin discoloration. Good capillary refill. Bilateral neuropathy in feet and hands.  Musculoskeletal: Shoulder, hip, sacroiliac and knee provocative maneuvers are negative. Bilateral upper and lower extremity strength is normal and symmetric.  No atrophy or tone abnormalities are noted.  Neuro: Bilateral upper and lower extremity coordination and muscle stretch reflexes are physiologic and symmetric.  Plantar response are downgoing. No loss of sensation is noted.  Gait: Normal.     ASSESSMENT: 56 y.o. year old female  with low back  pain, consistent with Diagnosis:     1.  Lumbar facet arthropathy  2.  Spondylosis of the lumbar spine  3.  Chronic pain syndrome  4.  DDD degenerative disc  disease               PLAN:   - I have stressed the importance of physical activity and a home exercise plan to help with pain and improve health.  - Counseled patient regarding the importance of activity modification, constant sleeping habits and physical therapy.  -Patient can continue with medications for now since they are providing benefits. Pt is using the appropriately without side effects.   -scheduled for bilateral lumbar L3, 4, 5  #2 of 2 and RFA if appropriate  -previous imaging reviewed and discussed with patient today   -RTC to clinic in 2-3 weeks with pain diary  -The above plan and management options were discussed at length with patient. Patient is in agreement with the above and verbalized understanding. Dr. Keen  was consulted on this patient  and agrees with this plan.    Huy Saeed, MAX-C  Interventional Pain Management    08/16/2019    Disclaimer: This note was partly generated using dictation software which may occasionally result in transcription errors.

## 2019-08-20 ENCOUNTER — TELEPHONE (OUTPATIENT)
Dept: PAIN MEDICINE | Facility: CLINIC | Age: 59
End: 2019-08-20

## 2019-08-20 ENCOUNTER — OFFICE VISIT (OUTPATIENT)
Dept: ORTHOPEDICS | Facility: CLINIC | Age: 59
End: 2019-08-20
Payer: COMMERCIAL

## 2019-08-20 VITALS
WEIGHT: 272 LBS | SYSTOLIC BLOOD PRESSURE: 144 MMHG | HEART RATE: 64 BPM | BODY MASS INDEX: 50.05 KG/M2 | DIASTOLIC BLOOD PRESSURE: 85 MMHG | HEIGHT: 62 IN

## 2019-08-20 DIAGNOSIS — M65.4 RADIAL STYLOID TENOSYNOVITIS (DE QUERVAIN): ICD-10-CM

## 2019-08-20 DIAGNOSIS — M18.11 ARTHRITIS OF CARPOMETACARPAL (CMC) JOINT OF RIGHT THUMB: Primary | ICD-10-CM

## 2019-08-20 PROCEDURE — 3077F PR MOST RECENT SYSTOLIC BLOOD PRESSURE >= 140 MM HG: ICD-10-PCS | Mod: CPTII,S$GLB,, | Performed by: PHYSICIAN ASSISTANT

## 2019-08-20 PROCEDURE — 20600 PR DRAIN/INJECT SMALL JOINT/BURSA: ICD-10-PCS | Mod: RT,S$GLB,, | Performed by: PHYSICIAN ASSISTANT

## 2019-08-20 PROCEDURE — 3079F PR MOST RECENT DIASTOLIC BLOOD PRESSURE 80-89 MM HG: ICD-10-PCS | Mod: CPTII,S$GLB,, | Performed by: PHYSICIAN ASSISTANT

## 2019-08-20 PROCEDURE — 3008F PR BODY MASS INDEX (BMI) DOCUMENTED: ICD-10-PCS | Mod: CPTII,S$GLB,, | Performed by: PHYSICIAN ASSISTANT

## 2019-08-20 PROCEDURE — 99999 PR PBB SHADOW E&M-EST. PATIENT-LVL V: CPT | Mod: PBBFAC,,, | Performed by: PHYSICIAN ASSISTANT

## 2019-08-20 PROCEDURE — 3008F BODY MASS INDEX DOCD: CPT | Mod: CPTII,S$GLB,, | Performed by: PHYSICIAN ASSISTANT

## 2019-08-20 PROCEDURE — 3077F SYST BP >= 140 MM HG: CPT | Mod: CPTII,S$GLB,, | Performed by: PHYSICIAN ASSISTANT

## 2019-08-20 PROCEDURE — 99213 OFFICE O/P EST LOW 20 MIN: CPT | Mod: 25,S$GLB,, | Performed by: PHYSICIAN ASSISTANT

## 2019-08-20 PROCEDURE — 3079F DIAST BP 80-89 MM HG: CPT | Mod: CPTII,S$GLB,, | Performed by: PHYSICIAN ASSISTANT

## 2019-08-20 PROCEDURE — 99999 PR PBB SHADOW E&M-EST. PATIENT-LVL V: ICD-10-PCS | Mod: PBBFAC,,, | Performed by: PHYSICIAN ASSISTANT

## 2019-08-20 PROCEDURE — 99213 PR OFFICE/OUTPT VISIT, EST, LEVL III, 20-29 MIN: ICD-10-PCS | Mod: 25,S$GLB,, | Performed by: PHYSICIAN ASSISTANT

## 2019-08-20 PROCEDURE — 20600 DRAIN/INJ JOINT/BURSA W/O US: CPT | Mod: RT,S$GLB,, | Performed by: PHYSICIAN ASSISTANT

## 2019-08-20 RX ORDER — LIDOCAINE HYDROCHLORIDE 10 MG/ML
0.5 INJECTION, SOLUTION EPIDURAL; INFILTRATION; INTRACAUDAL; PERINEURAL
Status: COMPLETED | OUTPATIENT
Start: 2019-08-20 | End: 2019-08-20

## 2019-08-20 RX ORDER — TRIAMCINOLONE ACETONIDE 40 MG/ML
20 INJECTION, SUSPENSION INTRA-ARTICULAR; INTRAMUSCULAR
Status: COMPLETED | OUTPATIENT
Start: 2019-08-20 | End: 2019-08-20

## 2019-08-20 RX ADMIN — TRIAMCINOLONE ACETONIDE 20 MG: 40 INJECTION, SUSPENSION INTRA-ARTICULAR; INTRAMUSCULAR at 02:08

## 2019-08-20 RX ADMIN — LIDOCAINE HYDROCHLORIDE 5 MG: 10 INJECTION, SOLUTION EPIDURAL; INFILTRATION; INTRACAUDAL; PERINEURAL at 02:08

## 2019-08-20 NOTE — PROGRESS NOTES
Subjective:      Patient ID: Brittany Park is a 59 y.o. female.    Chief Complaint: Follow-up of the Right Hand      HPI  Brittany Park is a right hand dominant 59 y.o. female presenting today for follow up of right thumb/hand pain.  She reports that she continues to have pain at the base of the right thumb.  She reports that pain is increased with gripping and holding items with weight, such as her water bottle.  She also has increased pain with opening jars. She reports improvement in her pain with use of paraffin and bracing.  She also uses a compression glove.  She has started using the compound cream regularly at the CMC joint, reports that it has stared to improve her pain.  She denies any triggering of the right thumb. She denies any finger numbness or tingling.      Review of patient's allergies indicates:   Allergen Reactions    Morphine Itching    Latex, natural rubber Rash         Current Outpatient Medications   Medication Sig Dispense Refill    alprazolam (XANAX) 0.25 MG tablet Take 0.25 mg by mouth 2 (two) times daily as needed for Anxiety.      ascorbic acid, vitamin C, (VITAMIN C) 1000 MG tablet Take 1,000 mg by mouth once daily.       aspirin (ECOTRIN) 81 MG EC tablet Take 81 mg by mouth once daily.      atorvastatin (LIPITOR) 40 MG tablet Take 40 mg by mouth once daily.      CALCIUM CARBONATE/VITAMIN D3 (CALCIUM WITH VITAMIN D ORAL) Take 1 tablet by mouth 2 (two) times daily.      cetirizine (ZYRTEC) 10 MG tablet Take 10 mg by mouth once daily.      CONTRAVE 8-90 mg TbSR Take 2 tablets by mouth 2 (two) times daily.  0    fish oil-omega-3 fatty acids 300-1,000 mg capsule Take by mouth once daily.      furosemide (LASIX) 20 MG tablet Take 40 mg by mouth 2 (two) times daily.       gabapentin (NEURONTIN) 800 MG tablet Take 1 tablet (800 mg total) by mouth 3 (three) times daily. 90 tablet 11    meloxicam (MOBIC) 7.5 MG tablet Take 7.5 mg by mouth once daily.       metformin  (GLUCOPHAGE) 500 MG tablet Take 250 mg by mouth daily with breakfast.       metoprolol tartrate (LOPRESSOR) 50 MG tablet Take 50 mg by mouth 2 (two) times daily.       multivitamin (ONE DAILY MULTIVITAMIN) per tablet Take 1 tablet by mouth once daily.      pantoprazole (PROTONIX) 40 MG tablet Take 40 mg by mouth once daily.       spironolactone (ALDACTONE) 25 MG tablet Take 25 mg by mouth once daily.      UNKNOWN TO PATIENT Apply 1 application topically once daily. Cyclobenzaprine/Lidocaine      DULoxetine (CYMBALTA) 30 MG capsule Take 2 capsules (60 mg total) by mouth once daily. (Patient taking differently: Take 30 mg by mouth 2 (two) times daily. ) 60 capsule 11     Current Facility-Administered Medications   Medication Dose Route Frequency Provider Last Rate Last Dose    lidocaine (PF) 10 mg/ml (1%) injection 5 mg  0.5 mL Other 1 time in Clinic/[facility] [place] [place]        triamcinolone acetonide injection 20 mg  20 mg Intra-articular 1 time in Clinic/[facility] [place]           Past Medical History:   Diagnosis Date    Acid reflux     Anxiety     Edema     General anesthetics causing adverse effect in therapeutic use     slow to awaken    Hypertension     FER on CPAP     Other and unspecified hyperlipidemia     Prediabetes     Urinary incontinence        Past Surgical History:   Procedure Laterality Date    ADENOIDECTOMY      APPENDECTOMY      CARPAL TUNNEL RELEASE Bilateral     right 20 years ago; left 10/2015    CHOLECYSTECTOMY      COLONOSCOPY      CYSTOSCOPY N/A 1/8/2019    [facility] OR 1ST FLR    CYSTOSCOPY, WITH PERIURETHRAL BULKING AGENT INJECTION MACROPLASTIQUE N/A 1/8/2019    Performed [facility] at [facility] OR 1ST FLR    HYSTERECTOMY      BRETT/BSO    LUMBAR MEDIAL BRANCH NERVE BLOCK (L3,4,5) Bilateral 7/19/2019    Performed by [facility]iliam Keen Jr., [facility] at [facility] OR    LUMBAR MEDIAL BRANCH NERVE BLOCK (L3,4,5) Bilateral  "6/28/2019    [facility] OR    OOPHORECTOMY      RELEASE, TRIGGER FINGER - RIGHT RING FINGER Right 6/25/2018    [facility] OR    RELEASE-NERVE-ULNAR - LEFT ELBOW Left 5/3/2017    Performed by [facility] at [facility] OR 2ND FLR    TONSILLECTOMY      ULNAR NERVE REPAIR      left side    ulner nerve      left side    UPPER GASTROINTESTINAL ENDOSCOPY           Review of Systems:  Review of Systems   Constitution: Negative for chills and fever.   Skin: Negative for rash and suspicious lesions.   Musculoskeletal:        See HPI   Neurological: Negative for dizziness, headaches, light-headedness, numbness and paresthesias.   Psychiatric/Behavioral: Negative for depression. The patient is not nervous/anxious.          OBJECTIVE:     PHYSICAL EXAM:  Height: 5' 2" (157.5 cm) Weight: 123.4 kg (272 lb)  Vitals:    08/20/19 1311   BP: (!) 144/85   Pulse: 64   Weight: 123.4 kg (272 lb)   Height: 5' 2" (1.575 m)   PainSc:   5     General    Vitals reviewed.  Constitutional: She is oriented to person, place, and time. She appears well-developed and well-nourished.   HENT:   Head: Normocephalic and atraumatic.   Neck: Normal range of motion.   Cardiovascular: Normal rate.    Pulmonary/Chest: Effort normal. No respiratory distress.   Neurological: She is alert and oriented to person, place, and time.   Psychiatric: She has a normal mood and affect. Her behavior is normal. Judgment and thought content normal.             Musculoskeletal:  Well-healed surgical scars in the palm from prior trigger finger releases.  No edema appreciated.  Tender to palpation at the right radial styloid and the right CMC joint.  Most discretely tender at the CMC joint, positive grind test on the right.  Nontender at the left CMC, negative grind on the left.  Good finger and wrist range of motion, pain with right wrist hyperflexion on exam today.  Positive Finkelstein's " and WHAT test on the right today. No triggering noted on exam.  Neurovascularly intact-good sensation and motor function, good capillary refill, 2+ radial pulses.    RADIOGRAPHS:  Right Hand XRay, 5/13/19  FINDINGS:  Mild degenerative changes of the distal interphalangeal joints and 1st carpometacarpal joints.  No evidence of acute fracture, dislocation or bone destruction.  Alignment is satisfactory.  No abnormal foreign body.      Impression       Mild degenerative changes.  No acute bony abnormalities.       Comments: I have personally reviewed the imaging and I agree with the above radiologist's report.    ASSESSMENT/PLAN:   Brittany was seen today for follow-up.    Diagnoses and all orders for this visit:    Arthritis of carpometacarpal (CMC) joint of right thumb  -     Ambulatory Referral to Physical/Occupational Therapy    Radial styloid tenosynovitis (de quervain)  -     Ambulatory Referral to Physical/Occupational Therapy    Other orders  -     triamcinolone acetonide injection 20 mg  -     lidocaine (PF) 10 mg/ml (1%) injection 5 mg        - Again discussed conservative and surgical treatment options for CMC arthritis. Discussed compound cream, bracing, paraffin, therapy, steroid injection, and surgery.  Interested in steroid injection today.  - continue use of compound cream  - continue CMC comfort cool brace  - New orders for OT  - Call with questions or concerns  - Follow up in 3 months    PROCEDURE NOTE:  She has a diagnosis of CMC osteoarthritis. We have discussed surgical and non-surgical options at this point. Risks and benefits of corticosteroid injections discussed in detail. Patient wishes to proceed. After verbal consent and pause for timeout, the base of the thumb was prepped in sterile fashion. The right thumb CMC joint was injected with  20 mg Kenalog and 0.5 cc lidocaine.  The patient tolerated the injection well.       Disclaimer: This note has been generated using voice-recognition software.  There may be typographical errors that have been missed during proof-reading.

## 2019-08-20 NOTE — PATIENT INSTRUCTIONS
What Is Basal Joint Arthritis?  Arthritis is a disease that causes inflammation and stiffness in the joints. It often affects the joint at the base of the thumb, called the basal joint. Basal joint arthritis is most common in women over 40, but anyone can get it. Often it happens in both thumbs.    Causes  Basal joint arthritis occurs as a result of wear and tear on the joint. It is more likely to occur, and at a younger age, if you have fractured or injured your thumb. Repeatedly gripping, twisting, or turning objects with the thumb and fingers may make the arthritis worse.  Inside your thumb  The basal joint is formed by one of the wrist bones and the first of the three bones in the thumb. This joint allows the thumb to move and to pinch with the fingers. When arthritis occurs in the basal joint, it slowly destroys the joint.  Arthritis irritates or destroys the joint  The ends of the bones are covered with cartilage. This covering acts like a cushion, allowing the bones to move smoothly. Arthritis wears away or destroys the cartilage. Then the bones rub against each other when you move your thumb. This causes the joint to become stiff, inflamed, and painful. This makes pinching and grasping with the thumb and fingers painful. With time, the bone in the thumb may collapse. Then you can no longer straighten your thumb.    Symptoms  The most common symptom is pain in the lower part of the thumb. You may feel pain when you lift something with the thumb and fingers, unscrew a jar lid,  an object, or turn a door handle or a key. You may find yourself dropping things. Weather may also make the thumb hurt. The joint may swell, and with time the thumb may become stiff or deformed.   Date Last Reviewed: 9/29/2015 © 2000-2017 [employer]. [address] [place] [address], [place] [address]. All rights reserved. This information is not intended as a substitute for professional medical care. Always follow your  healthcare professional's instructions.        Understanding De Quervain Tenosynovitis    De Quervain tenosynovitis is a condition that can cause wrist and thumb pain. Tendons connect muscles in your wrist and forearm to the bones in your thumb. The tendons have a protective cover (sheath). The sheaths lining makes a fluid that lets the tendons slide easily when you straighten your wrist and thumb. If any of these tendons are irritated or injured, they can become swollen and inflamed. This is called de Quervain tenosynovitis.  How to say it  [id number]   What causes de Quervain tenosynovitis?  This condition is most often caused from overuse. For example, making the same wrist motions over and over can irritate the tendons. This includes doing things like unscrewing jar lids or grasping a tool. Activities such as typing, playing racquet sports, knitting, and texting can also lead to the condition.  Symptoms of de Quervain tenosynovitis  You may have pain, soreness, redness, and swelling along the side of your wrist and the base of your thumb. You may feel pain when you pinch or grasp things, turn or touch your wrist, or make a fist. Your thumb may catch or make a crackling sound when you move it.  Treatment for de Quervain tenosynovitis  Treatments may include:  · Resting the wrist and thumb. This involves limiting movements that make your symptoms worse. You also may need to avoid certain hobbies, sports, and types of work for a time.  · Cold packs. These help reduce pain and swelling.  · Prescription or over-the-counter pain medicines. These help relieve pain and swelling.  · Splint or brace. This helps keep the thumb and wrist from moving and gives time for your tendons to heal.  · Exercises or physical therapy. These help stretch, strengthen, and improve the range of motion in your wrist and thumb.  · Shots of medicine into the area around the tendon. These may help relieve symptoms for a  time.  · Surgery. You may need surgery if other treatments dont relieve symptoms. During surgery, the surgeon releases the sheath that surrounds the tendons so the tendons can move more easily.  Possible complications of de Quervain tenosynovitis  Without proper care and treatment, healing may take longer than normal. Also, symptoms may continue or get worse. Over time, the problem may become long-term (chronic). This can make it hard to use your wrist and thumb for normal activities.  When to call your healthcare provider  Call your healthcare provider right away if you have any of these:  · Fever of 100.4°F (38°C) or higher, or as directed  · Symptoms that dont get better with treatment, or get worse  · Pain, numbness, or coldness in the hand  · New symptoms   Date Last Reviewed: 3/10/2016  © 8329-6541 [employer]. [address] [place] [address], [place] [address]. All rights reserved. This information is not intended as a substitute for professional medical care. Always follow your healthcare professional's instructions.

## 2019-08-20 NOTE — TELEPHONE ENCOUNTER
Medication clearance received from PCP. Patient informed to begin holding ASA today, Tuesday 8/20/19. Understanding voiced.

## 2019-08-23 ENCOUNTER — HOSPITAL ENCOUNTER (OUTPATIENT)
Dept: RADIOLOGY | Facility: HOSPITAL | Age: 59
Discharge: HOME | End: 2019-08-23
Attending: NURSE PRACTITIONER | Admitting: PAIN MEDICINE
Payer: COMMERCIAL

## 2019-08-23 ENCOUNTER — HOSPITAL ENCOUNTER (OUTPATIENT)
Facility: HOSPITAL | Age: 59
Discharge: HOME | End: 2019-08-23
Attending: PAIN MEDICINE | Admitting: PAIN MEDICINE
Payer: COMMERCIAL

## 2019-08-23 VITALS
OXYGEN SATURATION: 96 % | DIASTOLIC BLOOD PRESSURE: 58 MMHG | HEART RATE: 56 BPM | TEMPERATURE: 97 F | RESPIRATION RATE: 17 BRPM | SYSTOLIC BLOOD PRESSURE: 118 MMHG

## 2019-08-23 DIAGNOSIS — M47.816 FACET ARTHROPATHY, LUMBAR: ICD-10-CM

## 2019-08-23 DIAGNOSIS — M47.816 LUMBAR SPONDYLOSIS: Primary | ICD-10-CM

## 2019-08-23 PROCEDURE — 64494 INJ PARAVERT F JNT L/S 2 LEV: CPT | Mod: 50,,, | Performed by: PAIN MEDICINE

## 2019-08-23 PROCEDURE — 25000003 PHARM REV CODE 250: Performed by: PAIN MEDICINE

## 2019-08-23 PROCEDURE — 64494 INJ PARAVERT F JNT L/S 2 LEV: CPT | Mod: 50 | Performed by: PAIN MEDICINE

## 2019-08-23 PROCEDURE — 64493 INJ PARAVERT F JNT L/S 1 LEV: CPT | Mod: 50,,, | Performed by: PAIN MEDICINE

## 2019-08-23 PROCEDURE — 64493 INJ PARAVERT F JNT L/S 1 LEV: CPT | Mod: 50 | Performed by: PAIN MEDICINE

## 2019-08-23 PROCEDURE — 64494 PR INJ DX/THER AGNT PARAVERT FACET JOINT,IMG GUIDE,LUMBAR/SAC, 2ND LEVEL: ICD-10-PCS | Mod: 50,,, | Performed by: PAIN MEDICINE

## 2019-08-23 PROCEDURE — 64493 PR INJ DX/THER AGNT PARAVERT FACET JOINT,IMG GUIDE,LUMBAR/SAC,1ST LVL: ICD-10-PCS | Mod: 50,,, | Performed by: PAIN MEDICINE

## 2019-08-23 RX ORDER — BUPIVACAINE HYDROCHLORIDE 2.5 MG/ML
INJECTION, SOLUTION EPIDURAL; INFILTRATION; INTRACAUDAL
Status: DISCONTINUED | OUTPATIENT
Start: 2019-08-23 | End: 2019-08-23 | Stop reason: HOSPADM

## 2019-08-23 RX ORDER — LIDOCAINE HYDROCHLORIDE 20 MG/ML
INJECTION, SOLUTION INFILTRATION; PERINEURAL
Status: DISCONTINUED | OUTPATIENT
Start: 2019-08-23 | End: 2019-08-23 | Stop reason: HOSPADM

## 2019-08-23 NOTE — OP NOTE
LUMBAR Medial Branch Block Under Fluoroscopy  Time-out taken to identify patient and procedure side prior to starting the procedure.   I attest that I have reviewed the patient's home medications prior to the procedure and no contraindication have been identified. I  re-evaluated the patient after the patient was positioned for the procedure in the procedure room immediately before the procedural time-out. The vital signs are current and represent the current state of the patient which has not significantly changed since the preprocedure assessment.            Date of Service: 08/23/2019    PCP: Clyde Almeida MD    Referring Physician:                                                           PROCEDURE:  Bilateral  L3, L4 and L5 medial branch block    REASON FOR PROCEDURE: Lumbar spondylosis    PHYSICIAN: Wiliam Keen MD  ASSISTANTS: None    MEDICATIONS INJECTED: Bupivicaine 0.25% 1.5ml at each level      LOCAL ANESTHETIC USED: Xylocaine 2%  3ml each site.    SEDATION MEDICATIONS: None    ESTIMATED BLOOD LOSS:  None.    COMPLICATIONS:  None.    TECHNIQUE: Laying in a prone position, the patient was prepped and draped in the usual sterile fashion using ChloraPrep and fenestrated drape.  The level was determined under fluoroscopic guidance.  Local anesthetic was given by going down to the hub of the 27-gauge 1.25in needle and raising a wheel.  A 22-gauge 3.5inch needle was introduced to the anatomic local of the medial branch at the lateral mass utilizing live fluoroscopy. Medication was then injected slowly at each level as stated above. The patient tolerated the procedure well.       The patient was monitored after the procedure.  Patient was given post procedure and discharge instructions to follow at home.  We will see the patient back in two weeks or the patient may call to inform of status. The patient was discharged in a stable condition

## 2019-08-26 ENCOUNTER — TELEPHONE (OUTPATIENT)
Dept: PAIN MEDICINE | Facility: CLINIC | Age: 59
End: 2019-08-26

## 2019-08-26 NOTE — TELEPHONE ENCOUNTER
Reaching out to patient following procedure with Dr. Keen, Friday August 23, 2019. Patient had Bilateral MBB. Patient states that when she left the hospital and arrived home around noon she had a 4x4 area on the left side right above waist line that had a 7 out of 10 pain score. She was very sore. Sunday she woke up and went walk around the shops in [place] and carried bags all day and had no pain. Today she is feeling some pain but may be sore because she has not done these things in a while, she is in 3 out of 10 pain. Patient states she is at 90-95% relief with MBB. Patient will be contacted as soon as Dr. Ross's schedule is released for procedures. Patient will resume taking ASA this evening with daily medications.

## 2019-10-14 ENCOUNTER — TELEPHONE (OUTPATIENT)
Dept: OBSTETRICS AND GYNECOLOGY | Facility: CLINIC | Age: 59
End: 2019-10-14

## 2019-10-14 DIAGNOSIS — Z12.31 ENCOUNTER FOR SCREENING MAMMOGRAM FOR BREAST CANCER: Primary | ICD-10-CM

## 2019-10-14 NOTE — TELEPHONE ENCOUNTER
----- Message from Noreen Ellis MA sent at 10/14/2019  4:07 PM CDT -----  Contact: self      ----- Message -----  From: Racquel Ellis MA  Sent: 10/14/2019   4:06 PM CDT  To: Evan Bishop    Brittany Park  MRN: [id number]  Home Phone      [phone]  Work Phone      Not on file.  Mobile          [phone]    Patient Care Team:  Clyde Almeida MD as PCP - General (Family Medicine)  Caio Gordon MD as Obstetrician (Obstetrics)  OB? No  What phone number can you be reached at?   [phone]  Message:   Please link mammo orders to appt 01/27/20.  Thanks!

## 2019-10-21 ENCOUNTER — OFFICE VISIT (OUTPATIENT)
Dept: UROLOGY | Facility: CLINIC | Age: 59
End: 2019-10-21
Payer: COMMERCIAL

## 2019-10-21 ENCOUNTER — TELEPHONE (OUTPATIENT)
Dept: UROLOGY | Facility: CLINIC | Age: 59
End: 2019-10-21

## 2019-10-21 VITALS
WEIGHT: 280.44 LBS | HEART RATE: 65 BPM | DIASTOLIC BLOOD PRESSURE: 67 MMHG | SYSTOLIC BLOOD PRESSURE: 132 MMHG | HEIGHT: 62 IN | BODY MASS INDEX: 51.61 KG/M2

## 2019-10-21 DIAGNOSIS — N39.3 URINARY, INCONTINENCE, STRESS FEMALE: Primary | ICD-10-CM

## 2019-10-21 PROCEDURE — 51798 US URINE CAPACITY MEASURE: CPT | Mod: S$GLB,,, | Performed by: UROLOGY

## 2019-10-21 PROCEDURE — 99999 PR PBB SHADOW E&M-EST. PATIENT-LVL IV: CPT | Mod: PBBFAC,,, | Performed by: UROLOGY

## 2019-10-21 PROCEDURE — 3075F PR MOST RECENT SYSTOLIC BLOOD PRESS GE 130-139MM HG: ICD-10-PCS | Mod: CPTII,S$GLB,, | Performed by: UROLOGY

## 2019-10-21 PROCEDURE — 81002 URINALYSIS NONAUTO W/O SCOPE: CPT | Mod: S$GLB,,, | Performed by: UROLOGY

## 2019-10-21 PROCEDURE — 99999 PR PBB SHADOW E&M-EST. PATIENT-LVL IV: ICD-10-PCS | Mod: PBBFAC,,, | Performed by: UROLOGY

## 2019-10-21 PROCEDURE — 3078F DIAST BP <80 MM HG: CPT | Mod: CPTII,S$GLB,, | Performed by: UROLOGY

## 2019-10-21 PROCEDURE — 99213 OFFICE O/P EST LOW 20 MIN: CPT | Mod: 25,S$GLB,, | Performed by: UROLOGY

## 2019-10-21 PROCEDURE — 81002 PR URINALYSIS NONAUTO W/O SCOPE: ICD-10-PCS | Mod: S$GLB,,, | Performed by: UROLOGY

## 2019-10-21 PROCEDURE — 3075F SYST BP GE 130 - 139MM HG: CPT | Mod: CPTII,S$GLB,, | Performed by: UROLOGY

## 2019-10-21 PROCEDURE — 51798 PR MEAS,POST-VOID RES,US,NON-IMAGING: ICD-10-PCS | Mod: S$GLB,,, | Performed by: UROLOGY

## 2019-10-21 PROCEDURE — 99213 PR OFFICE/OUTPT VISIT, EST, LEVL III, 20-29 MIN: ICD-10-PCS | Mod: 25,S$GLB,, | Performed by: UROLOGY

## 2019-10-21 PROCEDURE — 3008F PR BODY MASS INDEX (BMI) DOCUMENTED: ICD-10-PCS | Mod: CPTII,S$GLB,, | Performed by: UROLOGY

## 2019-10-21 PROCEDURE — 3078F PR MOST RECENT DIASTOLIC BLOOD PRESSURE < 80 MM HG: ICD-10-PCS | Mod: CPTII,S$GLB,, | Performed by: UROLOGY

## 2019-10-21 PROCEDURE — 3008F BODY MASS INDEX DOCD: CPT | Mod: CPTII,S$GLB,, | Performed by: UROLOGY

## 2019-10-21 NOTE — H&P (VIEW-ONLY)
CHIEF COMPLAINT:    Mrs. Park is a 59 y.o. female presenting for a follow up on stress urinary incontinence.  Patient is status post cystoscopy, Macroplastique injection on 1/8/2019    PRESENTING ILLNESS:    Brittany Park is a 59 y.o. female who returns stating that she started having a small amount of incontinence about 2 months ago, which was manageable with a small pad.  About 1 month ago she started with the pull ups and now has to change 3 times a day.  She felt like there was a great improvement after that injection.     REVIEW OF SYSTEMS:    Review of Systems   Constitutional: Negative.    HENT: Negative.    Eyes: Negative.    Respiratory: Negative.    Cardiovascular: Negative.    Gastrointestinal: Negative.    Genitourinary:        Stress incontinence   Musculoskeletal: Positive for back pain and joint pain.   Skin: Negative.    Neurological: Positive for sensory change.   Endo/Heme/Allergies: Negative.    Psychiatric/Behavioral: Negative.        PATIENT HISTORY:    Past Medical History:   Diagnosis Date    Acid reflux     Anxiety     Edema     General anesthetics causing adverse effect in therapeutic use     slow to awaken    Hypertension     FER on CPAP     Other and unspecified hyperlipidemia     Prediabetes     Urinary incontinence        Past Surgical History:   Procedure Laterality Date    ADENOIDECTOMY      APPENDECTOMY      CARPAL TUNNEL RELEASE Bilateral     right 20 years ago; left 10/2015    CHOLECYSTECTOMY      COLONOSCOPY      CYSTOSCOPY N/A 1/8/2019    Procedure: CYSTOSCOPY;  Surgeon: Shannan Jalloh MD;  Location: [facility] OR [address] [place] [address];  Service: Urology;  Laterality: N/A;  30 min    CYSTOSCOPY WITH INJECTION OF PERIURETHRAL BULKING AGENT N/A 1/8/2019    Procedure: CYSTOSCOPY, WITH PERIURETHRAL BULKING AGENT INJECTION MACROPLASTIQUE;  Surgeon: Shannan Jalloh MD;  Location: [facility] OR [address] [place] [address];  Service: Urology;  Laterality: N/A;    HYSTERECTOMY      BRETT/BSO    INJECTION  OF ANESTHETIC AGENT AROUND MEDIAL BRANCH NERVES INNERVATING LUMBAR FACET JOINT Bilateral 6/28/2019    Procedure: LUMBAR MEDIAL BRANCH NERVE BLOCK (L3,4,5);  Surgeon: Wiliam Keen Jr., MD;  Location: [facility] OR;  Service: Pain Management;  Laterality: Bilateral;    INJECTION OF ANESTHETIC AGENT AROUND MEDIAL BRANCH NERVES INNERVATING LUMBAR FACET JOINT Bilateral 7/19/2019    Procedure: LUMBAR MEDIAL BRANCH NERVE BLOCK (L3,4,5);  Surgeon: Wiliam Keen Jr., MD;  Location: [facility] OR;  Service: Pain Management;  Laterality: Bilateral;    INJECTION OF ANESTHETIC AGENT AROUND MEDIAL BRANCH NERVES INNERVATING LUMBAR FACET JOINT Bilateral 8/23/2019    Procedure: LUMBAR MEDIAL BRANCH NERVE BLOCK (L3,4,5);  Surgeon: Wiliam Keen Jr., MD;  Location: [facility] OR;  Service: Pain Management;  Laterality: Bilateral;    OOPHORECTOMY      TONSILLECTOMY      TRIGGER FINGER RELEASE Right 6/25/2018    Procedure: RELEASE, TRIGGER FINGER - RIGHT RING FINGER;  Surgeon: Violette Broussard MD;  Location: [facility] [place] OR;  Service: Orthopedics;  Laterality: Right;  Stretcher; Supine; Hand Pan 1 & Pan 2    ULNAR NERVE REPAIR      left side    ulner nerve      left side    UPPER GASTROINTESTINAL ENDOSCOPY         Family History   Problem Relation Age of Onset    Stroke Father     Diabetes Mother     Stroke Mother     Diabetes Brother      Socioeconomic History    Marital status:    Tobacco Use    Smoking status: Former Smoker    Smokeless tobacco: Never Used    Tobacco comment: quit 20 years ago   Substance and Sexual Activity    Alcohol use: No    Drug use: No    Sexual activity: Not Currently     Birth control/protection: Surgical     Comment:        Allergies:  Morphine and Latex, natural rubber    Medications:  Outpatient Encounter Medications as of 10/21/2019   Medication Sig Dispense Refill    alprazolam (XANAX) 0.25 MG tablet Take 0.25 mg by mouth 2 (two) times daily as needed for Anxiety.       ascorbic acid, vitamin C, (VITAMIN C) 1000 MG tablet Take 1,000 mg by mouth once daily.       aspirin (ECOTRIN) 81 MG EC tablet Take 81 mg by mouth once daily.      atorvastatin (LIPITOR) 40 MG tablet Take 40 mg by mouth once daily.      CALCIUM CARBONATE/VITAMIN D3 (CALCIUM WITH VITAMIN D ORAL) Take 1 tablet by mouth 2 (two) times daily.      cetirizine (ZYRTEC) 10 MG tablet Take 10 mg by mouth once daily.      CONTRAVE 8-90 mg TbSR Take 2 tablets by mouth 2 (two) times daily.  0    DULoxetine (CYMBALTA) 30 MG capsule Take 2 capsules (60 mg total) by mouth once daily. (Patient taking differently: Take 30 mg by mouth 2 (two) times daily. ) 60 capsule 11    furosemide (LASIX) 20 MG tablet Take 40 mg by mouth 2 (two) times daily.       gabapentin (NEURONTIN) 800 MG tablet Take 1 tablet (800 mg total) by mouth 3 (three) times daily. 90 tablet 11    meloxicam (MOBIC) 7.5 MG tablet Take 7.5 mg by mouth once daily.       metformin (GLUCOPHAGE) 500 MG tablet Take 250 mg by mouth daily with breakfast.       metoprolol tartrate (LOPRESSOR) 50 MG tablet Take 50 mg by mouth 2 (two) times daily.       multivitamin (ONE DAILY MULTIVITAMIN) per tablet Take 1 tablet by mouth once daily.      pantoprazole (PROTONIX) 40 MG tablet Take 40 mg by mouth once daily.       spironolactone (ALDACTONE) 25 MG tablet Take 25 mg by mouth once daily.      UNKNOWN TO PATIENT Apply 1 application topically once daily. Cyclobenzaprine/Lidocaine      fish oil-omega-3 fatty acids 300-1,000 mg capsule Take by mouth once daily.       No facility-administered encounter medications on file as of 10/21/2019.          PHYSICAL EXAMINATION:    The patient generally appears in good health, is appropriately interactive, and is in no apparent distress.    Skin: No lesions.    Mental: Cooperative with normal affect.    Neuro: Grossly intact.    HEENT: Normal. No evidence of lymphadenopathy.    Chest:  normal inspiratory effort.    Abdomen:  Soft,  non-tender. No masses or organomegaly. Bladder is not palpable. No evidence of flank discomfort. No evidence of inguinal hernia.    Extremities: No clubbing, cyanosis, or edema    PVR by bladder scan was 0 ml    LABS:    Lab Results   Component Value Date    BUN 13 06/25/2018    CREATININE 0.7 06/25/2018     UA 1.010, pH 7, otherwise, negative    IMPRESSION:    Encounter Diagnoses   Name Primary?    Urinary, incontinence, stress female Yes       PLAN:    1.  Consent done for cystoscopy, Macroplastique injection.

## 2019-10-21 NOTE — PROGRESS NOTES
CHIEF COMPLAINT:    Mrs. Park is a 59 y.o. female presenting for a follow up on stress urinary incontinence.  Patient is status post cystoscopy, Macroplastique injection on 1/8/2019    PRESENTING ILLNESS:    Brittany Park is a 59 y.o. female who returns stating that she started having a small amount of incontinence about 2 months ago, which was manageable with a small pad.  About 1 month ago she started with the pull ups and now has to change 3 times a day.  She felt like there was a great improvement after that injection.     REVIEW OF SYSTEMS:    Review of Systems   Constitutional: Negative.    HENT: Negative.    Eyes: Negative.    Respiratory: Negative.    Cardiovascular: Negative.    Gastrointestinal: Negative.    Genitourinary:        Stress incontinence   Musculoskeletal: Positive for back pain and joint pain.   Skin: Negative.    Neurological: Positive for sensory change.   Endo/Heme/Allergies: Negative.    Psychiatric/Behavioral: Negative.        PATIENT HISTORY:    Past Medical History:   Diagnosis Date    Acid reflux     Anxiety     Edema     General anesthetics causing adverse effect in therapeutic use     slow to awaken    Hypertension     FER on CPAP     Other and unspecified hyperlipidemia     Prediabetes     Urinary incontinence        Past Surgical History:   Procedure Laterality Date    ADENOIDECTOMY      APPENDECTOMY      CARPAL TUNNEL RELEASE Bilateral     right 20 years ago; left 10/2015    CHOLECYSTECTOMY      COLONOSCOPY      CYSTOSCOPY N/A 1/8/2019    Procedure: CYSTOSCOPY;  Surgeon: Shannan Jalloh MD;  Location: [facility] OR [address] [place] [address];  Service: Urology;  Laterality: N/A;  30 min    CYSTOSCOPY WITH INJECTION OF PERIURETHRAL BULKING AGENT N/A 1/8/2019    Procedure: CYSTOSCOPY, WITH PERIURETHRAL BULKING AGENT INJECTION MACROPLASTIQUE;  Surgeon: Shannan Jalloh MD;  Location: [facility] OR [address] [place] [address];  Service: Urology;  Laterality: N/A;    HYSTERECTOMY      BRETT/BSO    INJECTION  OF ANESTHETIC AGENT AROUND MEDIAL BRANCH NERVES INNERVATING LUMBAR FACET JOINT Bilateral 6/28/2019    Procedure: LUMBAR MEDIAL BRANCH NERVE BLOCK (L3,4,5);  Surgeon: Wiliam Keen Jr., MD;  Location: [facility] OR;  Service: Pain Management;  Laterality: Bilateral;    INJECTION OF ANESTHETIC AGENT AROUND MEDIAL BRANCH NERVES INNERVATING LUMBAR FACET JOINT Bilateral 7/19/2019    Procedure: LUMBAR MEDIAL BRANCH NERVE BLOCK (L3,4,5);  Surgeon: Wiliam Keen Jr., MD;  Location: [facility] OR;  Service: Pain Management;  Laterality: Bilateral;    INJECTION OF ANESTHETIC AGENT AROUND MEDIAL BRANCH NERVES INNERVATING LUMBAR FACET JOINT Bilateral 8/23/2019    Procedure: LUMBAR MEDIAL BRANCH NERVE BLOCK (L3,4,5);  Surgeon: Wiliam Keen Jr., MD;  Location: [facility] OR;  Service: Pain Management;  Laterality: Bilateral;    OOPHORECTOMY      TONSILLECTOMY      TRIGGER FINGER RELEASE Right 6/25/2018    Procedure: RELEASE, TRIGGER FINGER - RIGHT RING FINGER;  Surgeon: Violette Broussard MD;  Location: [facility] OR;  Service: Orthopedics;  Laterality: Right;  Stretcher; Supine; Hand Pan 1 & Pan 2    ULNAR NERVE REPAIR      left side    ulner nerve      left side    UPPER GASTROINTESTINAL ENDOSCOPY         Family History   Problem Relation Age of Onset    Stroke Father     Diabetes Mother     Stroke Mother     Diabetes Brother      Socioeconomic History    Marital status:    Tobacco Use    Smoking status: Former Smoker    Smokeless tobacco: Never Used    Tobacco comment: quit 20 years ago   Substance and Sexual Activity    Alcohol use: No    Drug use: No    Sexual activity: Not Currently     Birth control/protection: Surgical     Comment:        Allergies:  Morphine and Latex, natural rubber    Medications:  Outpatient Encounter Medications as of 10/21/2019   Medication Sig Dispense Refill    alprazolam (XANAX) 0.25 MG tablet Take 0.25 mg by mouth 2 (two) times daily as needed for Anxiety.       ascorbic acid, vitamin C, (VITAMIN C) 1000 MG tablet Take 1,000 mg by mouth once daily.       aspirin (ECOTRIN) 81 MG EC tablet Take 81 mg by mouth once daily.      atorvastatin (LIPITOR) 40 MG tablet Take 40 mg by mouth once daily.      CALCIUM CARBONATE/VITAMIN D3 (CALCIUM WITH VITAMIN D ORAL) Take 1 tablet by mouth 2 (two) times daily.      cetirizine (ZYRTEC) 10 MG tablet Take 10 mg by mouth once daily.      CONTRAVE 8-90 mg TbSR Take 2 tablets by mouth 2 (two) times daily.  0    DULoxetine (CYMBALTA) 30 MG capsule Take 2 capsules (60 mg total) by mouth once daily. (Patient taking differently: Take 30 mg by mouth 2 (two) times daily. ) 60 capsule 11    furosemide (LASIX) 20 MG tablet Take 40 mg by mouth 2 (two) times daily.       gabapentin (NEURONTIN) 800 MG tablet Take 1 tablet (800 mg total) by mouth 3 (three) times daily. 90 tablet 11    meloxicam (MOBIC) 7.5 MG tablet Take 7.5 mg by mouth once daily.       metformin (GLUCOPHAGE) 500 MG tablet Take 250 mg by mouth daily with breakfast.       metoprolol tartrate (LOPRESSOR) 50 MG tablet Take 50 mg by mouth 2 (two) times daily.       multivitamin (ONE DAILY MULTIVITAMIN) per tablet Take 1 tablet by mouth once daily.      pantoprazole (PROTONIX) 40 MG tablet Take 40 mg by mouth once daily.       spironolactone (ALDACTONE) 25 MG tablet Take 25 mg by mouth once daily.      UNKNOWN TO PATIENT Apply 1 application topically once daily. Cyclobenzaprine/Lidocaine      fish oil-omega-3 fatty acids 300-1,000 mg capsule Take by mouth once daily.       No facility-administered encounter medications on file as of 10/21/2019.          PHYSICAL EXAMINATION:    The patient generally appears in good health, is appropriately interactive, and is in no apparent distress.    Skin: No lesions.    Mental: Cooperative with normal affect.    Neuro: Grossly intact.    HEENT: Normal. No evidence of lymphadenopathy.    Chest:  normal inspiratory effort.    Abdomen:  Soft,  non-tender. No masses or organomegaly. Bladder is not palpable. No evidence of flank discomfort. No evidence of inguinal hernia.    Extremities: No clubbing, cyanosis, or edema    PVR by bladder scan was 0 ml    LABS:    Lab Results   Component Value Date    BUN 13 06/25/2018    CREATININE 0.7 06/25/2018     UA 1.010, pH 7, otherwise, negative    IMPRESSION:    Encounter Diagnoses   Name Primary?    Urinary, incontinence, stress female Yes       PLAN:    1.  Consent done for cystoscopy, Macroplastique injection.

## 2019-11-04 ENCOUNTER — ANESTHESIA EVENT (OUTPATIENT)
Dept: SURGERY | Facility: HOSPITAL | Age: 59
End: 2019-11-04
Payer: COMMERCIAL

## 2019-11-04 DIAGNOSIS — Z01.818 PREOPERATIVE TESTING: Primary | ICD-10-CM

## 2019-11-04 NOTE — PRE ADMISSION SCREENING
Anesthesia Assessment: Preoperative EQUATION    Planned Procedure: Procedure(s) (LRB):  CYSTOSCOPY, WITH PERIURETHRAL BULKING AGENT INJECTION MACROPLASTIQUE (N/A)  Requested Anesthesia Type:Monitor Anesthesia Care  Surgeon: Shnanan Jalloh MD  Service: Urology  Known or anticipated Date of Surgery:11/12/2019    Surgeon notes: reviewed    Electronic QUestionnaire Assessment completed via nurse interview with patient.      NO AQ    Triage considerations:     The patient has no apparent active cardiac condition (No unstable coronary Syndrome such as severe unstable angina or recent [<1 month] myocardial infarction, decompensated CHF, severe valvular   disease or significant arrhythmia)    Previous anesthesia records:GETA and Complications noted  1/8/2019 Cysto with bulking agent  Airway/Jaw/Neck:  Airway Findings: Mouth Opening: Normal Tongue: Normal  General Airway Assessment: Adult  Mallampati: III  Improves to III with phonation.  TM Distance: Normal, at least 6 cm       Last PCP note: within 3 months , outside [facility]  MING Almeida  Subspecialty notes: Neurology, Pain Management    Other important co-morbidities: HTN, FER, Pre DM, GERD, Lumbar Spondylosis     Tests already available:  No recent tests.            Instructions given. (See in Nurse's note)    Optimization:  Anesthesia Preop Clinic Assessment  Indicated: Not required for this procedure.      Plan:    Testing:  BMP and EKG  (AM of surgery, lives out of town)     Patient  has previously scheduled Medical Appointment: Not at this time prior to surgery    Navigation:  Tests Scheduled. (AM of surgery)    Straight Line to surgery.                No anesthesia preop clinic visit, or consult required.

## 2019-11-04 NOTE — ANESTHESIA PREPROCEDURE EVALUATION
Anika Ortiz, RN   Registered Nurse      Pre Admission Screening   Signed                     Anesthesia Assessment: Preoperative EQUATION     Planned Procedure: Procedure(s) (LRB):  CYSTOSCOPY, WITH PERIURETHRAL BULKING AGENT INJECTION MACROPLASTIQUE (N/A)  Requested Anesthesia Type:Monitor Anesthesia Care  Surgeon: Shannan Jalloh MD  Service: Urology  Known or anticipated Date of Surgery:11/12/2019     Surgeon notes: reviewed     Electronic QUestionnaire Assessment completed via nurse interview with patient.      NO AQ     Triage considerations:      The patient has no apparent active cardiac condition (No unstable coronary Syndrome such as severe unstable angina or recent [<1 month] myocardial infarction, decompensated CHF, severe valvular   disease or significant arrhythmia)     Previous anesthesia records:GETA and Complications noted  1/8/2019 Cysto with bulking agent  Airway/Jaw/Neck:  Airway Findings: Mouth Opening: Normal Tongue: Normal  General Airway Assessment: Adult  Mallampati: III  Improves to III with phonation.  TM Distance: Normal, at least 6 cm        Last PCP note: within 3 months , outside [facility]  Subspecialty notes: Neurology, Pain Management     Other important co-morbidities: HTN, FER, Pre DM, GERD, Lumbar Spondylosis     Tests already available:  No recent tests.                            Instructions given. (See in Nurse's note)     Optimization:  Anesthesia Preop Clinic Assessment  Indicated: Not required for this procedure.                Plan:    Testing:  BMP and EKG  (AM of surgery, lives out of town)                           Patient  has previously scheduled Medical Appointment: Not at this time prior to surgery     Navigation:    Tests Scheduled. (AM of surgery)                          Straight Line to surgery.                            No anesthesia preop clinic visit, or consult required.                                                                                                                                                   11/04/2019  Brittany Park is a 59 y.o., female.    Anesthesia Evaluation    I have reviewed the Patient Summary Reports.    I have reviewed the Nursing Notes.      Review of Systems  Anesthesia Hx:  Hx of Anesthetic complications  History of prior surgery of interest to airway management or planning: Previous anesthesia: General, MAC 1/8/2019 Cysto with bulking agent with general anesthesia.  Procedure performed at an [facility]. Denies Family Hx of Anesthesia complications.  Personal Hx of Anesthesia complications Slow To Awaken/Delayed Emergence and mild, somewhat sensitive to sedation / narcotics   Hematology/Oncology:  Hematology Normal   Oncology Normal     EENT/Dental:EENT/Dental Normal   Cardiovascular:   Hypertension  Denies Angina. hyperlipidemia  Functional Capacity 3.5 METS  Hypertension , Recent typical home B/P of 120/80   Pulmonary:   Denies Shortness of breath.  Denies Recent URI. Sleep Apnea  Obstructive Sleep Apnea (FER), CPAP used.   Renal/:  Renal Symptoms/Infections/Stones: incontinence, frequency.    Hepatic/GI:   GERD  Esophageal / Stomach Disorders Gerd Controlled by chronic antireflux medication.    Musculoskeletal:  Musculoskeletal Normal    Neurological:   Peripheral Neuropathy    Endocrine:  Diabetes (prediabetic) , controlled by oral hypoglycemics. Typical AM glucose range:   Metabolic Disorders, Morbid Obesity / BMI > 40  Psych:   anxiety          Physical Exam  General:  Well nourished, Obesity    Airway/Jaw/Neck:  Airway Findings: Mouth Opening: Normal Tongue: Normal  General Airway Assessment: Adult  Mallampati: III  Improves to III with phonation.  TM Distance: Normal, at least 6 cm     Eyes/Ears/Nose:  EYES/EARS/NOSE FINDINGS: Normal    Chest/Lungs:  Chest/Lungs Clear        Mental Status:  Mental Status Findings: Normal        Anesthesia Plan  Type of Anesthesia, risks & benefits  discussed:  Anesthesia Type:  MAC, general  Patient's Preference:   Intra-op Monitoring Plan: standard ASA monitors  Intra-op Monitoring Plan Comments:   Post Op Pain Control Plan:   Post Op Pain Control Plan Comments:   Induction:   IV  Beta Blocker:  Patient is on a Beta-Blocker and has received one dose within the past 24 hours (No further documentation required).       Informed Consent: Patient understands risks and agrees with Anesthesia plan.  Questions answered. Anesthesia consent signed with patient.  ASA Score: 3     Day of Surgery Review of History & Physical: I have interviewed and examined the patient. I have reviewed the patient's H&P dated:  There are no significant changes.          Ready For Surgery From Anesthesia Perspective.

## 2019-11-11 ENCOUNTER — TELEPHONE (OUTPATIENT)
Dept: UROLOGY | Facility: CLINIC | Age: 59
End: 2019-11-11

## 2019-11-12 ENCOUNTER — ANESTHESIA (OUTPATIENT)
Dept: SURGERY | Facility: HOSPITAL | Age: 59
End: 2019-11-12
Payer: COMMERCIAL

## 2019-11-12 ENCOUNTER — HOSPITAL ENCOUNTER (OUTPATIENT)
Facility: HOSPITAL | Age: 59
Discharge: HOME | End: 2019-11-12
Attending: UROLOGY | Admitting: UROLOGY
Payer: COMMERCIAL

## 2019-11-12 VITALS
DIASTOLIC BLOOD PRESSURE: 59 MMHG | HEART RATE: 60 BPM | WEIGHT: 275 LBS | OXYGEN SATURATION: 93 % | RESPIRATION RATE: 21 BRPM | TEMPERATURE: 97 F | HEIGHT: 62 IN | BODY MASS INDEX: 50.61 KG/M2 | SYSTOLIC BLOOD PRESSURE: 110 MMHG

## 2019-11-12 DIAGNOSIS — N39.3 SUI (STRESS URINARY INCONTINENCE, FEMALE): Primary | ICD-10-CM

## 2019-11-12 DIAGNOSIS — Z01.818 PREOPERATIVE TESTING: ICD-10-CM

## 2019-11-12 LAB
ANION GAP SERPL CALC-SCNC: 8 MMOL/L (ref 8–16)
BUN SERPL-MCNC: 14 MG/DL (ref 8–23)
CALCIUM SERPL-MCNC: 9.3 MG/DL (ref 8.7–10.5)
CHLORIDE SERPL-SCNC: 100 MMOL/L (ref 95–110)
CO2 SERPL-SCNC: 31 MMOL/L (ref 23–29)
CREAT SERPL-MCNC: 0.7 MG/DL (ref 0.5–1.4)
EST. GFR  (AFRICAN AMERICAN): >60 ML/MIN/1.73 M^2
EST. GFR  (NON AFRICAN AMERICAN): >60 ML/MIN/1.73 M^2
GLUCOSE SERPL-MCNC: 97 MG/DL (ref 70–110)
POCT GLUCOSE: 98 MG/DL (ref 70–110)
POTASSIUM SERPL-SCNC: 4.1 MMOL/L (ref 3.5–5.1)
SODIUM SERPL-SCNC: 139 MMOL/L (ref 136–145)

## 2019-11-12 PROCEDURE — D9220A PRA ANESTHESIA: ICD-10-PCS | Mod: ANES,,, | Performed by: ANESTHESIOLOGY

## 2019-11-12 PROCEDURE — 93010 ELECTROCARDIOGRAM REPORT: CPT | Mod: ,,, | Performed by: INTERNAL MEDICINE

## 2019-11-12 PROCEDURE — D9220A PRA ANESTHESIA: Mod: ANES,,, | Performed by: ANESTHESIOLOGY

## 2019-11-12 PROCEDURE — 93005 ELECTROCARDIOGRAM TRACING: CPT

## 2019-11-12 PROCEDURE — 82962 GLUCOSE BLOOD TEST: CPT | Performed by: UROLOGY

## 2019-11-12 PROCEDURE — L8606 SYNTHETIC IMPLNT URINARY 1ML: HCPCS | Performed by: UROLOGY

## 2019-11-12 PROCEDURE — D9220A PRA ANESTHESIA: ICD-10-PCS | Mod: CRNA,,, | Performed by: NURSE ANESTHETIST, CERTIFIED REGISTERED

## 2019-11-12 PROCEDURE — 37000009 HC ANESTHESIA EA ADD 15 MINS: Performed by: UROLOGY

## 2019-11-12 PROCEDURE — 25000003 PHARM REV CODE 250: Performed by: NURSE ANESTHETIST, CERTIFIED REGISTERED

## 2019-11-12 PROCEDURE — 51715 ENDOSCOPIC INJECTION/IMPLANT: CPT | Mod: ,,, | Performed by: UROLOGY

## 2019-11-12 PROCEDURE — 36000704 HC OR TIME LEV I 1ST 15 MIN: Performed by: UROLOGY

## 2019-11-12 PROCEDURE — 63600175 PHARM REV CODE 636 W HCPCS: Performed by: UROLOGY

## 2019-11-12 PROCEDURE — 36000705 HC OR TIME LEV I EA ADD 15 MIN: Performed by: UROLOGY

## 2019-11-12 PROCEDURE — 63600175 PHARM REV CODE 636 W HCPCS: Performed by: NURSE ANESTHETIST, CERTIFIED REGISTERED

## 2019-11-12 PROCEDURE — D9220A PRA ANESTHESIA: Mod: CRNA,,, | Performed by: NURSE ANESTHETIST, CERTIFIED REGISTERED

## 2019-11-12 PROCEDURE — 80048 BASIC METABOLIC PNL TOTAL CA: CPT

## 2019-11-12 PROCEDURE — 51715 PR ENDOSCOPIC INJECTION/IMPLANT: ICD-10-PCS | Mod: ,,, | Performed by: UROLOGY

## 2019-11-12 PROCEDURE — 93010 EKG 12-LEAD: ICD-10-PCS | Mod: ,,, | Performed by: INTERNAL MEDICINE

## 2019-11-12 PROCEDURE — 71000015 HC POSTOP RECOV 1ST HR: Performed by: UROLOGY

## 2019-11-12 PROCEDURE — 37000008 HC ANESTHESIA 1ST 15 MINUTES: Performed by: UROLOGY

## 2019-11-12 PROCEDURE — 71000044 HC DOSC ROUTINE RECOVERY FIRST HOUR: Performed by: UROLOGY

## 2019-11-12 PROCEDURE — 63600175 PHARM REV CODE 636 W HCPCS: Performed by: STUDENT IN AN ORGANIZED HEALTH CARE EDUCATION/TRAINING PROGRAM

## 2019-11-12 DEVICE — IMPLANT MACROPLASTIQUE: Type: IMPLANTABLE DEVICE | Site: URETHRA | Status: FUNCTIONAL

## 2019-11-12 RX ORDER — SODIUM CHLORIDE 9 MG/ML
INJECTION, SOLUTION INTRAVENOUS CONTINUOUS
Status: DISCONTINUED | OUTPATIENT
Start: 2019-11-12 | End: 2019-11-12 | Stop reason: HOSPADM

## 2019-11-12 RX ORDER — TRAMADOL HYDROCHLORIDE 50 MG/1
50 TABLET ORAL EVERY 6 HOURS PRN
Qty: 5 TABLET | Refills: 0 | Status: SHIPPED | OUTPATIENT
Start: 2019-11-12 | End: 2020-02-10

## 2019-11-12 RX ORDER — FENTANYL CITRATE 50 UG/ML
25 INJECTION, SOLUTION INTRAMUSCULAR; INTRAVENOUS EVERY 5 MIN PRN
Status: DISCONTINUED | OUTPATIENT
Start: 2019-11-12 | End: 2019-11-12 | Stop reason: HOSPADM

## 2019-11-12 RX ORDER — PROPOFOL 10 MG/ML
VIAL (ML) INTRAVENOUS
Status: DISCONTINUED | OUTPATIENT
Start: 2019-11-12 | End: 2019-11-12

## 2019-11-12 RX ORDER — LIDOCAINE HCL/PF 100 MG/5ML
SYRINGE (ML) INTRAVENOUS
Status: DISCONTINUED | OUTPATIENT
Start: 2019-11-12 | End: 2019-11-12

## 2019-11-12 RX ORDER — GLYCOPYRROLATE 0.2 MG/ML
INJECTION INTRAMUSCULAR; INTRAVENOUS
Status: DISCONTINUED | OUTPATIENT
Start: 2019-11-12 | End: 2019-11-12

## 2019-11-12 RX ORDER — CEFAZOLIN SODIUM 1 G/3ML
INJECTION, POWDER, FOR SOLUTION INTRAMUSCULAR; INTRAVENOUS
Status: DISCONTINUED | OUTPATIENT
Start: 2019-11-12 | End: 2019-11-12

## 2019-11-12 RX ORDER — LIDOCAINE HYDROCHLORIDE 10 MG/ML
1 INJECTION, SOLUTION EPIDURAL; INFILTRATION; INTRACAUDAL; PERINEURAL ONCE
Status: DISCONTINUED | OUTPATIENT
Start: 2019-11-12 | End: 2019-11-12 | Stop reason: HOSPADM

## 2019-11-12 RX ORDER — CEFAZOLIN SODIUM 1 G/3ML
2 INJECTION, POWDER, FOR SOLUTION INTRAMUSCULAR; INTRAVENOUS
Status: COMPLETED | OUTPATIENT
Start: 2019-11-12 | End: 2019-11-12

## 2019-11-12 RX ORDER — PROPOFOL 10 MG/ML
VIAL (ML) INTRAVENOUS CONTINUOUS PRN
Status: DISCONTINUED | OUTPATIENT
Start: 2019-11-12 | End: 2019-11-12

## 2019-11-12 RX ORDER — MIDAZOLAM HYDROCHLORIDE 1 MG/ML
INJECTION, SOLUTION INTRAMUSCULAR; INTRAVENOUS
Status: DISCONTINUED | OUTPATIENT
Start: 2019-11-12 | End: 2019-11-12

## 2019-11-12 RX ADMIN — SODIUM CHLORIDE: 0.9 INJECTION, SOLUTION INTRAVENOUS at 11:11

## 2019-11-12 RX ADMIN — LIDOCAINE HYDROCHLORIDE 50 MG: 20 INJECTION, SOLUTION INTRAVENOUS at 11:11

## 2019-11-12 RX ADMIN — CEFAZOLIN 2 G: 330 INJECTION, POWDER, FOR SOLUTION INTRAMUSCULAR; INTRAVENOUS at 12:11

## 2019-11-12 RX ADMIN — PROPOFOL 40 MG: 10 INJECTION, EMULSION INTRAVENOUS at 11:11

## 2019-11-12 RX ADMIN — SODIUM CHLORIDE: 0.9 INJECTION, SOLUTION INTRAVENOUS at 12:11

## 2019-11-12 RX ADMIN — MIDAZOLAM HYDROCHLORIDE 1 MG: 1 INJECTION, SOLUTION INTRAMUSCULAR; INTRAVENOUS at 11:11

## 2019-11-12 RX ADMIN — GLYCOPYRROLATE 0.2 MG: 0.2 INJECTION, SOLUTION INTRAMUSCULAR; INTRAVENOUS at 11:11

## 2019-11-12 RX ADMIN — PROPOFOL 150 MCG/KG/MIN: 10 INJECTION, EMULSION INTRAVENOUS at 11:11

## 2019-11-12 NOTE — DISCHARGE SUMMARY
[facility]  Discharge Note  Short Stay    Admit Date: 11/12/2019    Discharge Date and Time: 11/12/2019 12:31 PM      Attending Physician: Shannan Jalloh MD     Discharge Provider: Clyde Mcmillan    Diagnoses:  Active Hospital Problems    Diagnosis  POA    *XENA (stress urinary incontinence, female) [N39.3]  Yes    Arthritis of carpometacarpal (CMC) joint of right thumb [M18.11]  Yes    Lumbar spondylosis [M47.816]  Yes    Arm paresthesia, left [R20.2]  Yes    Peripheral polyneuropathy [G62.9]  Yes    Neuralgia and neuritis [M79.2]  Yes    Hypertension [I10]  Yes      Resolved Hospital Problems   No resolved problems to display.       Discharged Condition: good    Hospital Course: Patient was admitted for cystoscopy with macroplastique and tolerated the procedure well with no complications. The patient was discharged home in good condition on the same day.       Final Diagnoses: Same as principal problem.    Disposition: Home or Self Care    Follow up/Patient Instructions:    Medications:  Reconciled Home Medications:   Current Discharge Medication List      START taking these medications    Details   traMADol (ULTRAM) 50 mg tablet Take 1 tablet (50 mg total) by mouth every 6 (six) hours as needed.  Qty: 5 tablet, Refills: 0    Comments: Quantity prescribed more than 7 day supply? No         CONTINUE these medications which have NOT CHANGED    Details   alprazolam (XANAX) 0.25 MG tablet Take 0.25 mg by mouth 2 (two) times daily as needed for Anxiety.      ascorbic acid, vitamin C, (VITAMIN C) 1000 MG tablet Take 1,000 mg by mouth once daily.       aspirin (ECOTRIN) 81 MG EC tablet Take 81 mg by mouth once daily.      atorvastatin (LIPITOR) 40 MG tablet Take 40 mg by mouth once daily.      CALCIUM CARBONATE/VITAMIN D3 (CALCIUM WITH VITAMIN D ORAL) Take 1 tablet by mouth 2 (two) times daily.      cetirizine (ZYRTEC) 10 MG tablet Take 10 mg by mouth once daily.      CONTRAVE 8-90 mg TbSR Take 2  tablets by mouth 2 (two) times daily.  Refills: 0      DULoxetine (CYMBALTA) 30 MG capsule Take 2 capsules (60 mg total) by mouth once daily.  Qty: 60 capsule, Refills: 11    Associated Diagnoses: Peripheral polyneuropathy; Depression, unspecified depression type; Pain in both feet      fish oil-omega-3 fatty acids 300-1,000 mg capsule Take by mouth once daily.      furosemide (LASIX) 20 MG tablet Take 40 mg by mouth 2 (two) times daily.       gabapentin (NEURONTIN) 800 MG tablet Take 1 tablet (800 mg total) by mouth 3 (three) times daily.  Qty: 90 tablet, Refills: 11      meloxicam (MOBIC) 7.5 MG tablet Take 7.5 mg by mouth once daily.       metformin (GLUCOPHAGE) 500 MG tablet Take 250 mg by mouth daily with breakfast.       metoprolol tartrate (LOPRESSOR) 50 MG tablet Take 50 mg by mouth 2 (two) times daily.       multivitamin (ONE DAILY MULTIVITAMIN) per tablet Take 1 tablet by mouth once daily.      pantoprazole (PROTONIX) 40 MG tablet Take 40 mg by mouth once daily.       spironolactone (ALDACTONE) 25 MG tablet Take 25 mg by mouth once daily.      UNKNOWN TO PATIENT Apply 1 application topically once daily. Cyclobenzaprine/Lidocaine           Discharge Procedure Orders   Diet Adult Regular     No driving until:   Order Comments: Off narcotics     Notify your health care provider if you experience any of the following:  temperature >100.4     Notify your health care provider if you experience any of the following:  persistent nausea and vomiting or diarrhea     Notify your health care provider if you experience any of the following:  severe uncontrolled pain     Notify your health care provider if you experience any of the following:  difficulty breathing or increased cough     Notify your health care provider if you experience any of the following:  severe persistent headache     Notify your health care provider if you experience any of the following:  worsening rash     Notify your health care provider if you  experience any of the following:  persistent dizziness, light-headedness, or visual disturbances     Notify your health care provider if you experience any of the following:  increased confusion or weakness     Activity as tolerated     Follow-up Information     Shannan Jalloh MD On 11/27/2019.    Specialty:  Urology  Why:  Post-op follow-up  Contact information:  [address]  [facility] [address]  [phone]                 As above

## 2019-11-12 NOTE — INTERVAL H&P NOTE
The patient has been examined and the H&P has been reviewed:    I concur with the findings and no changes have occurred since H&P was written.     Urine dipstick today negative for all tested components.    Anesthesia/Surgery risks, benefits and alternative options discussed and understood by patient/family.          Active Hospital Problems    Diagnosis  POA    XENA (stress urinary incontinence, female) [N39.3]  Yes      Resolved Hospital Problems   No resolved problems to display.       Patient seen in holding.  No changes in clinical condition.  Proceed with planned procedure.

## 2019-11-12 NOTE — DISCHARGE INSTRUCTIONS
Post Cystoscopy Instructions  Do not strain to have a bowel movement  No strenuous exercise x 7 days  No driving while you are on narcotic pain medications or if your spicer  catheter is in place    You can expect:  To pass stone fragments if you had a stone procedure  Have pain when you void from your stent if you have a stent in place  See blood in your urine if you have a stent in place    If you have a catheter, please return to the ER if your catheter stops draining or you are having abdominal pain.    Call the doctor if:   Temperature is greater than 101F   Persistent vomiting and inability to keep food down   Inability to void if you do not have a catheter

## 2019-11-12 NOTE — OP NOTE
[facility]  Operative Note    Date: 11/12/2019    Pre-Op Diagnosis:   XENA, ISD  Patient Active Problem List    Diagnosis Date Noted    XENA (stress urinary incontinence, female) 11/12/2019    Arthritis of carpometacarpal (CMC) joint of right thumb 08/20/2019    Lumbar spondylosis 06/28/2019    Left arm pain 03/22/2017    Arm paresthesia, left 03/22/2017    Peripheral polyneuropathy 06/23/2016    Neuralgia and neuritis 06/23/2016    Pain in both feet 06/23/2016    Hypertension       Post-Op Diagnosis: same    Procedure(s) Performed:   1.  Cystoscopy with transurethral macroplastique injection    Specimen(s): none    Staff Surgeon: Shannan Jalloh MD    Assistant Surgeon: Clyde Mcmillan MD    Anesthesia: Monitored Local Anesthesia with Sedation    Indications: Brittany Park is a 59 y.o. female with stress incontinence demonstrated on UDS with ISD. She presents today for macroplastique injection.      Findings: Good coaptation of the urethra.  2.5 ml used.      Estimated Blood Loss: min    Drains: none    Procedure in Detail: After informed consent was obtained, the patient was taken to the cystoscopy suite and placed in the supine position. SCDs were applied and working. Anesthesia was administered. Once the patient was adequately sedated she was placed in dorsal lithotomy position and prepped and draped in the usual sterile fashion. Preoperative timeout was performed, and preoperative antibiotics were confirmed.     An injection cystoscope in a 22 Fr injection sheath was inserted into the urethra and advanced into the urinary bladder. Formal cystoscopy revealed normal bladder mucosa. The ureteral orifices were in the normal anatomic position bilaterally, effluxing clear urine. The Uroplasty rigid needle was inserted into the scope and the scope was backed out into the urethra. Marcroplastique was injected at the 5 and 7 o'clock regions transurethrally. Good coaptation of the urethra  was seen after injection. The scope was removed from the bladder, and there was no leakage of urine from urethra with crede maneuver.    The patient tolerated the procedure well and was transferred to recovery in stable condition.      Plan:  The patient was given prescriptions for tramadol. She will follow up with Dr. Jalloh in 2 weeks.      MD RAMO Miller was present for the entire case and performed the injection.  Agree with the above note.

## 2019-11-12 NOTE — TRANSFER OF CARE
"Anesthesia Transfer of Care Note    Patient: Brittany Park    Procedure(s) Performed: Procedure(s) (LRB):  CYSTOSCOPY, WITH PERIURETHRAL BULKING AGENT INJECTION MACROPLASTIQUE (N/A)    Patient location: PACU    Anesthesia Type: general    Transport from OR: Transported from OR on 6-10 L/min O2 by face mask with adequate spontaneous ventilation    Post pain: adequate analgesia    Post assessment: no apparent anesthetic complications and tolerated procedure well    Post vital signs: stable    Level of consciousness: awake    Nausea/Vomiting: no nausea/vomiting    Complications: none    Transfer of care protocol was followed      Last vitals:11/12/19 1227   Visit Vitals  /54   Pulse 60   Temp 97.3   Resp 12   Ht 5' 2" (1.575 m)   Wt 124.7 kg (275 lb)   LMP 11/20/1998 (Exact Date)   SpO2 97%   Breastfeeding? No   BMI 50.30 kg/m²     "

## 2019-11-12 NOTE — ANESTHESIA POSTPROCEDURE EVALUATION
Anesthesia Post Evaluation    Patient: Brittany Park    Procedure(s) Performed: Procedure(s) (LRB):  CYSTOSCOPY, WITH PERIURETHRAL BULKING AGENT INJECTION MACROPLASTIQUE (N/A)    Final Anesthesia Type: general  Patient location during evaluation: PACU  Patient participation: Yes- Able to Participate  Level of consciousness: awake and alert and oriented  Post-procedure vital signs: reviewed and stable  Pain management: adequate  Airway patency: patent  PONV status at discharge: No PONV  Anesthetic complications: no      Cardiovascular status: blood pressure returned to baseline and hemodynamically stable  Respiratory status: unassisted  Hydration status: euvolemic  Follow-up not needed.          Vitals Value Taken Time   BP 96/47 11/12/2019 12:31 PM   Temp 36.4 °C (97.6 °F) 11/12/2019 10:55 AM   Pulse 59 11/12/2019 12:31 PM   Resp 17 11/12/2019 12:31 PM   SpO2 100 % 11/12/2019 12:31 PM   Vitals shown include unvalidated device data.      No case tracking events are documented in the log.      Pain/Gerardo Score: No data recorded

## 2020-02-03 ENCOUNTER — TELEPHONE (OUTPATIENT)
Dept: UROLOGY | Facility: CLINIC | Age: 60
End: 2020-02-03

## 2020-02-03 ENCOUNTER — OFFICE VISIT (OUTPATIENT)
Dept: UROLOGY | Facility: CLINIC | Age: 60
End: 2020-02-03
Payer: COMMERCIAL

## 2020-02-03 VITALS
WEIGHT: 277 LBS | SYSTOLIC BLOOD PRESSURE: 118 MMHG | HEART RATE: 67 BPM | BODY MASS INDEX: 52.3 KG/M2 | HEIGHT: 61 IN | DIASTOLIC BLOOD PRESSURE: 63 MMHG

## 2020-02-03 DIAGNOSIS — N39.3 SUI (STRESS URINARY INCONTINENCE, FEMALE): Primary | ICD-10-CM

## 2020-02-03 PROCEDURE — 99999 PR PBB SHADOW E&M-EST. PATIENT-LVL IV: CPT | Mod: PBBFAC,,, | Performed by: UROLOGY

## 2020-02-03 PROCEDURE — 99213 OFFICE O/P EST LOW 20 MIN: CPT | Mod: 25,S$GLB,, | Performed by: UROLOGY

## 2020-02-03 PROCEDURE — 3078F DIAST BP <80 MM HG: CPT | Mod: CPTII,S$GLB,, | Performed by: UROLOGY

## 2020-02-03 PROCEDURE — 3008F PR BODY MASS INDEX (BMI) DOCUMENTED: ICD-10-PCS | Mod: CPTII,S$GLB,, | Performed by: UROLOGY

## 2020-02-03 PROCEDURE — 3074F SYST BP LT 130 MM HG: CPT | Mod: CPTII,S$GLB,, | Performed by: UROLOGY

## 2020-02-03 PROCEDURE — 81002 URINALYSIS NONAUTO W/O SCOPE: CPT | Mod: S$GLB,,, | Performed by: UROLOGY

## 2020-02-03 PROCEDURE — 99999 PR PBB SHADOW E&M-EST. PATIENT-LVL IV: ICD-10-PCS | Mod: PBBFAC,,, | Performed by: UROLOGY

## 2020-02-03 PROCEDURE — 99213 PR OFFICE/OUTPT VISIT, EST, LEVL III, 20-29 MIN: ICD-10-PCS | Mod: 25,S$GLB,, | Performed by: UROLOGY

## 2020-02-03 PROCEDURE — 3078F PR MOST RECENT DIASTOLIC BLOOD PRESSURE < 80 MM HG: ICD-10-PCS | Mod: CPTII,S$GLB,, | Performed by: UROLOGY

## 2020-02-03 PROCEDURE — 3008F BODY MASS INDEX DOCD: CPT | Mod: CPTII,S$GLB,, | Performed by: UROLOGY

## 2020-02-03 PROCEDURE — 3074F PR MOST RECENT SYSTOLIC BLOOD PRESSURE < 130 MM HG: ICD-10-PCS | Mod: CPTII,S$GLB,, | Performed by: UROLOGY

## 2020-02-03 PROCEDURE — 81002 PR URINALYSIS NONAUTO W/O SCOPE: ICD-10-PCS | Mod: S$GLB,,, | Performed by: UROLOGY

## 2020-02-03 RX ORDER — METOPROLOL TARTRATE 50 MG/1
TABLET ORAL
COMMUNITY
End: 2020-02-10

## 2020-02-03 RX ORDER — CLINDAMYCIN PHOSPHATE 11.9 MG/ML
SOLUTION TOPICAL
COMMUNITY
Start: 2020-01-23 | End: 2020-02-10

## 2020-02-03 RX ORDER — FLUOXETINE HYDROCHLORIDE 20 MG/1
CAPSULE ORAL
COMMUNITY
End: 2020-09-22

## 2020-02-03 RX ORDER — FUROSEMIDE 20 MG/1
TABLET ORAL
COMMUNITY
End: 2020-02-10

## 2020-02-03 RX ORDER — SPIRONOLACTONE 25 MG/1
TABLET ORAL
COMMUNITY
End: 2020-02-10

## 2020-02-03 RX ORDER — DIPHENOXYLATE HYDROCHLORIDE AND ATROPINE SULFATE 2.5; .025 MG/1; MG/1
TABLET ORAL
COMMUNITY
End: 2021-03-03

## 2020-02-03 RX ORDER — GABAPENTIN 300 MG/1
CAPSULE ORAL
COMMUNITY
End: 2020-02-10

## 2020-02-03 RX ORDER — MUPIROCIN 20 MG/G
OINTMENT TOPICAL
COMMUNITY
End: 2020-02-10

## 2020-02-03 RX ORDER — MELOXICAM 7.5 MG/1
7.5 TABLET ORAL EVERY MORNING
COMMUNITY
End: 2021-03-03

## 2020-02-03 RX ORDER — NYSTATIN 100000 U/G
CREAM TOPICAL
COMMUNITY
End: 2020-02-10

## 2020-02-03 RX ORDER — METFORMIN HYDROCHLORIDE 500 MG/1
TABLET ORAL
COMMUNITY
End: 2020-02-10

## 2020-02-03 RX ORDER — FLUOXETINE 10 MG/1
CAPSULE ORAL
COMMUNITY
End: 2020-02-10

## 2020-02-03 RX ORDER — ATORVASTATIN CALCIUM 40 MG/1
TABLET, FILM COATED ORAL
COMMUNITY
End: 2020-02-10

## 2020-02-03 RX ORDER — PREDNISONE 20 MG/1
TABLET ORAL
COMMUNITY
End: 2020-02-10 | Stop reason: ALTCHOICE

## 2020-02-03 RX ORDER — ALPRAZOLAM 0.25 MG/1
TABLET ORAL
COMMUNITY
End: 2020-02-10

## 2020-02-03 RX ORDER — CLOTRIMAZOLE AND BETAMETHASONE DIPROPIONATE 10; .64 MG/G; MG/G
CREAM TOPICAL
COMMUNITY
End: 2020-09-22

## 2020-02-03 RX ORDER — OMEPRAZOLE 40 MG/1
CAPSULE, DELAYED RELEASE ORAL
COMMUNITY
End: 2020-02-10

## 2020-02-03 RX ORDER — DARIFENACIN 7.5 MG/1
TABLET, EXTENDED RELEASE ORAL
COMMUNITY
End: 2020-09-22

## 2020-02-03 NOTE — PROGRESS NOTES
CHIEF COMPLAINT:    Mrs. Park is a 59 y.o. female presenting for a follow up after Macroplastique injection on 11/12/2019    PRESENTING ILLNESS:    Brittany Park is a 59 y.o. female who returns for follow up.  She states she is doing well.  She no longer has stress incontinence.  She wears a panty liner and only for hygiene.  One lasts the entire day.  No urinary tract infections.  She had an appointment on Thursday, but she was with her grand daughter and she checked to see if she could be seen earlier, as she live 2 hours away.     REVIEW OF SYSTEMS:    Review of Systems   Constitutional: Negative.    HENT: Negative.    Eyes: Negative.    Respiratory: Negative.    Cardiovascular: Negative.    Gastrointestinal: Negative.    Musculoskeletal: Positive for back pain and joint pain.   Skin: Negative.    Neurological: Negative.    Endo/Heme/Allergies: Negative.    Psychiatric/Behavioral: Negative.        PATIENT HISTORY:    Past Medical History:   Diagnosis Date    Acid reflux     Anxiety     Edema     General anesthetics causing adverse effect in therapeutic use     slow to awaken    Hypertension     FER on CPAP     Other and unspecified hyperlipidemia     Prediabetes     Urinary incontinence        Past Surgical History:   Procedure Laterality Date    ADENOIDECTOMY      APPENDECTOMY      CARPAL TUNNEL RELEASE Bilateral     right 20 years ago; left 10/2015    CHOLECYSTECTOMY      COLONOSCOPY      CYSTOSCOPY N/A 1/8/2019    Procedure: CYSTOSCOPY;  Surgeon: Shannan Jalloh MD;  Location: [facility] OR [address] [place] [address];  Service: Urology;  Laterality: N/A;  30 min    CYSTOSCOPY WITH INJECTION OF PERIURETHRAL BULKING AGENT N/A 1/8/2019    Procedure: CYSTOSCOPY, WITH PERIURETHRAL BULKING AGENT INJECTION MACROPLASTIQUE;  Surgeon: Shannan Jalloh MD;  Location: [address];  Service: Urology;  Laterality: N/A;    CYSTOSCOPY WITH INJECTION OF PERIURETHRAL BULKING AGENT N/A 11/12/2019    Procedure:  CYSTOSCOPY, WITH PERIURETHRAL BULKING AGENT INJECTION MACROPLASTIQUE;  Surgeon: Shannan Jalloh MD;  Location: [address];  Service: Urology;  Laterality: N/A;  45 MIN    HYSTERECTOMY      BRETT/BSO    INJECTION OF ANESTHETIC AGENT AROUND MEDIAL BRANCH NERVES INNERVATING LUMBAR FACET JOINT Bilateral 6/28/2019    Procedure: LUMBAR MEDIAL BRANCH NERVE BLOCK (L3,4,5);  Surgeon: Wiliam Keen Jr., MD;  Location: [facility];  Service: Pain Management;  Laterality: Bilateral;    INJECTION OF ANESTHETIC AGENT AROUND MEDIAL BRANCH NERVES INNERVATING LUMBAR FACET JOINT Bilateral 7/19/2019    Procedure: LUMBAR MEDIAL BRANCH NERVE BLOCK (L3,4,5);  Surgeon: Wiliam Keen Jr., MD;  Location: [facility];  Service: Pain Management;  Laterality: Bilateral;    INJECTION OF ANESTHETIC AGENT AROUND MEDIAL BRANCH NERVES INNERVATING LUMBAR FACET JOINT Bilateral 8/23/2019    Procedure: LUMBAR MEDIAL BRANCH NERVE BLOCK (L3,4,5);  Surgeon: Wiliam Keen Jr., MD;  Location: [facility];  Service: Pain Management;  Laterality: Bilateral;    OOPHORECTOMY      TONSILLECTOMY      TRIGGER FINGER RELEASE Right 6/25/2018    Procedure: RELEASE, TRIGGER FINGER - RIGHT RING FINGER;  Surgeon: Violette Broussard MD;  Location: [facility];  Service: Orthopedics;  Laterality: Right;  Stretcher; Supine; Hand Pan 1 & Pan 2    ULNAR NERVE REPAIR      left side    ulner nerve      left side    UPPER GASTROINTESTINAL ENDOSCOPY         Family History   Problem Relation Age of Onset    Stroke Father     Diabetes Mother     Stroke Mother     Diabetes Brother      Socioeconomic History    Marital status:    Tobacco Use    Smoking status: Former Smoker    Smokeless tobacco: Never Used    Tobacco comment: quit 20 years ago   Substance and Sexual Activity    Alcohol use: No    Drug use: No    Sexual activity: Not Currently     Birth control/protection: Surgical     Comment:        Allergies:  Morphine and Latex,  natural rubber    Medications:  Outpatient Encounter Medications as of 2/3/2020   Medication Sig Dispense Refill    alprazolam (XANAX) 0.25 MG tablet Take 0.25 mg by mouth 2 (two) times daily as needed for Anxiety.      ascorbic acid, vitamin C, (VITAMIN C) 1000 MG tablet Take 1,000 mg by mouth once daily.       aspirin (ECOTRIN) 81 MG EC tablet Take 81 mg by mouth once daily.      atorvastatin (LIPITOR) 40 MG tablet Take 40 mg by mouth once daily.      CALCIUM CARBONATE/VITAMIN D3 (CALCIUM WITH VITAMIN D ORAL) Take 1 tablet by mouth 2 (two) times daily.      cetirizine (ZYRTEC) 10 mg Cap Zyrtec   10 mg 1 tab daily      clindamycin (CLEOCIN T) 1 % external solution       clotrimazole-betamethasone 1-0.05% (LOTRISONE) cream clotrimazole-betamethasone 1 %-0.05 % topical cream      CONTRAVE 8-90 mg TbSR Take 2 tablets by mouth 2 (two) times daily.  0    darifenacin (ENABLEX) 7.5 MG Tb24 darifenacin er 7.5 mg tb24      diclofenac-benzalkonium chlor 1-0.13 % ktgt diclofenac 1 % topical gel   APPLY TO AFFECTED AREAS UP TO 4 TIMES DAILY AS NEEDED      diphenoxylate-atropine 2.5-0.025 mg (LOMOTIL) 2.5-0.025 mg per tablet diphenoxylate-atropine 2.5 mg-0.025 mg tablet      fish oil-omega-3 fatty acids 300-1,000 mg capsule Take by mouth once daily.      FLUoxetine (PROZAC) 20 MG capsule fluoxetine hcl 10 mg caps      FLUoxetine 10 MG capsule fluoxetine 10 mg capsule      furosemide (LASIX) 20 MG tablet furosemide 20 mg tabs      gabapentin (NEURONTIN) 800 MG tablet Take 1 tablet (800 mg total) by mouth 3 (three) times daily. 90 tablet 11    lidocaine-priloc-lidocaine HCl 2.5-2.5-3.88 % Crea lido/prilocn cre 2.5-2.5%      loratadine 10 mg Cap loratadine 10 mg capsule   1 daily      meloxicam (MOBIC) 7.5 MG tablet Take 7.5 mg by mouth once daily.       metFORMIN (GLUCOPHAGE) 500 MG tablet metformin hcl 500 mg tabs      metoprolol tartrate (LOPRESSOR) 50 MG tablet metoprolol tartrate 50 mg tabs       multivitamin (ONE DAILY MULTIVITAMIN) per tablet Take 1 tablet by mouth once daily.      multivitamin Tab multivitamin tablet   1 daily      mupirocin (BACTROBAN) 2 % ointment mupirocin 2 % topical ointment      nystatin (MYCOSTATIN) cream nystatin 100,000 unit/gram topical cream   APPLY TO THE AFFECTED AREA TOPICALLY 2 TIMES DAILY      omeprazole (PRILOSEC) 40 MG capsule omeprazole 40 mg capsule,delayed release      pantoprazole (PROTONIX) 40 MG tablet Take 40 mg by mouth once daily.       predniSONE (DELTASONE) 20 MG tablet prednisone 20 mg tablet      promethazine HCl (PROMETHAZINE ORAL) promethazine syp dm      spironolactone (ALDACTONE) 25 MG tablet Take 25 mg by mouth once daily.      traMADol (ULTRAM) 50 mg tablet Take 1 tablet (50 mg total) by mouth every 6 (six) hours as needed. 5 tablet 0    UNKNOWN TO PATIENT Apply 1 application topically once daily. Cyclobenzaprine/Lidocaine      varicella-zoster [id number]-AS01B, PF, (SHINGRIX, PF,) 50 mcg/0.5 mL injection Shingrix (PF) 50 mcg/0.5 mL intramuscular suspension, kit   INJECT AS DIRECTED      ALPRAZolam (XANAX) 0.25 MG tablet alprazolam 0.25 mg tabs      atorvastatin (LIPITOR) 40 MG tablet atorvastatin calcium 40 mg tabs      cetirizine (ZYRTEC) 10 MG tablet Take 10 mg by mouth once daily.      DULoxetine (CYMBALTA) 30 MG capsule Take 2 capsules (60 mg total) by mouth once daily. (Patient taking differently: Take 30 mg by mouth 2 (two) times daily. ) 60 capsule 11    furosemide (LASIX) 20 MG tablet Take 40 mg by mouth 2 (two) times daily.       gabapentin (NEURONTIN) 300 MG capsule gabapentin 300 mg caps      meloxicam (MOBIC) 7.5 MG tablet meloxicam 7.5 mg tabs      metformin (GLUCOPHAGE) 500 MG tablet Take 250 mg by mouth daily with breakfast.       metoprolol tartrate (LOPRESSOR) 50 MG tablet Take 50 mg by mouth 2 (two) times daily.       spironolactone (ALDACTONE) 25 MG tablet spironolactone 25 mg tabs       No facility-administered  encounter medications on file as of 2/3/2020.          PHYSICAL EXAMINATION:    The patient generally appears in good health, is appropriately interactive, and is in no apparent distress.    Skin: No lesions.    Mental: Cooperative with normal affect.    Neuro: Grossly intact.    HEENT: Normal. No evidence of lymphadenopathy.    Chest:  normal inspiratory effort.    Abdomen:  Soft, non-tender. No masses or organomegaly. Bladder is not palpable. No evidence of flank discomfort. No evidence of inguinal hernia.    Extremities: No clubbing, cyanosis, or edema      LABS:    Lab Results   Component Value Date    BUN 14 11/12/2019    CREATININE 0.7 11/12/2019     UA 1.020, pH 5, tr blood, otherwise, negative    IMPRESSION:    Encounter Diagnoses   Name Primary?    XENA (stress urinary incontinence, female) Yes       PLAN:    1.  Follow up as needed

## 2020-02-10 ENCOUNTER — IMMUNIZATION (OUTPATIENT)
Dept: PHARMACY | Facility: CLINIC | Age: 60
End: 2020-02-10
Payer: COMMERCIAL

## 2020-02-10 ENCOUNTER — OFFICE VISIT (OUTPATIENT)
Dept: OBSTETRICS AND GYNECOLOGY | Facility: CLINIC | Age: 60
End: 2020-02-10
Payer: COMMERCIAL

## 2020-02-10 VITALS
SYSTOLIC BLOOD PRESSURE: 118 MMHG | BODY MASS INDEX: 53.17 KG/M2 | HEIGHT: 61 IN | RESPIRATION RATE: 20 BRPM | DIASTOLIC BLOOD PRESSURE: 80 MMHG | HEART RATE: 80 BPM | WEIGHT: 281.63 LBS

## 2020-02-10 DIAGNOSIS — Z01.419 WELL WOMAN EXAM WITH ROUTINE GYNECOLOGICAL EXAM: Primary | ICD-10-CM

## 2020-02-10 DIAGNOSIS — N95.1 MENOPAUSAL STATE: ICD-10-CM

## 2020-02-10 PROCEDURE — 99396 PR PREVENTIVE VISIT,EST,40-64: ICD-10-PCS | Mod: S$GLB,,, | Performed by: OBSTETRICS & GYNECOLOGY

## 2020-02-10 PROCEDURE — 99999 PR PBB SHADOW E&M-EST. PATIENT-LVL III: CPT | Mod: PBBFAC,,, | Performed by: OBSTETRICS & GYNECOLOGY

## 2020-02-10 PROCEDURE — 99396 PREV VISIT EST AGE 40-64: CPT | Mod: S$GLB,,, | Performed by: OBSTETRICS & GYNECOLOGY

## 2020-02-10 PROCEDURE — 3074F SYST BP LT 130 MM HG: CPT | Mod: CPTII,S$GLB,, | Performed by: OBSTETRICS & GYNECOLOGY

## 2020-02-10 PROCEDURE — 3079F DIAST BP 80-89 MM HG: CPT | Mod: CPTII,S$GLB,, | Performed by: OBSTETRICS & GYNECOLOGY

## 2020-02-10 PROCEDURE — 3074F PR MOST RECENT SYSTOLIC BLOOD PRESSURE < 130 MM HG: ICD-10-PCS | Mod: CPTII,S$GLB,, | Performed by: OBSTETRICS & GYNECOLOGY

## 2020-02-10 PROCEDURE — 3079F PR MOST RECENT DIASTOLIC BLOOD PRESSURE 80-89 MM HG: ICD-10-PCS | Mod: CPTII,S$GLB,, | Performed by: OBSTETRICS & GYNECOLOGY

## 2020-02-10 PROCEDURE — 99999 PR PBB SHADOW E&M-EST. PATIENT-LVL III: ICD-10-PCS | Mod: PBBFAC,,, | Performed by: OBSTETRICS & GYNECOLOGY

## 2020-02-10 NOTE — PROGRESS NOTES
Subjective:    Patient ID: Brittany Park is a 59 y.o. female.     Chief Complaint: Annual Well Woman Exam     History of Present Illness:  Brittany presents today for Annual Well Woman exam. .Patient's last menstrual period was 1998 (exact date).. She is currently using no hormone therapy and she reports no problems with hot flashes, night sweats, irritability and vaginal dryness. She denies breast tenderness, denies masses, denies nipple discharge. She denies difficulty with urination since having Collagen Injections from Dr. Jalloh. Bowel movements have not significantly changed.    Menstrual History:   Patient's last menstrual period was 1998 (exact date).    OB History        3    Para   3    Term   3            AB        Living   3       SAB        TAB        Ectopic        Multiple        Live Births                     Review of Systems   Constitutional: Negative for activity change, appetite change, chills, diaphoresis, fatigue, fever and unexpected weight change.   HENT: Negative for mouth sores and tinnitus.    Eyes: Negative for discharge and visual disturbance.   Respiratory: Negative for cough, shortness of breath and wheezing.    Cardiovascular: Negative for chest pain, palpitations and leg swelling.   Gastrointestinal: Negative for abdominal pain, blood in stool, constipation, diarrhea, nausea and vomiting.   Endocrine: Positive for diabetes. Negative for hair loss, hot flashes, hyperthyroidism and hypothyroidism.   Genitourinary: Negative for decreased libido, dyspareunia, dysuria, flank pain, frequency, genital sores, hematuria, menorrhagia, menstrual problem, pelvic pain, urgency, vaginal bleeding, vaginal discharge, vaginal pain, urinary incontinence, postcoital bleeding and vaginal odor.   Musculoskeletal: Positive for back pain. Negative for joint swelling and myalgias.   Integumentary:  Negative for rash, acne, hair changes and nipple discharge.   Neurological:  Negative for seizures, syncope, numbness and headaches.   Hematological: Negative for adenopathy. Does not bruise/bleed easily.   Psychiatric/Behavioral: Negative for sleep disturbance. The patient is nervous/anxious.    Breast: Negative for mastodynia and nipple discharge        Objective:    Vital Signs:  Vitals:    02/10/20 0908   BP: 118/80   Pulse: 80   Resp: 20       Physical Exam:  General:  alert,normal appearing gravid female   Skin:  Skin color, texture, turgor normal. No rashes or lesions   HEENT:  conjunctivae/corneas clear. PERRL.   Neck: supple, trachea midline, no adenopathy or thyromegally   Respiratory:  clear to auscultation bilaterally   Heart:  regular rate and rhythm, S1, S2 normal, no murmur, click, rub or gallop   Breasts:  Nipples are protruding and have no nipple discharge. No palpable masses, erythema, skin changes, tenderness, or adenopathy.   Abdomen:  soft, non-tender. Bowel sounds normal. No masses,  no organomegaly   Pelvis: External genitalia: normal general appearance  Urinary system: urethral meatus normal, bladder nontender  Vaginal: normal mucosa without prolapse or lesions  Cervix: removed surgically  Uterus: removed surgically  Adnexa: removed surgically   Extremities: Normal ROM; no edema, no cyanosis   Neurologial: Normal strength and tone. No focal numbness or weakness. Reflexes 2+ and equal.   Psychiatric: normal mood, speech, dress, and thought processes         Assessment:      1. Well woman exam with routine gynecological exam    2. Menopausal state          Plan:      Well woman exam with routine gynecological exam    Menopausal state    Other orders  -     Influenza - Quadrivalent (3 years & older)            Health Maintenance and Screening:   Brittany was counseled on A.C.O.G. Pap guidelines and recommendations for yearly pelvic exams in addition to recommendations for yearly mammograms and monthly self breast exams, and adequate calcium and vitamin D in her diet.   In  addition, a lengthy discussion of needed Health Maintenance Screening was done with Brittany. She was counseled that she is overdue for Vaccinations for Shingles and Influenza. She was advised that she may obtain the needed vaccinations from her pharmacy or PCP..

## 2020-02-20 ENCOUNTER — HOSPITAL ENCOUNTER (OUTPATIENT)
Dept: RADIOLOGY | Facility: HOSPITAL | Age: 60
Discharge: HOME | End: 2020-02-20
Attending: OBSTETRICS & GYNECOLOGY
Payer: COMMERCIAL

## 2020-02-20 VITALS — HEIGHT: 61 IN | BODY MASS INDEX: 54.94 KG/M2 | WEIGHT: 291 LBS

## 2020-02-20 DIAGNOSIS — Z12.31 ENCOUNTER FOR SCREENING MAMMOGRAM FOR BREAST CANCER: ICD-10-CM

## 2020-02-20 PROCEDURE — 77067 SCR MAMMO BI INCL CAD: CPT | Mod: TC

## 2020-02-20 PROCEDURE — 77067 SCR MAMMO BI INCL CAD: CPT | Mod: 26,,, | Performed by: RADIOLOGY

## 2020-02-20 PROCEDURE — 77063 BREAST TOMOSYNTHESIS BI: CPT | Mod: 26,,, | Performed by: RADIOLOGY

## 2020-02-20 PROCEDURE — 77067 MAMMO DIGITAL SCREENING BILAT WITH TOMOSYNTHESIS_CAD: ICD-10-PCS | Mod: 26,,, | Performed by: RADIOLOGY

## 2020-02-20 PROCEDURE — 77063 MAMMO DIGITAL SCREENING BILAT WITH TOMOSYNTHESIS_CAD: ICD-10-PCS | Mod: 26,,, | Performed by: RADIOLOGY

## 2020-09-22 ENCOUNTER — OFFICE VISIT (OUTPATIENT)
Dept: NEUROLOGY | Facility: CLINIC | Age: 60
End: 2020-09-22
Payer: COMMERCIAL

## 2020-09-22 VITALS
BODY MASS INDEX: 50.07 KG/M2 | TEMPERATURE: 98 F | HEART RATE: 63 BPM | SYSTOLIC BLOOD PRESSURE: 130 MMHG | RESPIRATION RATE: 16 BRPM | HEIGHT: 61 IN | WEIGHT: 265.19 LBS | DIASTOLIC BLOOD PRESSURE: 86 MMHG

## 2020-09-22 DIAGNOSIS — E66.01 MORBID OBESITY WITH BMI OF 50.0-59.9, ADULT: ICD-10-CM

## 2020-09-22 DIAGNOSIS — R73.01 IMPAIRED FASTING GLUCOSE: ICD-10-CM

## 2020-09-22 DIAGNOSIS — M54.50 LUMBAR PAIN: ICD-10-CM

## 2020-09-22 DIAGNOSIS — G62.9 PERIPHERAL POLYNEUROPATHY: Primary | ICD-10-CM

## 2020-09-22 PROCEDURE — 99999 PR PBB SHADOW E&M-EST. PATIENT-LVL IV: ICD-10-PCS | Mod: PBBFAC,,, | Performed by: PSYCHIATRY & NEUROLOGY

## 2020-09-22 PROCEDURE — 3075F SYST BP GE 130 - 139MM HG: CPT | Mod: CPTII,S$GLB,, | Performed by: PSYCHIATRY & NEUROLOGY

## 2020-09-22 PROCEDURE — 99999 PR PBB SHADOW E&M-EST. PATIENT-LVL IV: CPT | Mod: PBBFAC,,, | Performed by: PSYCHIATRY & NEUROLOGY

## 2020-09-22 PROCEDURE — 3008F BODY MASS INDEX DOCD: CPT | Mod: CPTII,S$GLB,, | Performed by: PSYCHIATRY & NEUROLOGY

## 2020-09-22 PROCEDURE — 3079F PR MOST RECENT DIASTOLIC BLOOD PRESSURE 80-89 MM HG: ICD-10-PCS | Mod: CPTII,S$GLB,, | Performed by: PSYCHIATRY & NEUROLOGY

## 2020-09-22 PROCEDURE — 3075F PR MOST RECENT SYSTOLIC BLOOD PRESS GE 130-139MM HG: ICD-10-PCS | Mod: CPTII,S$GLB,, | Performed by: PSYCHIATRY & NEUROLOGY

## 2020-09-22 PROCEDURE — 3079F DIAST BP 80-89 MM HG: CPT | Mod: CPTII,S$GLB,, | Performed by: PSYCHIATRY & NEUROLOGY

## 2020-09-22 PROCEDURE — 99214 PR OFFICE/OUTPT VISIT, EST, LEVL IV, 30-39 MIN: ICD-10-PCS | Mod: S$GLB,,, | Performed by: PSYCHIATRY & NEUROLOGY

## 2020-09-22 PROCEDURE — 3008F PR BODY MASS INDEX (BMI) DOCUMENTED: ICD-10-PCS | Mod: CPTII,S$GLB,, | Performed by: PSYCHIATRY & NEUROLOGY

## 2020-09-22 PROCEDURE — 99214 OFFICE O/P EST MOD 30 MIN: CPT | Mod: S$GLB,,, | Performed by: PSYCHIATRY & NEUROLOGY

## 2020-09-22 RX ORDER — GABAPENTIN 800 MG/1
800 TABLET ORAL 3 TIMES DAILY
Qty: 90 TABLET | Refills: 3 | Status: SHIPPED | OUTPATIENT
Start: 2020-09-22 | End: 2021-03-15

## 2020-09-22 RX ORDER — CLINDAMYCIN PHOSPHATE 11.9 MG/ML
SOLUTION TOPICAL
COMMUNITY
End: 2020-09-22

## 2020-09-22 RX ORDER — CELECOXIB 200 MG/1
CAPSULE ORAL
COMMUNITY
Start: 2020-08-10 | End: 2020-09-22

## 2020-09-22 RX ORDER — KETOROLAC TROMETHAMINE 30 MG/ML
2 INJECTION, SOLUTION INTRAMUSCULAR; INTRAVENOUS
COMMUNITY
End: 2020-09-22

## 2020-09-22 RX ORDER — HYDROCODONE BITARTRATE AND ACETAMINOPHEN 5; 325 MG/1; MG/1
TABLET ORAL
COMMUNITY
End: 2020-09-22

## 2020-09-22 RX ORDER — DULOXETIN HYDROCHLORIDE 60 MG/1
60 CAPSULE, DELAYED RELEASE ORAL DAILY
Qty: 90 CAPSULE | Refills: 3 | Status: SHIPPED | OUTPATIENT
Start: 2020-09-22 | End: 2021-10-25

## 2020-09-22 RX ORDER — TRAMADOL HYDROCHLORIDE 50 MG/1
50 TABLET ORAL EVERY 8 HOURS PRN
COMMUNITY
Start: 2020-06-15 | End: 2020-09-22

## 2020-09-22 RX ORDER — METHOCARBAMOL 750 MG/1
TABLET, FILM COATED ORAL
COMMUNITY
End: 2020-09-22

## 2020-09-22 RX ORDER — DULOXETIN HYDROCHLORIDE 30 MG/1
30 CAPSULE, DELAYED RELEASE ORAL NIGHTLY
COMMUNITY
Start: 2020-09-14 | End: 2020-09-22

## 2020-09-22 NOTE — PROGRESS NOTES
HPI: Brittany Park is a 60 y.o. female with numbness and tingling in the bottoms of both feet and  left hand tingling. 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 Back pain is worse  Saw pain management back and had Lumbar MBB in 2019  She [facility]- they have done procedure to help back pain.    At night she feels her feet are on fire and burning.     Mood is good per her    Also on Cymbalta per another provider but this dose is 30mg. No longer on prozac.  She does not recall poor tolerance of Cymbalta.     Weight is down one pound since last visit, was higher in the interim per her.     Thumb pain on the right is resolved    Reports good glucose    She reports she had surgery for a torn knee meniscus in May and continues to see Dr Gallegos for knee pain. May be pending knee replacement    Review of Systems   Constitutional: Negative for fever.   HENT: Negative for nosebleeds.    Eyes: Negative for double vision.   Respiratory: Negative for hemoptysis.    Cardiovascular: Negative for leg swelling.   Gastrointestinal: Negative for blood in stool.   Genitourinary: Negative for hematuria.   Musculoskeletal: Negative for falls.   Skin: Negative for rash.   Neurological: Positive for sensory change.   Psychiatric/Behavioral: The patient does not have insomnia.        Exam:  Gen Appearance, well developed/nourished in no apparent distress  CV: 2+ distal pulses with trace edema or swelling  Neuro:  MS: Awake, alert,Sustains attention. She is fully oriented times 4. Recent/remote memory intact, Language is full to spontaneous speech/comprehension. Fund of Knowledge is full  She gives good  insightful history and can repeat instructions well without forgetfulness.   CN: Optic discs are flat with normal vasculature, PERRL, Extraoccular movements and visual fields are full. Normal facial sensation and strength,  Tongue and Palate are midline and strong. Shoulder Shrug symmetric and strong.  Motor: Normal bulk, tone, no abnormal movements. 5/5 strength bilateral upper/lower extremities with 2+ reflexes in the arms and 1+ at the knees and none at the knees   Sensory: symmetric to temp, pin and vibration but reduced in the feet to pin.   Romberg mildly swaying  Cerebellar: Finger-nose,Heal-shin, Rapid alternating movements intact  Gait: Normal stance, no ataxia      Imaging: MRI L spine 3/2016: Very minimal spondylosis of the lower lumbar spine without central canal stenosis or neural foraminal narrowing.    EMG 2016: . Mild Right Carpal Tunnel Syndrome. (Resolution of Left CTS since 7/2015 EMG/NCS is noted)  2. 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 This UE findings is new since 7/2015 EMG/NCS  3. No evidence of ulnar neuropathy at the elbow.     Knee xray 4/2018: There is bilateral medial compartment joint space narrowing with subchondral sclerosis and marginal osteophyte formation.  There is dorsal spurring from the patella.  No fracture or dislocation.  No joint effusion.      Assessment/Plan: Brittany Park is a 60 y.o. female with numbness and tingling in the bottoms of both feet and  left hand tingling. 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 Due to concentration complaints and poor balance after raising gabapentin dose, the dose was reduced to  400mg TID. Her side effects are now resolved, she is tolerating the  medication well and continues being back at 800mg TID dosing with some improved pain control  2. Established with [facility]  ([facility]) for lumbar pain and neuropathy pain as well  -MRI L spine 2016 showed very minimal spondylosis  3. Continues Cymbalta for  Neuropathy per her PCP as well  neuropathic cream PRN.  -Raise dose to 60mg daily (Unless sedation, mood changes or other side effects)  4. Her podiatrist, Dr Posey, had noted some possible tarsal signs and her  flat footed findings.  Note EMG is not fully reliable to rule out tarsal tunnel syndrome conditions, especially with co-existing neuropathy . Patient may benefit from tibial injections per him, but this was never done. She has not benefited from inserts to date or Metanxx  5. She was encouraged to reduce morbid obesity.   6 She will continue to follow fasting glucose with PCP/ now on metformin (Likely cause of neuropathy)      RTC 6 months

## 2021-03-03 ENCOUNTER — OFFICE VISIT (OUTPATIENT)
Dept: ORTHOPEDICS | Facility: CLINIC | Age: 61
End: 2021-03-03
Payer: COMMERCIAL

## 2021-03-03 ENCOUNTER — HOSPITAL ENCOUNTER (OUTPATIENT)
Dept: RADIOLOGY | Facility: HOSPITAL | Age: 61
Discharge: HOME | End: 2021-03-03
Attending: PHYSICIAN ASSISTANT
Payer: COMMERCIAL

## 2021-03-03 VITALS
RESPIRATION RATE: 16 BRPM | HEART RATE: 67 BPM | SYSTOLIC BLOOD PRESSURE: 106 MMHG | BODY MASS INDEX: 52.22 KG/M2 | WEIGHT: 276.56 LBS | DIASTOLIC BLOOD PRESSURE: 64 MMHG | HEIGHT: 61 IN

## 2021-03-03 DIAGNOSIS — M17.0 BILATERAL PRIMARY OSTEOARTHRITIS OF KNEE: ICD-10-CM

## 2021-03-03 DIAGNOSIS — M25.569 KNEE PAIN, UNSPECIFIED CHRONICITY, UNSPECIFIED LATERALITY: Primary | ICD-10-CM

## 2021-03-03 DIAGNOSIS — M25.569 KNEE PAIN, UNSPECIFIED CHRONICITY, UNSPECIFIED LATERALITY: ICD-10-CM

## 2021-03-03 DIAGNOSIS — M17.12 PRIMARY OSTEOARTHRITIS OF LEFT KNEE: Primary | ICD-10-CM

## 2021-03-03 PROCEDURE — 3008F PR BODY MASS INDEX (BMI) DOCUMENTED: ICD-10-PCS | Mod: CPTII,S$GLB,, | Performed by: PHYSICIAN ASSISTANT

## 2021-03-03 PROCEDURE — 73564 X-RAY EXAM KNEE 4 OR MORE: CPT | Mod: TC,50

## 2021-03-03 PROCEDURE — 1125F PR PAIN SEVERITY QUANTIFIED, PAIN PRESENT: ICD-10-PCS | Mod: S$GLB,,, | Performed by: PHYSICIAN ASSISTANT

## 2021-03-03 PROCEDURE — 73564 X-RAY EXAM KNEE 4 OR MORE: CPT | Mod: 26,,, | Performed by: RADIOLOGY

## 2021-03-03 PROCEDURE — 1125F AMNT PAIN NOTED PAIN PRSNT: CPT | Mod: S$GLB,,, | Performed by: PHYSICIAN ASSISTANT

## 2021-03-03 PROCEDURE — 3078F PR MOST RECENT DIASTOLIC BLOOD PRESSURE < 80 MM HG: ICD-10-PCS | Mod: CPTII,S$GLB,, | Performed by: PHYSICIAN ASSISTANT

## 2021-03-03 PROCEDURE — 3008F BODY MASS INDEX DOCD: CPT | Mod: CPTII,S$GLB,, | Performed by: PHYSICIAN ASSISTANT

## 2021-03-03 PROCEDURE — 3074F PR MOST RECENT SYSTOLIC BLOOD PRESSURE < 130 MM HG: ICD-10-PCS | Mod: CPTII,S$GLB,, | Performed by: PHYSICIAN ASSISTANT

## 2021-03-03 PROCEDURE — 99999 PR PBB SHADOW E&M-EST. PATIENT-LVL IV: CPT | Mod: PBBFAC,,, | Performed by: PHYSICIAN ASSISTANT

## 2021-03-03 PROCEDURE — 3074F SYST BP LT 130 MM HG: CPT | Mod: CPTII,S$GLB,, | Performed by: PHYSICIAN ASSISTANT

## 2021-03-03 PROCEDURE — 73564 XR KNEE ORTHO BILAT WITH FLEXION: ICD-10-PCS | Mod: 26,,, | Performed by: RADIOLOGY

## 2021-03-03 PROCEDURE — 99203 PR OFFICE/OUTPT VISIT, NEW, LEVL III, 30-44 MIN: ICD-10-PCS | Mod: S$GLB,,, | Performed by: PHYSICIAN ASSISTANT

## 2021-03-03 PROCEDURE — 3078F DIAST BP <80 MM HG: CPT | Mod: CPTII,S$GLB,, | Performed by: PHYSICIAN ASSISTANT

## 2021-03-03 PROCEDURE — 99203 OFFICE O/P NEW LOW 30 MIN: CPT | Mod: S$GLB,,, | Performed by: PHYSICIAN ASSISTANT

## 2021-03-03 PROCEDURE — 99999 PR PBB SHADOW E&M-EST. PATIENT-LVL IV: ICD-10-PCS | Mod: PBBFAC,,, | Performed by: PHYSICIAN ASSISTANT

## 2021-03-03 RX ORDER — MELOXICAM 15 MG/1
15 TABLET ORAL DAILY
Qty: 30 TABLET | Refills: 0 | Status: SHIPPED | OUTPATIENT
Start: 2021-03-03 | End: 2021-08-05

## 2021-03-23 ENCOUNTER — OFFICE VISIT (OUTPATIENT)
Dept: NEUROLOGY | Facility: CLINIC | Age: 61
End: 2021-03-23
Payer: COMMERCIAL

## 2021-03-23 VITALS
HEART RATE: 62 BPM | BODY MASS INDEX: 51.12 KG/M2 | HEIGHT: 61 IN | SYSTOLIC BLOOD PRESSURE: 120 MMHG | TEMPERATURE: 97 F | WEIGHT: 270.75 LBS | DIASTOLIC BLOOD PRESSURE: 72 MMHG | RESPIRATION RATE: 16 BRPM

## 2021-03-23 DIAGNOSIS — E66.01 MORBID OBESITY WITH BMI OF 50.0-59.9, ADULT: ICD-10-CM

## 2021-03-23 DIAGNOSIS — R73.01 IMPAIRED FASTING GLUCOSE: ICD-10-CM

## 2021-03-23 DIAGNOSIS — G62.9 PERIPHERAL POLYNEUROPATHY: ICD-10-CM

## 2021-03-23 DIAGNOSIS — R25.1 TREMOR: Primary | ICD-10-CM

## 2021-03-23 PROCEDURE — 3078F PR MOST RECENT DIASTOLIC BLOOD PRESSURE < 80 MM HG: ICD-10-PCS | Mod: CPTII,S$GLB,, | Performed by: PSYCHIATRY & NEUROLOGY

## 2021-03-23 PROCEDURE — 3008F PR BODY MASS INDEX (BMI) DOCUMENTED: ICD-10-PCS | Mod: CPTII,S$GLB,, | Performed by: PSYCHIATRY & NEUROLOGY

## 2021-03-23 PROCEDURE — 1125F AMNT PAIN NOTED PAIN PRSNT: CPT | Mod: S$GLB,,, | Performed by: PSYCHIATRY & NEUROLOGY

## 2021-03-23 PROCEDURE — 3008F BODY MASS INDEX DOCD: CPT | Mod: CPTII,S$GLB,, | Performed by: PSYCHIATRY & NEUROLOGY

## 2021-03-23 PROCEDURE — 99214 OFFICE O/P EST MOD 30 MIN: CPT | Mod: S$GLB,,, | Performed by: PSYCHIATRY & NEUROLOGY

## 2021-03-23 PROCEDURE — 3074F PR MOST RECENT SYSTOLIC BLOOD PRESSURE < 130 MM HG: ICD-10-PCS | Mod: CPTII,S$GLB,, | Performed by: PSYCHIATRY & NEUROLOGY

## 2021-03-23 PROCEDURE — 99999 PR PBB SHADOW E&M-EST. PATIENT-LVL IV: ICD-10-PCS | Mod: PBBFAC,,, | Performed by: PSYCHIATRY & NEUROLOGY

## 2021-03-23 PROCEDURE — 99999 PR PBB SHADOW E&M-EST. PATIENT-LVL IV: CPT | Mod: PBBFAC,,, | Performed by: PSYCHIATRY & NEUROLOGY

## 2021-03-23 PROCEDURE — 3074F SYST BP LT 130 MM HG: CPT | Mod: CPTII,S$GLB,, | Performed by: PSYCHIATRY & NEUROLOGY

## 2021-03-23 PROCEDURE — 99214 PR OFFICE/OUTPT VISIT, EST, LEVL IV, 30-39 MIN: ICD-10-PCS | Mod: S$GLB,,, | Performed by: PSYCHIATRY & NEUROLOGY

## 2021-03-23 PROCEDURE — 3078F DIAST BP <80 MM HG: CPT | Mod: CPTII,S$GLB,, | Performed by: PSYCHIATRY & NEUROLOGY

## 2021-03-23 PROCEDURE — 1125F PR PAIN SEVERITY QUANTIFIED, PAIN PRESENT: ICD-10-PCS | Mod: S$GLB,,, | Performed by: PSYCHIATRY & NEUROLOGY

## 2021-11-16 ENCOUNTER — HOSPITAL ENCOUNTER (OUTPATIENT)
Dept: RADIOLOGY | Facility: HOSPITAL | Age: 61
Discharge: HOME | End: 2021-11-16
Attending: PHYSICIAN ASSISTANT
Payer: COMMERCIAL

## 2021-11-16 VITALS — BODY MASS INDEX: 50.98 KG/M2 | HEIGHT: 61 IN | WEIGHT: 270 LBS

## 2021-11-16 DIAGNOSIS — Z12.39 BREAST CANCER SCREENING: ICD-10-CM

## 2021-11-16 PROCEDURE — 77067 SCR MAMMO BI INCL CAD: CPT | Mod: TC

## 2021-11-16 PROCEDURE — 77063 BREAST TOMOSYNTHESIS BI: CPT | Mod: 26,,, | Performed by: RADIOLOGY

## 2021-11-16 PROCEDURE — 77067 SCR MAMMO BI INCL CAD: CPT | Mod: 26,,, | Performed by: RADIOLOGY

## 2021-11-16 PROCEDURE — 77067 MAMMO DIGITAL SCREENING BILAT WITH TOMO: ICD-10-PCS | Mod: 26,,, | Performed by: RADIOLOGY

## 2021-11-16 PROCEDURE — 77063 MAMMO DIGITAL SCREENING BILAT WITH TOMO: ICD-10-PCS | Mod: 26,,, | Performed by: RADIOLOGY

## 2021-12-28 RX ORDER — GABAPENTIN 800 MG/1
TABLET ORAL
Qty: 90 TABLET | Refills: 3 | Status: SHIPPED | OUTPATIENT
Start: 2021-12-28 | End: 2022-04-19

## 2022-11-25 ENCOUNTER — HOSPITAL ENCOUNTER (OUTPATIENT)
Dept: RADIOLOGY | Facility: HOSPITAL | Age: 62
Discharge: HOME | End: 2022-11-25
Attending: PHYSICIAN ASSISTANT
Payer: COMMERCIAL

## 2022-11-25 VITALS — BODY MASS INDEX: 50.98 KG/M2 | WEIGHT: 270 LBS | HEIGHT: 61 IN

## 2022-11-25 DIAGNOSIS — Z12.31 ENCOUNTER FOR SCREENING MAMMOGRAM FOR MALIGNANT NEOPLASM OF BREAST: ICD-10-CM

## 2022-11-25 PROCEDURE — 77063 MAMMO DIGITAL SCREENING BILAT WITH TOMO: ICD-10-PCS | Mod: 26,,, | Performed by: RADIOLOGY

## 2022-11-25 PROCEDURE — 77067 SCR MAMMO BI INCL CAD: CPT | Mod: TC

## 2022-11-25 PROCEDURE — 77067 SCR MAMMO BI INCL CAD: CPT | Mod: 26,,, | Performed by: RADIOLOGY

## 2022-11-25 PROCEDURE — 77063 BREAST TOMOSYNTHESIS BI: CPT | Mod: TC

## 2022-11-25 PROCEDURE — 77063 BREAST TOMOSYNTHESIS BI: CPT | Mod: 26,,, | Performed by: RADIOLOGY

## 2022-11-25 PROCEDURE — 77067 MAMMO DIGITAL SCREENING BILAT WITH TOMO: ICD-10-PCS | Mod: 26,,, | Performed by: RADIOLOGY

## 2023-01-27 ENCOUNTER — OFFICE VISIT (OUTPATIENT)
Dept: UROLOGY | Facility: CLINIC | Age: 63
End: 2023-01-27
Payer: COMMERCIAL

## 2023-01-27 DIAGNOSIS — N39.3 URINARY, INCONTINENCE, STRESS FEMALE: Primary | ICD-10-CM

## 2023-01-27 PROCEDURE — 99999 PR PBB SHADOW E&M-EST. PATIENT-LVL II: CPT | Mod: PBBFAC,,, | Performed by: UROLOGY

## 2023-01-27 PROCEDURE — 81002 PR URINALYSIS NONAUTO W/O SCOPE: ICD-10-PCS | Mod: S$GLB,,, | Performed by: UROLOGY

## 2023-01-27 PROCEDURE — 99999 PR PBB SHADOW E&M-EST. PATIENT-LVL II: ICD-10-PCS | Mod: PBBFAC,,, | Performed by: UROLOGY

## 2023-01-27 PROCEDURE — 51701 INSERT BLADDER CATHETER: CPT | Mod: S$GLB,,, | Performed by: UROLOGY

## 2023-01-27 PROCEDURE — 81002 URINALYSIS NONAUTO W/O SCOPE: CPT | Mod: S$GLB,,, | Performed by: UROLOGY

## 2023-01-27 PROCEDURE — 51701 PR INSERTION OF NON-INDWELLING BLADDER CATHETERIZATION FOR RESIDUAL UR: ICD-10-PCS | Mod: S$GLB,,, | Performed by: UROLOGY

## 2023-01-27 PROCEDURE — 99215 OFFICE O/P EST HI 40 MIN: CPT | Mod: 25,S$GLB,, | Performed by: UROLOGY

## 2023-01-27 PROCEDURE — 99215 PR OFFICE/OUTPT VISIT, EST, LEVL V, 40-54 MIN: ICD-10-PCS | Mod: 25,S$GLB,, | Performed by: UROLOGY

## 2023-01-27 RX ORDER — KETOPROFEN 25 MG/1
CAPSULE ORAL
COMMUNITY

## 2023-01-27 RX ORDER — SEMAGLUTIDE 1.34 MG/ML
0.25 INJECTION, SOLUTION SUBCUTANEOUS
COMMUNITY

## 2023-01-27 RX ORDER — DICLOFENAC SODIUM 10 MG/G
GEL TOPICAL
COMMUNITY
Start: 2022-11-28 | End: 2023-01-30 | Stop reason: SDUPTHER

## 2023-01-27 RX ORDER — DICLOFENAC SODIUM 10 MG/G
GEL TOPICAL
COMMUNITY

## 2023-01-27 NOTE — PROGRESS NOTES
CHIEF COMPLAINT:    Mrs. Park is a 62 y.o. female presenting for a visit for recurrent mixed incontinence    PRESENTING ILLNESS:    Brittany Park is a 62 y.o. female who is presents with a history of stress incontinence with ISD, who is status post injection of macroplastique on 1/8/2019 and 11/12/2019.  She states she has both urge and stress incontinence.  When she leaves the house, she wears a diaper.  The incontinence has worsened over the past 3-4 months.  She denies gross hematuria and recurrent UTI's.  She feels like she empties and this is corroborated by the PVR by bladder scan of 3 ml, today.  She was last seen on 2/3/2020.     No changes in her past medical history or her past surgical history.      REVIEW OF SYSTEMS:    Review of Systems   Constitutional: Negative.    HENT: Negative.     Eyes: Negative.    Respiratory: Negative.     Cardiovascular: Negative.    Gastrointestinal:  Positive for heartburn.   Genitourinary:         Mixed incontinence   Musculoskeletal: Negative.    Skin: Negative.    Neurological: Negative.    Endo/Heme/Allergies:         Prediabetic   Psychiatric/Behavioral: Negative.       PATIENT HISTORY:    Past Medical History:   Diagnosis Date    Acid reflux     Anxiety     Edema     General anesthetics causing adverse effect in therapeutic use     slow to awaken    Hypertension     FER on CPAP     Other and unspecified hyperlipidemia     Prediabetes     Urinary incontinence        Past Surgical History:   Procedure Laterality Date    ADENOIDECTOMY      APPENDECTOMY      CARPAL TUNNEL RELEASE Bilateral     right 20 years ago; left 10/2015    CHOLECYSTECTOMY      COLONOSCOPY      CYSTOSCOPY N/A 1/8/2019    Procedure: CYSTOSCOPY;  Surgeon: Shannan Jalloh MD;  Location: [facility] OR [address] [place] [address];  Service: Urology;  Laterality: N/A;  30 min    CYSTOSCOPY WITH INJECTION OF PERIURETHRAL BULKING AGENT N/A 1/8/2019    Procedure: CYSTOSCOPY, WITH PERIURETHRAL BULKING AGENT INJECTION  MACROPLASTIQUE;  Surgeon: Shannan Jalloh MD;  Location: [facility] OR 1ST FLR;  Service: Urology;  Laterality: N/A;    CYSTOSCOPY WITH INJECTION OF PERIURETHRAL BULKING AGENT N/A 11/12/2019    Procedure: CYSTOSCOPY, WITH PERIURETHRAL BULKING AGENT INJECTION MACROPLASTIQUE;  Surgeon: Shannan Jalloh MD;  Location: [facility] OR 1ST FLR;  Service: Urology;  Laterality: N/A;  45 MIN    HYSTERECTOMY      BRETT/BSO    INJECTION OF ANESTHETIC AGENT AROUND MEDIAL BRANCH NERVES INNERVATING LUMBAR FACET JOINT Bilateral 6/28/2019    Procedure: LUMBAR MEDIAL BRANCH NERVE BLOCK (L3,4,5);  Surgeon: Wiliam Keen Jr., MD;  Location: [facility];  Service: Pain Management;  Laterality: Bilateral;    INJECTION OF ANESTHETIC AGENT AROUND MEDIAL BRANCH NERVES INNERVATING LUMBAR FACET JOINT Bilateral 7/19/2019    Procedure: LUMBAR MEDIAL BRANCH NERVE BLOCK (L3,4,5);  Surgeon: Wiliam Keen Jr., MD;  Location: [facility];  Service: Pain Management;  Laterality: Bilateral;    INJECTION OF ANESTHETIC AGENT AROUND MEDIAL BRANCH NERVES INNERVATING LUMBAR FACET JOINT Bilateral 8/23/2019    Procedure: LUMBAR MEDIAL BRANCH NERVE BLOCK (L3,4,5);  Surgeon: Wiliam Keen Jr., MD;  Location: [facility];  Service: Pain Management;  Laterality: Bilateral;    OOPHORECTOMY      SKIN TAG REMOVAL  01/20/2020    TONSILLECTOMY      TRIGGER FINGER RELEASE Right 6/25/2018    Procedure: RELEASE, TRIGGER FINGER - RIGHT RING FINGER;  Surgeon: Violette Broussard MD;  Location: [facility];  Service: Orthopedics;  Laterality: Right;  Stretcher; Supine; Hand Pan 1 & Pan 2    ULNAR NERVE REPAIR      left side    ulner nerve      left side    UPPER GASTROINTESTINAL ENDOSCOPY         Family History   Problem Relation Age of Onset    Stroke Father     Diabetes Mother     Stroke Mother     Diabetes Brother      Social History     Socioeconomic History    Marital status:    Tobacco Use    Smoking status: Former    Smokeless tobacco: Never    Tobacco comments:      quit 20 years ago   Substance and Sexual Activity    Alcohol use: No    Drug use: No    Sexual activity: Not Currently     Birth control/protection: Surgical     Comment:        Allergies:  Morphine and Latex, natural rubber    Medications:  Outpatient Encounter Medications as of 1/27/2023   Medication Sig Dispense Refill    alprazolam (XANAX) 0.25 MG tablet Take 0.25 mg by mouth 2 (two) times daily as needed for Anxiety.      aspirin (ECOTRIN) 81 MG EC tablet Take 81 mg by mouth every evening.       atorvastatin (LIPITOR) 40 MG tablet Take 40 mg by mouth every evening.       CALCIUM CARBONATE/VITAMIN D3 (CALCIUM WITH VITAMIN D ORAL) Take 2 tablets by mouth every morning.       cetirizine (ZYRTEC) 10 mg Cap Zyrtec   10 mg 1 tab daily      DULoxetine (CYMBALTA) 60 MG capsule TAKE 1 CAPSULE BY MOUTH ONCE DAILY 90 capsule 3    furosemide (LASIX) 20 MG tablet Take 40 mg by mouth 2 (two) times daily.       gabapentin (NEURONTIN) 800 MG tablet TAKE 1 TABLET BY MOUTH THREE TIMES DAILY 90 tablet 3    meloxicam (MOBIC) 15 MG tablet TAKE 1 TABLET BY MOUTH ONCE DAILY. 30 tablet 0    metformin (GLUCOPHAGE) 500 MG tablet Take 250 mg by mouth daily with breakfast.       metoprolol tartrate (LOPRESSOR) 50 MG tablet Take 50 mg by mouth 2 (two) times daily.       multivitamin (ONE DAILY MULTIVITAMIN) per tablet Take 1 tablet by mouth once daily.      pantoprazole (PROTONIX) 40 MG tablet Take 40 mg by mouth once daily.       spironolactone (ALDACTONE) 25 MG tablet Take 25 mg by mouth once daily.       No facility-administered encounter medications on file as of 1/27/2023.         PHYSICAL EXAMINATION:    The patient generally appears in good health, is appropriately interactive, and is in no apparent distress.    Skin: No lesions.    Mental: Cooperative with normal affect.    Neuro: Grossly intact.    HEENT: Normal. No evidence of lymphadenopathy.    Chest:  normal inspiratory effort.    Abdomen: Soft, non-tender. No masses or  organomegaly. Bladder is not palpable. No evidence of flank discomfort. No evidence of inguinal hernia.    Extremities: No clubbing, cyanosis, or edema    Normal external female genitalia  Urethral meatus is normal  Urethra and bladder are nontender to bimanual exam  Well supported anteriorly and posteriorly   Uterus and cervix are normal  No adnexal masses  PVR by catheterization was 80 ml 30 minutes after she had voided.     LABS:    Lab Results   Component Value Date    BUN 14 11/12/2019    CREATININE 0.7 11/12/2019       UA 1.005, pH 5, tr protein, otherwise, negative.     IMPRESSION:    Mixed incontinence    PLAN:    1. Discussed that I am using a new bulking agent and like the performance.  [employer].  Consent signed.   2.  Will treat the stress portion of her symptoms, will see how she does.  May need further treatment for the urge component.  She expressed understanding.     I spent 40 minutes with the patient of which more than half was spent in direct consultation with the patient in regards to our treatment and plan.

## 2023-02-27 ENCOUNTER — TELEPHONE (OUTPATIENT)
Dept: UROLOGY | Facility: CLINIC | Age: 63
End: 2023-02-27
Payer: MEDICARE

## 2023-02-27 NOTE — TELEPHONE ENCOUNTER
----- Message from Maureen Mendoza LPN sent at 2/22/2023  5:12 PM CST -----  Contact: pt    ----- Message -----  From: Giulia Zarate  Sent: 2/22/2023  10:21 AM CST  To: Ye[facility] Campbell Staff    Pt states she was suppose to have someone reach out to her in reference to having a procedure schedule, but has not heard back and would like to know when it can be done.     Confirmed contact below:  Contact Name:Brittany Park  Phone Number: [phone]

## 2023-03-03 ENCOUNTER — PATIENT MESSAGE (OUTPATIENT)
Dept: UROLOGY | Facility: CLINIC | Age: 63
End: 2023-03-03
Payer: MEDICARE

## 2023-03-08 ENCOUNTER — TELEPHONE (OUTPATIENT)
Dept: UROLOGY | Facility: CLINIC | Age: 63
End: 2023-03-08
Payer: MEDICARE

## 2023-03-08 DIAGNOSIS — N39.3 SUI (STRESS URINARY INCONTINENCE, FEMALE): Primary | ICD-10-CM

## 2023-03-27 ENCOUNTER — TELEPHONE (OUTPATIENT)
Dept: UROLOGY | Facility: CLINIC | Age: 63
End: 2023-03-27
Payer: MEDICARE

## 2023-03-27 NOTE — PRE-PROCEDURE INSTRUCTIONS
Pre-op Instructions:  No food,milk or milk products for 8 hours before surgery.  Clear liquids like water,gatorade,apple juice are allowed up until 2 hours before surgery.  Shower instructions as well as directions to the Surgery Center were given.  Encouraged to wear loose fitting,comfortable clothing.  Medication instructions for pm prior to and am of procedure reviewed.  Instructed to avoid taking vitamins,supplements,aspirin and ibuprofen the morning of surgery.    Patient has not stopped taking her aspirin 81 mg nightly.Urgent inbasket sent to     Patient denies any side effects or issues with recent anesthesia or sedation.

## 2023-03-28 ENCOUNTER — ANESTHESIA EVENT (OUTPATIENT)
Dept: SURGERY | Facility: HOSPITAL | Age: 63
End: 2023-03-28
Payer: MEDICARE

## 2023-03-28 ENCOUNTER — ANESTHESIA (OUTPATIENT)
Dept: SURGERY | Facility: HOSPITAL | Age: 63
End: 2023-03-28
Payer: MEDICARE

## 2023-03-28 ENCOUNTER — HOSPITAL ENCOUNTER (OUTPATIENT)
Facility: HOSPITAL | Age: 63
Discharge: HOME | End: 2023-03-28
Attending: UROLOGY | Admitting: UROLOGY
Payer: MEDICARE

## 2023-03-28 VITALS
RESPIRATION RATE: 20 BRPM | DIASTOLIC BLOOD PRESSURE: 91 MMHG | HEART RATE: 70 BPM | SYSTOLIC BLOOD PRESSURE: 141 MMHG | OXYGEN SATURATION: 96 % | TEMPERATURE: 98 F

## 2023-03-28 DIAGNOSIS — N39.46 MIXED STRESS AND URGE URINARY INCONTINENCE: Primary | ICD-10-CM

## 2023-03-28 DIAGNOSIS — N39.3 SUI (STRESS URINARY INCONTINENCE, FEMALE): ICD-10-CM

## 2023-03-28 LAB — POCT GLUCOSE: 72 MG/DL (ref 70–110)

## 2023-03-28 PROCEDURE — 51715 ENDOSCOPIC INJECTION/IMPLANT: CPT | Mod: ,,, | Performed by: UROLOGY

## 2023-03-28 PROCEDURE — D9220A PRA ANESTHESIA: Mod: ANES,,, | Performed by: ANESTHESIOLOGY

## 2023-03-28 PROCEDURE — L8606 SYNTHETIC IMPLNT URINARY 1ML: HCPCS | Performed by: UROLOGY

## 2023-03-28 PROCEDURE — D9220A PRA ANESTHESIA: ICD-10-PCS | Mod: CRNA,,, | Performed by: NURSE ANESTHETIST, CERTIFIED REGISTERED

## 2023-03-28 PROCEDURE — 37000008 HC ANESTHESIA 1ST 15 MINUTES: Performed by: UROLOGY

## 2023-03-28 PROCEDURE — 82962 GLUCOSE BLOOD TEST: CPT | Performed by: UROLOGY

## 2023-03-28 PROCEDURE — 71000015 HC POSTOP RECOV 1ST HR: Performed by: UROLOGY

## 2023-03-28 PROCEDURE — 25000003 PHARM REV CODE 250: Performed by: NURSE ANESTHETIST, CERTIFIED REGISTERED

## 2023-03-28 PROCEDURE — 71000044 HC DOSC ROUTINE RECOVERY FIRST HOUR: Performed by: UROLOGY

## 2023-03-28 PROCEDURE — 37000009 HC ANESTHESIA EA ADD 15 MINS: Performed by: UROLOGY

## 2023-03-28 PROCEDURE — D9220A PRA ANESTHESIA: Mod: CRNA,,, | Performed by: NURSE ANESTHETIST, CERTIFIED REGISTERED

## 2023-03-28 PROCEDURE — 25000003 PHARM REV CODE 250: Performed by: STUDENT IN AN ORGANIZED HEALTH CARE EDUCATION/TRAINING PROGRAM

## 2023-03-28 PROCEDURE — 63600175 PHARM REV CODE 636 W HCPCS: Performed by: NURSE ANESTHETIST, CERTIFIED REGISTERED

## 2023-03-28 PROCEDURE — 36000706: Performed by: UROLOGY

## 2023-03-28 PROCEDURE — D9220A PRA ANESTHESIA: ICD-10-PCS | Mod: ANES,,, | Performed by: ANESTHESIOLOGY

## 2023-03-28 PROCEDURE — 36000707: Performed by: UROLOGY

## 2023-03-28 PROCEDURE — 51715 PR ENDOSCOPIC INJECTION/IMPLANT: ICD-10-PCS | Mod: ,,, | Performed by: UROLOGY

## 2023-03-28 PROCEDURE — 63600175 PHARM REV CODE 636 W HCPCS: Performed by: STUDENT IN AN ORGANIZED HEALTH CARE EDUCATION/TRAINING PROGRAM

## 2023-03-28 RX ORDER — PROPOFOL 10 MG/ML
VIAL (ML) INTRAVENOUS CONTINUOUS PRN
Status: DISCONTINUED | OUTPATIENT
Start: 2023-03-28 | End: 2023-03-28

## 2023-03-28 RX ORDER — SODIUM CHLORIDE 0.9 % (FLUSH) 0.9 %
3 SYRINGE (ML) INJECTION
Status: DISCONTINUED | OUTPATIENT
Start: 2023-03-28 | End: 2023-03-28 | Stop reason: HOSPADM

## 2023-03-28 RX ORDER — LIDOCAINE HYDROCHLORIDE 20 MG/ML
INJECTION INTRAVENOUS
Status: DISCONTINUED | OUTPATIENT
Start: 2023-03-28 | End: 2023-03-28

## 2023-03-28 RX ORDER — ONDANSETRON 2 MG/ML
INJECTION INTRAMUSCULAR; INTRAVENOUS
Status: DISCONTINUED | OUTPATIENT
Start: 2023-03-28 | End: 2023-03-28

## 2023-03-28 RX ORDER — SULFAMETHOXAZOLE AND TRIMETHOPRIM 800; 160 MG/1; MG/1
1 TABLET ORAL 2 TIMES DAILY
Qty: 6 TABLET | Refills: 0 | Status: SHIPPED | OUTPATIENT
Start: 2023-03-28 | End: 2023-03-31

## 2023-03-28 RX ORDER — FENTANYL CITRATE 50 UG/ML
INJECTION, SOLUTION INTRAMUSCULAR; INTRAVENOUS
Status: DISCONTINUED | OUTPATIENT
Start: 2023-03-28 | End: 2023-03-28

## 2023-03-28 RX ORDER — MIDAZOLAM HYDROCHLORIDE 1 MG/ML
INJECTION INTRAMUSCULAR; INTRAVENOUS
Status: DISCONTINUED | OUTPATIENT
Start: 2023-03-28 | End: 2023-03-28

## 2023-03-28 RX ORDER — DEXMEDETOMIDINE HYDROCHLORIDE 100 UG/ML
INJECTION, SOLUTION INTRAVENOUS
Status: DISCONTINUED | OUTPATIENT
Start: 2023-03-28 | End: 2023-03-28

## 2023-03-28 RX ADMIN — MIDAZOLAM HYDROCHLORIDE 2 MG: 1 INJECTION, SOLUTION INTRAMUSCULAR; INTRAVENOUS at 03:03

## 2023-03-28 RX ADMIN — ONDANSETRON 4 MG: 2 INJECTION INTRAMUSCULAR; INTRAVENOUS at 03:03

## 2023-03-28 RX ADMIN — SODIUM CHLORIDE: 0.9 INJECTION, SOLUTION INTRAVENOUS at 03:03

## 2023-03-28 RX ADMIN — FENTANYL CITRATE 50 MCG: 50 INJECTION, SOLUTION INTRAMUSCULAR; INTRAVENOUS at 03:03

## 2023-03-28 RX ADMIN — LIDOCAINE HYDROCHLORIDE 100 MG: 20 INJECTION INTRAVENOUS at 03:03

## 2023-03-28 RX ADMIN — PROPOFOL 100 MCG/KG/MIN: 10 INJECTION, EMULSION INTRAVENOUS at 03:03

## 2023-03-28 RX ADMIN — DEXMEDETOMIDINE HYDROCHLORIDE 20 MCG: 100 INJECTION, SOLUTION INTRAVENOUS at 03:03

## 2023-03-28 RX ADMIN — GLYCOPYRROLATE 0.2 MG: 0.2 INJECTION, SOLUTION INTRAMUSCULAR; INTRAVENOUS at 03:03

## 2023-03-28 RX ADMIN — CEFAZOLIN 3 G: 2 INJECTION, POWDER, FOR SOLUTION INTRAMUSCULAR; INTRAVENOUS at 03:03

## 2023-03-28 NOTE — ANESTHESIA PREPROCEDURE EVALUATION
03/28/2023  Brittany Park is a 63 y.o., female.    [facility]  Anesthesia Pre-Operative Evaluation         Patient Name: Brittany Park  YOB: 1960  MRN: [id number]    SUBJECTIVE:     Pre-operative evaluation for Procedure(s) (LRB):  CYSTOSCOPY, WITH PERIURETHRAL BULKING AGENT INJECTION (N/A)     03/28/2023    Brittany Park is a 63 y.o. female     Patient now presents for the above procedure(s).      LDA:       Prev airway:     Drips:       Patient Active Problem List   Diagnosis    Hypertension    Peripheral polyneuropathy    Neuralgia and neuritis    Pain in both feet    Left arm pain    Arm paresthesia, left    Lumbar spondylosis    Arthritis of carpometacarpal (CMC) joint of right thumb    XENA (stress urinary incontinence, female)       Review of patient's allergies indicates:   Allergen Reactions    Latex, natural rubber Itching and Rash    Morphine Itching and Rash    Oxycodone Itching and Rash       Current Inpatient Medications:      No current facility-administered medications on file prior to encounter.     Current Outpatient Medications on File Prior to Encounter   Medication Sig Dispense Refill    aspirin (ECOTRIN) 81 MG EC tablet Take 81 mg by mouth every evening.       atorvastatin (LIPITOR) 40 MG tablet Take 40 mg by mouth every evening.       cetirizine 10 mg Cap Take by mouth every morning.      DULoxetine (CYMBALTA) 60 MG capsule TAKE 1 CAPSULE BY MOUTH ONCE DAILY (Patient taking differently: Take by mouth every evening.) 90 capsule 3    furosemide (LASIX) 20 MG tablet Take 20 mg by mouth 2 (two) times daily.      gabapentin (NEURONTIN) 800 MG tablet TAKE 1 TABLET BY MOUTH THREE TIMES DAILY 90 tablet 3    metformin (GLUCOPHAGE) 500 MG tablet Take 250 mg by mouth daily with breakfast.       metoprolol tartrate (LOPRESSOR) 50 MG  tablet Take 50 mg by mouth 2 (two) times daily.       pantoprazole (PROTONIX) 40 MG tablet Take 40 mg by mouth every morning.      spironolactone (ALDACTONE) 25 MG tablet Take 25 mg by mouth every morning.      alprazolam (XANAX) 0.25 MG tablet Take 0.25 mg by mouth 2 (two) times daily as needed for Anxiety.      CALCIUM CARBONATE/VITAMIN D3 (CALCIUM WITH VITAMIN D ORAL) Take 2 tablets by mouth every morning.       diclofenac sodium (VOLTAREN) 1 % Gel diclofenac 1 % topical gel   APPLY 4 GRAMS TOPICALLY TO BACK UP TO 4 TIMES DAILY AS NEEDED      ketoprofen 25 mg Cap PT STATES THIS IS A CREAM FOR HER FEET C/O NEUROPATHY,NOT A CAPSULE      semaglutide (OZEMPIC) 0.25 mg or 0.5 mg(2 mg/1.5 mL) pen injector Inject 0.25 mg into the skin every 7 days. WEDNESDAYS         Past Surgical History:   Procedure Laterality Date    ADENOIDECTOMY      APPENDECTOMY      CARPAL TUNNEL RELEASE Bilateral     right 20 years ago; left 10/2015    CHOLECYSTECTOMY      COLONOSCOPY      CYSTOSCOPY N/A 1/8/2019    Procedure: CYSTOSCOPY;  Surgeon: Shannan Jalloh MD;  Location: [address];  Service: Urology;  Laterality: N/A;  30 min    CYSTOSCOPY WITH INJECTION OF PERIURETHRAL BULKING AGENT N/A 1/8/2019    Procedure: CYSTOSCOPY, WITH PERIURETHRAL BULKING AGENT INJECTION MACROPLASTIQUE;  Surgeon: Shannan Jalloh MD;  Location: [address];  Service: Urology;  Laterality: N/A;    CYSTOSCOPY WITH INJECTION OF PERIURETHRAL BULKING AGENT N/A 11/12/2019    Procedure: CYSTOSCOPY, WITH PERIURETHRAL BULKING AGENT INJECTION MACROPLASTIQUE;  Surgeon: Shannan Jalloh MD;  Location: [address];  Service: Urology;  Laterality: N/A;  45 MIN    HYSTERECTOMY      BRETT/BSO    INJECTION OF ANESTHETIC AGENT AROUND MEDIAL BRANCH NERVES INNERVATING LUMBAR FACET JOINT Bilateral 6/28/2019    Procedure: LUMBAR MEDIAL BRANCH NERVE BLOCK (L3,4,5);  Surgeon: Wiliam Keen Jr., MD;  Location: [facility];  Service: Pain Management;   Laterality: Bilateral;    INJECTION OF ANESTHETIC AGENT AROUND MEDIAL BRANCH NERVES INNERVATING LUMBAR FACET JOINT Bilateral 7/19/2019    Procedure: LUMBAR MEDIAL BRANCH NERVE BLOCK (L3,4,5);  Surgeon: Wiliam Keen Jr., MD;  Location: [facility];  Service: Pain Management;  Laterality: Bilateral;    INJECTION OF ANESTHETIC AGENT AROUND MEDIAL BRANCH NERVES INNERVATING LUMBAR FACET JOINT Bilateral 8/23/2019    Procedure: LUMBAR MEDIAL BRANCH NERVE BLOCK (L3,4,5);  Surgeon: Wiliam Keen Jr., MD;  Location: [facility] OR;  Service: Pain Management;  Laterality: Bilateral;    OOPHORECTOMY      SKIN TAG REMOVAL  01/20/2020    TONSILLECTOMY      TRIGGER FINGER RELEASE Right 6/25/2018    Procedure: RELEASE, TRIGGER FINGER - RIGHT RING FINGER;  Surgeon: Violette Broussard MD;  Location: [facility];  Service: Orthopedics;  Laterality: Right;  Stretcher; Supine; Hand Pan 1 & Pan 2    ULNAR NERVE REPAIR      left side    ulner nerve      left side    UPPER GASTROINTESTINAL ENDOSCOPY         Social History     Socioeconomic History    Marital status:    Tobacco Use    Smoking status: Former    Smokeless tobacco: Never    Tobacco comments:     quit 20 years ago   Substance and Sexual Activity    Alcohol use: No    Drug use: No    Sexual activity: Not Currently     Birth control/protection: Surgical     Comment:        OBJECTIVE:     Vital Signs Range (Last 24H):         Significant Labs:  Lab Results   Component Value Date    WBC 10.24 06/25/2018    HGB 14.3 06/25/2018    HCT 42.6 06/25/2018     06/25/2018     11/12/2019    K 4.1 11/12/2019     11/12/2019    CREATININE 0.7 11/12/2019    BUN 14 11/12/2019    CO2 31 (H) 11/12/2019       Diagnostic Studies: No relevant studies.    EKG:   Results for orders placed or performed during the hospital encounter of 11/12/19   EKG 12-lead    Collection Time: 11/12/19 11:27 AM    Narrative    Test Reason : Z01.818,    Vent. Rate : 054  BPM     Atrial Rate : 054 BPM     P-R Int : 156 ms          QRS Dur : 128 ms      QT Int : 516 ms       P-R-T Axes : 028 -34 021 degrees     QTc Int : 489 ms    Sinus bradycardia  Left axis deviation  Right bundle branch block  Abnormal ECG  When compared with ECG of 29-MAR-2010 12:38,  Right bundle branch block is now Present  Confirmed by REFUGIO SILVA MD ([id number]) on 11/13/2019 6:16:03 AM    Referred By: PETER ORTIZ           Confirmed By:REFUGIO SILVA MD       2D ECHO:  TTE:  No results found for this or any previous visit.    STEPHAN:  No results found for this or any previous visit.    ASSESSMENT/PLAN:       Pre-op Assessment    I have reviewed the Patient Summary Reports.       I have reviewed the Medications.     Review of Systems  Anesthesia Hx:  No problems with previous Anesthesia  History of prior surgery of interest to airway management or planning: Previous anesthesia: General   Social:  Former Smoker, No Alcohol Use    Hematology/Oncology:  Hematology Normal   Oncology Normal     EENT/Dental:EENT/Dental Normal   Cardiovascular:   Exercise tolerance: poor Hypertension    Pulmonary:   Sleep Apnea    Renal/:  Renal/ Normal     Hepatic/GI:   GERD, well controlled    Musculoskeletal:   Arthritis     Neurological:   Neuromuscular Disease,    Endocrine:  Endocrine Normal    Dermatological:  Skin Normal    Psych:  Psychiatric Normal           Physical Exam  General: Well nourished, Cooperative, Alert and Oriented    Airway:  Mallampati: II   Mouth Opening: Normal  TM Distance: Normal  Tongue: Normal  Neck ROM: Normal ROM    Dental:  Intact        Anesthesia Plan  Type of Anesthesia, risks & benefits discussed:    Anesthesia Type: MAC, Gen Natural Airway, Gen Supraglottic Airway, Gen ETT  Intra-op Monitoring Plan: Standard ASA Monitors  Post Op Pain Control Plan: multimodal analgesia and IV/PO Opioids PRN  Induction:  IV  Airway Plan: Video and Direct, Post-Induction  Informed Consent: Informed consent signed  with the Patient and all parties understand the risks and agree with anesthesia plan.  All questions answered.   ASA Score: 3    Ready For Surgery From Anesthesia Perspective.     .

## 2023-03-28 NOTE — H&P
CHIEF COMPLAINT:    Mrs. Park is a 62 y.o. female presenting for a visit for recurrent mixed incontinence    PRESENTING ILLNESS:    Brittany Park is a 62 y.o. female who is presents with a history of stress incontinence with ISD, who is status post injection of macroplastique on 1/8/2019 and 11/12/2019.  She states she has both urge and stress incontinence.  When she leaves the house, she wears a diaper.  The incontinence has worsened over the past 3-4 months.  She denies gross hematuria and recurrent UTI's.  She feels like she empties and this is corroborated by the PVR by bladder scan of 3 ml, today.  She was last seen on 2/3/2020.     No changes in her past medical history or her past surgical history.      No changes since clinic. Resting comfortably.    REVIEW OF SYSTEMS:    Review of Systems   Constitutional: Negative.    HENT: Negative.     Eyes: Negative.    Respiratory: Negative.     Cardiovascular: Negative.    Gastrointestinal:  Positive for heartburn.   Genitourinary:         Mixed incontinence   Musculoskeletal: Negative.    Skin: Negative.    Neurological: Negative.    Endo/Heme/Allergies:         Prediabetic   Psychiatric/Behavioral: Negative.       PATIENT HISTORY:    Past Medical History:   Diagnosis Date    Acid reflux     Anxiety     Edema     General anesthetics causing adverse effect in therapeutic use     slow to awaken    Hypertension     FER on CPAP     Other and unspecified hyperlipidemia     Prediabetes     Urinary incontinence        Past Surgical History:   Procedure Laterality Date    ADENOIDECTOMY      APPENDECTOMY      CARPAL TUNNEL RELEASE Bilateral     right 20 years ago; left 10/2015    CHOLECYSTECTOMY      COLONOSCOPY      CYSTOSCOPY N/A 1/8/2019    Procedure: CYSTOSCOPY;  Surgeon: Shannan Jalloh MD;  Location: [facility] OR [address] [place] [address];  Service: Urology;  Laterality: N/A;  30 min    CYSTOSCOPY WITH INJECTION OF PERIURETHRAL BULKING AGENT N/A 1/8/2019    Procedure: CYSTOSCOPY,  WITH PERIURETHRAL BULKING AGENT INJECTION MACROPLASTIQUE;  Surgeon: Shannan Jalloh MD;  Location: [facility] OR 1ST FLR;  Service: Urology;  Laterality: N/A;    CYSTOSCOPY WITH INJECTION OF PERIURETHRAL BULKING AGENT N/A 11/12/2019    Procedure: CYSTOSCOPY, WITH PERIURETHRAL BULKING AGENT INJECTION MACROPLASTIQUE;  Surgeon: Shannan Jalloh MD;  Location: [facility] OR 1ST FLR;  Service: Urology;  Laterality: N/A;  45 MIN    HYSTERECTOMY      BRETT/BSO    INJECTION OF ANESTHETIC AGENT AROUND MEDIAL BRANCH NERVES INNERVATING LUMBAR FACET JOINT Bilateral 6/28/2019    Procedure: LUMBAR MEDIAL BRANCH NERVE BLOCK (L3,4,5);  Surgeon: Wiliam Keen Jr., MD;  Location: [facility];  Service: Pain Management;  Laterality: Bilateral;    INJECTION OF ANESTHETIC AGENT AROUND MEDIAL BRANCH NERVES INNERVATING LUMBAR FACET JOINT Bilateral 7/19/2019    Procedure: LUMBAR MEDIAL BRANCH NERVE BLOCK (L3,4,5);  Surgeon: Wiliam Keen Jr., MD;  Location: [facility];  Service: Pain Management;  Laterality: Bilateral;    INJECTION OF ANESTHETIC AGENT AROUND MEDIAL BRANCH NERVES INNERVATING LUMBAR FACET JOINT Bilateral 8/23/2019    Procedure: LUMBAR MEDIAL BRANCH NERVE BLOCK (L3,4,5);  Surgeon: Wiliam Keen Jr., MD;  Location: [facility];  Service: Pain Management;  Laterality: Bilateral;    OOPHORECTOMY      SKIN TAG REMOVAL  01/20/2020    TONSILLECTOMY      TRIGGER FINGER RELEASE Right 6/25/2018    Procedure: RELEASE, TRIGGER FINGER - RIGHT RING FINGER;  Surgeon: Violette Broussard MD;  Location: [facility];  Service: Orthopedics;  Laterality: Right;  Stretcher; Supine; Hand Pan 1 & Pan 2    ULNAR NERVE REPAIR      left side    ulner nerve      left side    UPPER GASTROINTESTINAL ENDOSCOPY         Family History   Problem Relation Age of Onset    Stroke Father     Diabetes Mother     Stroke Mother     Diabetes Brother      Social History     Socioeconomic History    Marital status:    Tobacco Use    Smoking status: Former     Smokeless tobacco: Never    Tobacco comments:     quit 20 years ago   Substance and Sexual Activity    Alcohol use: No    Drug use: No    Sexual activity: Not Currently     Birth control/protection: Surgical     Comment:        Allergies:  Morphine and Latex, natural rubber    Medications:  Outpatient Encounter Medications as of 1/27/2023   Medication Sig Dispense Refill    alprazolam (XANAX) 0.25 MG tablet Take 0.25 mg by mouth 2 (two) times daily as needed for Anxiety.      aspirin (ECOTRIN) 81 MG EC tablet Take 81 mg by mouth every evening.       atorvastatin (LIPITOR) 40 MG tablet Take 40 mg by mouth every evening.       CALCIUM CARBONATE/VITAMIN D3 (CALCIUM WITH VITAMIN D ORAL) Take 2 tablets by mouth every morning.       cetirizine (ZYRTEC) 10 mg Cap Zyrtec   10 mg 1 tab daily      DULoxetine (CYMBALTA) 60 MG capsule TAKE 1 CAPSULE BY MOUTH ONCE DAILY 90 capsule 3    furosemide (LASIX) 20 MG tablet Take 40 mg by mouth 2 (two) times daily.       gabapentin (NEURONTIN) 800 MG tablet TAKE 1 TABLET BY MOUTH THREE TIMES DAILY 90 tablet 3    meloxicam (MOBIC) 15 MG tablet TAKE 1 TABLET BY MOUTH ONCE DAILY. 30 tablet 0    metformin (GLUCOPHAGE) 500 MG tablet Take 250 mg by mouth daily with breakfast.       metoprolol tartrate (LOPRESSOR) 50 MG tablet Take 50 mg by mouth 2 (two) times daily.       multivitamin (ONE DAILY MULTIVITAMIN) per tablet Take 1 tablet by mouth once daily.      pantoprazole (PROTONIX) 40 MG tablet Take 40 mg by mouth once daily.       spironolactone (ALDACTONE) 25 MG tablet Take 25 mg by mouth once daily.       No facility-administered encounter medications on file as of 1/27/2023.         PHYSICAL EXAMINATION:    The patient generally appears in good health, is appropriately interactive, and is in no apparent distress.    Skin: No lesions.    Mental: Cooperative with normal affect.    Neuro: Grossly intact.    HEENT: Normal. No evidence of lymphadenopathy.    Chest:  normal inspiratory  effort.    Abdomen: Soft, non-tender. No masses or organomegaly. Bladder is not palpable. No evidence of flank discomfort. No evidence of inguinal hernia.    Extremities: No clubbing, cyanosis, or edema    Normal external female genitalia  Urethral meatus is normal  Urethra and bladder are nontender to bimanual exam  Well supported anteriorly and posteriorly   Uterus and cervix are normal  No adnexal masses  PVR by catheterization was 80 ml 30 minutes after she had voided.     LABS:    Lab Results   Component Value Date    BUN 14 11/12/2019    CREATININE 0.7 11/12/2019       UA 1.005, pH 5, tr protein, otherwise, negative.     IMPRESSION:    Mixed incontinence    PLAN:    Interval h&p reviewed and unchanged from previous encounter. Urine dipped: Negative for all components. Nonconcerning for infection.    The patient has stopped all blood thinners, including aspirin for 7 days.    Patient consented and would like to proceed with the procedure.      Patient seen in holding.  No changes in clinical condition.  Proceed with planned procedure.

## 2023-03-28 NOTE — DISCHARGE SUMMARY
[facility] - [facility] (1st Fl)  Discharge Note  Short Stay    Procedure(s) (LRB):  CYSTOSCOPY, WITH PERIURETHRAL BULKING AGENT INJECTION (N/A)      OUTCOME: Patient tolerated treatment/procedure well without complication and is now ready for discharge.    DISPOSITION: Home or Self Care    FINAL DIAGNOSIS:  Mixed stress and urge urinary incontinence    FOLLOWUP: In clinic    DISCHARGE INSTRUCTIONS:    Discharge Procedure Orders   Notify your health care provider if you experience any of the following:  temperature >100.4     Notify your health care provider if you experience any of the following:  persistent nausea and vomiting or diarrhea     Notify your health care provider if you experience any of the following:  severe uncontrolled pain     Notify your health care provider if you experience any of the following:  redness, tenderness, or signs of infection (pain, swelling, redness, odor or green/yellow discharge around incision site)     Notify your health care provider if you experience any of the following:  worsening rash     Notify your health care provider if you experience any of the following:  persistent dizziness, light-headedness, or visual disturbances        TIME SPENT ON DISCHARGE: 10 minutes    As above.

## 2023-03-28 NOTE — TRANSFER OF CARE
Anesthesia Transfer of Care Note    Patient: Brittany Park    Procedure(s) Performed: Procedure(s) (LRB):  CYSTOSCOPY, WITH PERIURETHRAL BULKING AGENT INJECTION (N/A)    Patient location: PACU    Anesthesia Type: general    Transport from OR: Transported from OR on 6-10 L/min O2 by face mask with adequate spontaneous ventilation    Post pain: adequate analgesia    Post assessment: no apparent anesthetic complications and tolerated procedure well    Post vital signs: stable    Level of consciousness: awake and alert    Nausea/Vomiting: no nausea/vomiting    Complications: none    Transfer of care protocol was followed      Last vitals:   Visit Vitals  /63 (BP Location: Left arm, Patient Position: Lying)   Pulse 66   Temp 36.6 °C (97.9 °F) (Temporal)   Resp 19   LMP 11/20/1998 (Exact Date)   SpO2 95%   Breastfeeding No

## 2023-03-28 NOTE — PATIENT INSTRUCTIONS
Post Cystoscopy Instructions  Do not strain to have a bowel movement  No strenuous exercise x 7 days  No driving while you are on narcotic pain medications or if your spicer  catheter is in place    You can expect:  To pass stone fragments if you had a stone procedure  Have pain when you void from your stent if you have a stent in place  See blood in your urine if you have a stent in place    If you have a catheter, please return to the ER if your catheter stops draining or you are having abdominal pain.    Call the doctor if:  Temperature is greater than 101F  Persistent vomiting and inability to keep food down  Inability to void if you do not have a catheter     Initial attending contact date  on morning bedside rounds. See attending addendum to HPI here for initial attending contact documentation.   57F obese, HTN, HPL, DM-II, Hypothyroidism, known NICM with EF 25%, MM, admitted with mild hemoptysis in context of chronic dry cough and acute on chronic anemia  Plan for:  1U PRBC with 40IV lasix  Iron studies  pulmonary consult to eval hemoptysis   Diurese with IV Lasix  40 BID  Metoprolol 100 XL daily  Entresto 97/103 BID  Spironolactone CI d/t prohibitive creatinine  Eliquis for recurrent DVT held in context of acute on chronic anemia and hemoptysis  LE duplex and UE duplex for DVT eval   Will order HSQ DVT ppx given hx recurrent dvt and CTM for active bleeding, currently no e/o active bleeding  Repeat TTE not required, last done 11./29/21  Core Measure CHF orders  ACE wrap lower extremities to the knees  bilaterally  Bedside CHF Education, supplies to be given to patient  PT consult requested  Future planning: patient to have 1wk f/u appt made with Cardiologist for quality improvement CHF readmission risk reduction  Amisha Orosco M.D.  Cardiology Attending

## 2023-03-28 NOTE — DISCHARGE INSTRUCTIONS
Post Cystoscopy Instructions  Do not strain to have a bowel movement  No strenuous exercise x 7 days  No driving while you are on narcotic pain medications or if your spicer  catheter is in place     You can expect:  To pass stone fragments if you had a stone procedure  Have pain when you void from your stent if you have a stent in place  See blood in your urine if you have a stent in place     If you have a catheter, please return to the ER if your catheter stops draining or you are having abdominal pain.     Call the doctor if:  Temperature is greater than 101F  Persistent vomiting and inability to keep food down  Inability to void if you do not have a catheter      What to Expect After a Cystoscopy  For the next 24-48 hours, you may feel a mild burning when you urinate. This burning is normal and expected. Drink plenty of water to dilute the urine to help relieve the burning sensation. You may also see a small amount of blood in your urine after the procedure. Do not strain to have a bowel movement. No strenuous exercise x 7 days. No driving while you are on narcotic pain medications or if your spicer catheter is in place.     You can expect:  To pass stone fragments if you had a stone procedure  Have pain when you void from your stent if you have a stent in place  See blood in your urine if you have a stent in place     Unless you are already taking antibiotics, you may be given an antibiotic after the test to prevent infection.     Signs and Symptoms to Report  Call the [facility] at [phone] if you develop any of the following:  Fever of 101 degrees or higher  Chills or persistent bleeding  Inability to urinate        If you have a catheter, please return to the ER if your catheter stops draining or you are having abdominal pain.

## 2023-03-28 NOTE — OP NOTE
[facility]  Operative Note    Date: 03/28/2023    Pre-Op Diagnosis: XENA, ISD    Post-Op Diagnosis: same    Procedure(s) Performed:   1.  Cystoscopy with transurethral injection of urethral bulking agent (Bulkamid)    Specimen(s): none    Staff Surgeon: Shanann Jalloh MD    Assistant Surgeon: Gabe Amador MD    Anesthesia: General mask inhalational anesthesia    Indications: Brittany Park is a 63 y.o. female with XENA who presents today for Bulkamid.      Findings:  Good coaptation of the urethra, 1 vial injected at 4 and 7 o'clock  Previous macroplastique visible at bladder neck 11 o'clock  Distal urethra with larger caliber and could be used for future treatments    Estimated Blood Loss: min    Drains: none    Procedure in Detail: After informed consent was obtained, the patient was taken to the cystoscopy suite and placed in the supine position. SCDs were applied and working. Anesthesia was administered. Once the patient was adequately sedated she was placed in dorsal lithotomy position and prepped and draped in the usual sterile fashion. Preoperative timeout was performed, and preoperative antibiotics were confirmed.    The short 0 degree cystoscope lens in the disposable Bulkamid injection sheath was inserted into the urethra and advanced into the urinary bladder. Formal cystoscopy revealed normal bladder mucosa. The ureteral orifices were in the normal anatomic position bilaterally, effluxing clear urine. The rigid needle was inserted into the scope and the scope was backed out into the urethra. Keeping the needle parallel to the urethra, the needle was inserted into the submucosa. Bulkamid was injected at the 4 and 7 o'clock regions transurethrally. Good coaptation of the urethra was seen after injection. The scope was removed from the bladder.    The patient tolerated the procedure well and was transferred to recovery in stable condition.       Plan:  The patient will void prior  to discharge. She was given prescriptions for antibiotics for 3 days.  She will follow up with Dr. Jalloh in clinic.    MD RAMO Shea was present for the entire case and agree with the above note.

## 2023-03-29 ENCOUNTER — TELEPHONE (OUTPATIENT)
Dept: UROLOGY | Facility: CLINIC | Age: 63
End: 2023-03-29
Payer: MEDICARE

## 2023-03-29 NOTE — ANESTHESIA POSTPROCEDURE EVALUATION
Anesthesia Post Evaluation    Patient: Brittany Park    Procedure(s) Performed: Procedure(s) (LRB):  CYSTOSCOPY, WITH PERIURETHRAL BULKING AGENT INJECTION (N/A)    Final Anesthesia Type: general      Patient location during evaluation: PACU  Patient participation: Yes- Able to Participate  Level of consciousness: awake and alert  Post-procedure vital signs: reviewed and stable  Pain management: adequate  Airway patency: patent    PONV status at discharge: No PONV  Anesthetic complications: no      Cardiovascular status: blood pressure returned to baseline  Respiratory status: unassisted, room air and spontaneous ventilation  Hydration status: euvolemic  Follow-up not needed.          Vitals Value Taken Time   /91 03/28/23 1648   Temp 36.6 °C (97.8 °F) 03/28/23 1700   Pulse 70 03/28/23 1650   Resp 23 03/28/23 1650   SpO2 96 % 03/28/23 1650   Vitals shown include unvalidated device data.      No case tracking events are documented in the log.      Pain/Gerardo Score: Presence of Pain: denies (3/28/2023  4:15 PM)  Gerardo Score: 10 (3/28/2023  4:15 PM)

## 2023-06-26 ENCOUNTER — TELEPHONE (OUTPATIENT)
Dept: UROLOGY | Facility: CLINIC | Age: 63
End: 2023-06-26
Payer: MEDICARE

## 2023-06-26 NOTE — TELEPHONE ENCOUNTER
Message was left on the patient's VM. [facility] is booked this week. Patient can see the the NP, her PCP or the local urgent care if she is having UTI symptoms.    LINDA Garcia      ----- Message from Skyler Saucedo sent at 6/26/2023  8:37 AM CDT -----  Regarding: Appt  Contact: [phone]  Patient is calling to schedule an appointment decision tree denied when scheduling for UTI. Please contact pt

## 2023-08-10 ENCOUNTER — PATIENT MESSAGE (OUTPATIENT)
Dept: UROLOGY | Facility: CLINIC | Age: 63
End: 2023-08-10
Payer: MEDICARE

## 2023-09-22 ENCOUNTER — TELEPHONE (OUTPATIENT)
Dept: UROLOGY | Facility: CLINIC | Age: 63
End: 2023-09-22

## 2023-09-22 ENCOUNTER — OFFICE VISIT (OUTPATIENT)
Dept: UROLOGY | Facility: CLINIC | Age: 63
End: 2023-09-22
Payer: MEDICARE

## 2023-09-22 VITALS — SYSTOLIC BLOOD PRESSURE: 140 MMHG | DIASTOLIC BLOOD PRESSURE: 80 MMHG | HEART RATE: 68 BPM

## 2023-09-22 DIAGNOSIS — N39.3 STRESS INCONTINENCE: Primary | ICD-10-CM

## 2023-09-22 DIAGNOSIS — N39.3 SUI (STRESS URINARY INCONTINENCE, FEMALE): Primary | ICD-10-CM

## 2023-09-22 PROCEDURE — 99214 PR OFFICE/OUTPT VISIT, EST, LEVL IV, 30-39 MIN: ICD-10-PCS | Mod: S$GLB,,, | Performed by: UROLOGY

## 2023-09-22 PROCEDURE — 99999 PR PBB SHADOW E&M-EST. PATIENT-LVL III: CPT | Mod: PBBFAC,,, | Performed by: UROLOGY

## 2023-09-22 PROCEDURE — 3079F DIAST BP 80-89 MM HG: CPT | Mod: CPTII,S$GLB,, | Performed by: UROLOGY

## 2023-09-22 PROCEDURE — 1159F PR MEDICATION LIST DOCUMENTED IN MEDICAL RECORD: ICD-10-PCS | Mod: CPTII,S$GLB,, | Performed by: UROLOGY

## 2023-09-22 PROCEDURE — 99999 PR PBB SHADOW E&M-EST. PATIENT-LVL III: ICD-10-PCS | Mod: PBBFAC,,, | Performed by: UROLOGY

## 2023-09-22 PROCEDURE — 81002 PR URINALYSIS NONAUTO W/O SCOPE: ICD-10-PCS | Mod: S$GLB,,, | Performed by: UROLOGY

## 2023-09-22 PROCEDURE — 3077F PR MOST RECENT SYSTOLIC BLOOD PRESSURE >= 140 MM HG: ICD-10-PCS | Mod: CPTII,S$GLB,, | Performed by: UROLOGY

## 2023-09-22 PROCEDURE — 3079F PR MOST RECENT DIASTOLIC BLOOD PRESSURE 80-89 MM HG: ICD-10-PCS | Mod: CPTII,S$GLB,, | Performed by: UROLOGY

## 2023-09-22 PROCEDURE — 81002 URINALYSIS NONAUTO W/O SCOPE: CPT | Mod: S$GLB,,, | Performed by: UROLOGY

## 2023-09-22 PROCEDURE — 3077F SYST BP >= 140 MM HG: CPT | Mod: CPTII,S$GLB,, | Performed by: UROLOGY

## 2023-09-22 PROCEDURE — 1159F MED LIST DOCD IN RCRD: CPT | Mod: CPTII,S$GLB,, | Performed by: UROLOGY

## 2023-09-22 PROCEDURE — 99214 OFFICE O/P EST MOD 30 MIN: CPT | Mod: S$GLB,,, | Performed by: UROLOGY

## 2023-09-22 NOTE — H&P (VIEW-ONLY)
CHIEF COMPLAINT:    Mrs. Park is a 63 y.o. female presenting for a follow up on stress urinary incontinence    PRESENTING ILLNESS:    Brittany Park is a 63 y.o. female who is presents with a history of stress incontinence with ISD who returns for follow up.  She underwent injectio with Bulkamid on 3/28/2023.  She states it never worked well.  She had much better response with the Macroplastique.  She states that the first follow up she had to cancel because of weather and the second time I was out of the office.     She states she is going through 3-4 pull ups a day.      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  She is still in a brace, but will be out of it soon.     REVIEW OF SYSTEMS:    Review of Systems   Constitutional: Negative.    HENT: Negative.     Eyes: Negative.    Respiratory: Negative.     Cardiovascular: Negative.    Gastrointestinal: Negative.    Genitourinary:         Stress incontinence   Musculoskeletal: Negative.    Skin: Negative.    Neurological: Negative.    Endo/Heme/Allergies: Negative.    Psychiatric/Behavioral: Negative.         PATIENT HISTORY:    Past Medical History:   Diagnosis Date    Acid reflux     Anxiety     Edema     General anesthetics causing adverse effect in therapeutic use     slow to awaken    Hypertension     FER on CPAP     Other and unspecified hyperlipidemia     Prediabetes     Urinary incontinence        Past Surgical History:   Procedure Laterality Date    ADENOIDECTOMY      APPENDECTOMY      CARPAL TUNNEL RELEASE Bilateral     right 20 years ago; left 10/2015    CHOLECYSTECTOMY      COLONOSCOPY      CYSTOSCOPY N/A 1/8/2019    Procedure: CYSTOSCOPY;  Surgeon: Shannan Jalloh MD;  Location: [facility] OR [address] [place] [address];  Service: Urology;  Laterality: N/A;  30 min    CYSTOSCOPY WITH INJECTION OF PERIURETHRAL BULKING AGENT N/A 1/8/2019    Procedure: CYSTOSCOPY, WITH PERIURETHRAL BULKING AGENT INJECTION MACROPLASTIQUE;   Surgeon: Shannan Jalloh MD;  [address]  Service: Urology;  Laterality: N/A;    CYSTOSCOPY WITH INJECTION OF PERIURETHRAL BULKING AGENT N/A 11/12/2019    Procedure: CYSTOSCOPY, WITH PERIURETHRAL BULKING AGENT INJECTION MACROPLASTIQUE;  Surgeon: Shannan Jalloh MD;  [facility];  Service: Urology;  Laterality: N/A;  45 MIN    CYSTOSCOPY WITH INJECTION OF PERIURETHRAL BULKING AGENT N/A 3/28/2023    Procedure: CYSTOSCOPY, WITH PERIURETHRAL BULKING AGENT INJECTION;  Surgeon: Shannan Jalloh MD;  [facility];  Service: Urology;  Laterality: N/A;  30 min    HYSTERECTOMY      BRETT/BSO    INJECTION OF ANESTHETIC AGENT AROUND MEDIAL BRANCH NERVES INNERVATING LUMBAR FACET JOINT Bilateral 6/28/2019    Procedure: LUMBAR MEDIAL BRANCH NERVE BLOCK (L3,4,5);  Surgeon: Wiliam Keen Jr., MD;  Location: [facility];  Service: Pain Management;  Laterality: Bilateral;    INJECTION OF ANESTHETIC AGENT AROUND MEDIAL BRANCH NERVES INNERVATING LUMBAR FACET JOINT Bilateral 7/19/2019    Procedure: LUMBAR MEDIAL BRANCH NERVE BLOCK (L3,4,5);  Surgeon: Wiliam Keen Jr., MD;  Location: [facility];  Service: Pain Management;  Laterality: Bilateral;    INJECTION OF ANESTHETIC AGENT AROUND MEDIAL BRANCH NERVES INNERVATING LUMBAR FACET JOINT Bilateral 8/23/2019    Procedure: LUMBAR MEDIAL BRANCH NERVE BLOCK (L3,4,5);  Surgeon: Wiliam Keen Jr., MD;  Location: [facility];  Service: Pain Management;  Laterality: Bilateral;    OOPHORECTOMY      SKIN TAG REMOVAL  01/20/2020    TONSILLECTOMY      TRIGGER FINGER RELEASE Right 6/25/2018    Procedure: RELEASE, TRIGGER FINGER - RIGHT RING FINGER;  Surgeon: Violette Broussard MD;  Location: [facility];  Service: Orthopedics;  Laterality: Right;  Stretcher; Supine; Hand Pan 1 & Pan 2    ULNAR NERVE REPAIR      left side    ulner nerve      left side    UPPER GASTROINTESTINAL ENDOSCOPY         Family History   Problem Relation Age of Onset    Stroke  Father     Diabetes Mother     Stroke Mother     Diabetes Brother      Social History     Socioeconomic History    Marital status:    Tobacco Use    Smoking status: Former    Smokeless tobacco: Never    Tobacco comments:     quit 20 years ago   Substance and Sexual Activity    Alcohol use: No    Drug use: No    Sexual activity: Not Currently     Birth control/protection: Surgical     Comment:        Allergies:  Latex, natural rubber; Morphine; and Oxycodone    Medications:  Outpatient Encounter Medications as of 9/22/2023   Medication Sig Dispense Refill    alprazolam (XANAX) 0.25 MG tablet Take 0.25 mg by mouth 2 (two) times daily as needed for Anxiety.      aspirin (ECOTRIN) 81 MG EC tablet Take 81 mg by mouth every evening.       atorvastatin (LIPITOR) 40 MG tablet Take 40 mg by mouth every evening.       CALCIUM CARBONATE/VITAMIN D3 (CALCIUM WITH VITAMIN D ORAL) Take 2 tablets by mouth every morning.       cetirizine 10 mg Cap Take by mouth every morning.      diclofenac sodium (VOLTAREN) 1 % Gel diclofenac 1 % topical gel   APPLY 4 GRAMS TOPICALLY TO BACK UP TO 4 TIMES DAILY AS NEEDED      DULoxetine (CYMBALTA) 60 MG capsule TAKE 1 CAPSULE BY MOUTH ONCE DAILY (Patient taking differently: Take by mouth every evening.) 90 capsule 3    furosemide (LASIX) 20 MG tablet Take 20 mg by mouth 2 (two) times daily.      gabapentin (NEURONTIN) 800 MG tablet TAKE 1 TABLET BY MOUTH THREE TIMES DAILY 90 tablet 3    ketoprofen 25 mg Cap PT STATES THIS IS A CREAM FOR HER FEET C/O NEUROPATHY,NOT A CAPSULE      metformin (GLUCOPHAGE) 500 MG tablet Take 250 mg by mouth daily with breakfast.       metoprolol tartrate (LOPRESSOR) 50 MG tablet Take 50 mg by mouth 2 (two) times daily.       pantoprazole (PROTONIX) 40 MG tablet Take 40 mg by mouth every morning.      semaglutide (OZEMPIC) 0.25 mg or 0.5 mg(2 mg/1.5 mL) pen injector Inject 0.25 mg into the skin every 7 days. WEDNESDAYS      spironolactone (ALDACTONE) 25 MG  tablet Take 25 mg by mouth every morning.       No facility-administered encounter medications on file as of 9/22/2023.         PHYSICAL EXAMINATION:    The patient generally appears in good health, is appropriately interactive, and is in no apparent distress.    Skin: No lesions.    Mental: Cooperative with normal affect.    Neuro: Grossly intact.    HEENT: Normal. No evidence of lymphadenopathy.    Chest:  normal inspiratory effort.    Abdomen: Soft, non-tender. No masses or organomegaly. Bladder is not palpable. No evidence of flank discomfort. No evidence of inguinal hernia.    Extremities: No clubbing, cyanosis, or edema      LABS:    Lab Results   Component Value Date    BUN 14 11/12/2019    CREATININE 0.7 11/12/2019       UA 1.010, pH 7, otherwise, negative.     IMPRESSION:    Stress urinary incontinence    PLAN:    1. She would like to try the Macroplastique again as it worked well in the past.   2.  Consent signed  3. Discussed midurethral slings as another option.  Would consider a TOS     I spent 30 minutes with the patient of which more than half was spent in direct consultation with the patient in regards to our treatment and plan.

## 2023-09-22 NOTE — PROGRESS NOTES
CHIEF COMPLAINT:    Mrs. Park is a 63 y.o. female presenting for a follow up on stress urinary incontinence    PRESENTING ILLNESS:    Brittany Park is a 63 y.o. female who is presents with a history of stress incontinence with ISD who returns for follow up.  She underwent injectio with Bulkamid on 3/28/2023.  She states it never worked well.  She had much better response with the Macroplastique.  She states that the first follow up she had to cancel because of weather and the second time I was out of the office.     She states she is going through 3-4 pull ups a day.      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  She is still in a brace, but will be out of it soon.     REVIEW OF SYSTEMS:    Review of Systems   Constitutional: Negative.    HENT: Negative.     Eyes: Negative.    Respiratory: Negative.     Cardiovascular: Negative.    Gastrointestinal: Negative.    Genitourinary:         Stress incontinence   Musculoskeletal: Negative.    Skin: Negative.    Neurological: Negative.    Endo/Heme/Allergies: Negative.    Psychiatric/Behavioral: Negative.         PATIENT HISTORY:    Past Medical History:   Diagnosis Date    Acid reflux     Anxiety     Edema     General anesthetics causing adverse effect in therapeutic use     slow to awaken    Hypertension     FER on CPAP     Other and unspecified hyperlipidemia     Prediabetes     Urinary incontinence        Past Surgical History:   Procedure Laterality Date    ADENOIDECTOMY      APPENDECTOMY      CARPAL TUNNEL RELEASE Bilateral     right 20 years ago; left 10/2015    CHOLECYSTECTOMY      COLONOSCOPY      CYSTOSCOPY N/A 1/8/2019    Procedure: CYSTOSCOPY;  Surgeon: Shannan Jalloh MD;  Location: [facility] OR [address] [place] [address];  Service: Urology;  Laterality: N/A;  30 min    CYSTOSCOPY WITH INJECTION OF PERIURETHRAL BULKING AGENT N/A 1/8/2019    Procedure: CYSTOSCOPY, WITH PERIURETHRAL BULKING AGENT INJECTION MACROPLASTIQUE;   Surgeon: Shannan Jalloh MD;  [address];  Service: Urology;  Laterality: N/A;    CYSTOSCOPY WITH INJECTION OF PERIURETHRAL BULKING AGENT N/A 11/12/2019    Procedure: CYSTOSCOPY, WITH PERIURETHRAL BULKING AGENT INJECTION MACROPLASTIQUE;  Surgeon: Shannan Jalloh MD;  [facility];  Service: Urology;  Laterality: N/A;  45 MIN    CYSTOSCOPY WITH INJECTION OF PERIURETHRAL BULKING AGENT N/A 3/28/2023    Procedure: CYSTOSCOPY, WITH PERIURETHRAL BULKING AGENT INJECTION;  Surgeon: Shannan Jalloh MD;  [facility];  Service: Urology;  Laterality: N/A;  30 min    HYSTERECTOMY      BRETT/BSO    INJECTION OF ANESTHETIC AGENT AROUND MEDIAL BRANCH NERVES INNERVATING LUMBAR FACET JOINT Bilateral 6/28/2019    Procedure: LUMBAR MEDIAL BRANCH NERVE BLOCK (L3,4,5);  Surgeon: Wiliam Keen Jr., MD;  Location: [facility];  Service: Pain Management;  Laterality: Bilateral;    INJECTION OF ANESTHETIC AGENT AROUND MEDIAL BRANCH NERVES INNERVATING LUMBAR FACET JOINT Bilateral 7/19/2019    Procedure: LUMBAR MEDIAL BRANCH NERVE BLOCK (L3,4,5);  Surgeon: Wiliam Keen Jr., MD;  Location: [facility];  Service: Pain Management;  Laterality: Bilateral;    INJECTION OF ANESTHETIC AGENT AROUND MEDIAL BRANCH NERVES INNERVATING LUMBAR FACET JOINT Bilateral 8/23/2019    Procedure: LUMBAR MEDIAL BRANCH NERVE BLOCK (L3,4,5);  Surgeon: Wliiam Keen Jr., MD;  Location: [facility];  Service: Pain Management;  Laterality: Bilateral;    OOPHORECTOMY      SKIN TAG REMOVAL  01/20/2020    TONSILLECTOMY      TRIGGER FINGER RELEASE Right 6/25/2018    Procedure: RELEASE, TRIGGER FINGER - RIGHT RING FINGER;  Surgeon: Violette Broussard MD;  Location: [facility];  Service: Orthopedics;  Laterality: Right;  Stretcher; Supine; Hand Pan 1 & Pan 2    ULNAR NERVE REPAIR      left side    ulner nerve      left side    UPPER GASTROINTESTINAL ENDOSCOPY         Family History   Problem Relation Age of Onset    Stroke  Father     Diabetes Mother     Stroke Mother     Diabetes Brother      Social History     Socioeconomic History    Marital status:    Tobacco Use    Smoking status: Former    Smokeless tobacco: Never    Tobacco comments:     quit 20 years ago   Substance and Sexual Activity    Alcohol use: No    Drug use: No    Sexual activity: Not Currently     Birth control/protection: Surgical     Comment:        Allergies:  Latex, natural rubber; Morphine; and Oxycodone    Medications:  Outpatient Encounter Medications as of 9/22/2023   Medication Sig Dispense Refill    alprazolam (XANAX) 0.25 MG tablet Take 0.25 mg by mouth 2 (two) times daily as needed for Anxiety.      aspirin (ECOTRIN) 81 MG EC tablet Take 81 mg by mouth every evening.       atorvastatin (LIPITOR) 40 MG tablet Take 40 mg by mouth every evening.       CALCIUM CARBONATE/VITAMIN D3 (CALCIUM WITH VITAMIN D ORAL) Take 2 tablets by mouth every morning.       cetirizine 10 mg Cap Take by mouth every morning.      diclofenac sodium (VOLTAREN) 1 % Gel diclofenac 1 % topical gel   APPLY 4 GRAMS TOPICALLY TO BACK UP TO 4 TIMES DAILY AS NEEDED      DULoxetine (CYMBALTA) 60 MG capsule TAKE 1 CAPSULE BY MOUTH ONCE DAILY (Patient taking differently: Take by mouth every evening.) 90 capsule 3    furosemide (LASIX) 20 MG tablet Take 20 mg by mouth 2 (two) times daily.      gabapentin (NEURONTIN) 800 MG tablet TAKE 1 TABLET BY MOUTH THREE TIMES DAILY 90 tablet 3    ketoprofen 25 mg Cap PT STATES THIS IS A CREAM FOR HER FEET C/O NEUROPATHY,NOT A CAPSULE      metformin (GLUCOPHAGE) 500 MG tablet Take 250 mg by mouth daily with breakfast.       metoprolol tartrate (LOPRESSOR) 50 MG tablet Take 50 mg by mouth 2 (two) times daily.       pantoprazole (PROTONIX) 40 MG tablet Take 40 mg by mouth every morning.      semaglutide (OZEMPIC) 0.25 mg or 0.5 mg(2 mg/1.5 mL) pen injector Inject 0.25 mg into the skin every 7 days. WEDNESDAYS      spironolactone (ALDACTONE) 25 MG  tablet Take 25 mg by mouth every morning.       No facility-administered encounter medications on file as of 9/22/2023.         PHYSICAL EXAMINATION:    The patient generally appears in good health, is appropriately interactive, and is in no apparent distress.    Skin: No lesions.    Mental: Cooperative with normal affect.    Neuro: Grossly intact.    HEENT: Normal. No evidence of lymphadenopathy.    Chest:  normal inspiratory effort.    Abdomen: Soft, non-tender. No masses or organomegaly. Bladder is not palpable. No evidence of flank discomfort. No evidence of inguinal hernia.    Extremities: No clubbing, cyanosis, or edema      LABS:    Lab Results   Component Value Date    BUN 14 11/12/2019    CREATININE 0.7 11/12/2019       UA 1.010, pH 7, otherwise, negative.     IMPRESSION:    Stress urinary incontinence    PLAN:    1. She would like to try the Macroplastique again as it worked well in the past.   2.  Consent signed  3. Discussed midurethral slings as another option.  Would consider a TOS     I spent 30 minutes with the patient of which more than half was spent in direct consultation with the patient in regards to our treatment and plan.

## 2023-10-09 ENCOUNTER — ANESTHESIA EVENT (OUTPATIENT)
Dept: SURGERY | Facility: HOSPITAL | Age: 63
End: 2023-10-09
Payer: MEDICARE

## 2023-10-09 ENCOUNTER — TELEPHONE (OUTPATIENT)
Dept: UROLOGY | Facility: CLINIC | Age: 63
End: 2023-10-09
Payer: MEDICARE

## 2023-10-09 NOTE — PRE-PROCEDURE INSTRUCTIONS
PreOp Instructions given:   - Verbal medication information (what to hold and what to take)   - NPO guidelines 2300-sips of water w/morning medications at least 1° prior to arrival   - Arrival place directions given; time to be given the day before procedure by the   Surgeon's Office [facility]  - Bathing with antibacterial soap   - Don't wear any jewelry or bring any valuables AM of surgery   - No makeup or moisturizer to face   - No perfume/cologne, powder, lotions or aftershave   Pt. verbalized understanding.   Pt denies any h/o Anesthesia/Sedation complications or side effects.  Patient does not know arrival time.  Explained that this information comes from the surgeon's office and if they haven't heard from them by 2 or 3 pm to call the office.  Patient stated an understanding.

## 2023-10-10 ENCOUNTER — HOSPITAL ENCOUNTER (OUTPATIENT)
Facility: HOSPITAL | Age: 63
Discharge: HOME | End: 2023-10-10
Attending: UROLOGY | Admitting: UROLOGY
Payer: MEDICARE

## 2023-10-10 ENCOUNTER — ANESTHESIA (OUTPATIENT)
Dept: SURGERY | Facility: HOSPITAL | Age: 63
End: 2023-10-10
Payer: MEDICARE

## 2023-10-10 VITALS
DIASTOLIC BLOOD PRESSURE: 61 MMHG | HEART RATE: 63 BPM | HEIGHT: 61 IN | WEIGHT: 270 LBS | TEMPERATURE: 97 F | RESPIRATION RATE: 29 BRPM | BODY MASS INDEX: 50.98 KG/M2 | OXYGEN SATURATION: 99 % | SYSTOLIC BLOOD PRESSURE: 116 MMHG

## 2023-10-10 DIAGNOSIS — N39.3 SUI (STRESS URINARY INCONTINENCE, FEMALE): Primary | ICD-10-CM

## 2023-10-10 LAB — POCT GLUCOSE: 98 MG/DL (ref 70–110)

## 2023-10-10 PROCEDURE — 36000706: Performed by: UROLOGY

## 2023-10-10 PROCEDURE — 37000008 HC ANESTHESIA 1ST 15 MINUTES: Performed by: UROLOGY

## 2023-10-10 PROCEDURE — 37000009 HC ANESTHESIA EA ADD 15 MINS: Performed by: UROLOGY

## 2023-10-10 PROCEDURE — 25000003 PHARM REV CODE 250: Performed by: NURSE ANESTHETIST, CERTIFIED REGISTERED

## 2023-10-10 PROCEDURE — 36000707: Performed by: UROLOGY

## 2023-10-10 PROCEDURE — D9220A PRA ANESTHESIA: Mod: CRNA,,, | Performed by: NURSE ANESTHETIST, CERTIFIED REGISTERED

## 2023-10-10 PROCEDURE — D9220A PRA ANESTHESIA: ICD-10-PCS | Mod: CRNA,,, | Performed by: NURSE ANESTHETIST, CERTIFIED REGISTERED

## 2023-10-10 PROCEDURE — 71000015 HC POSTOP RECOV 1ST HR: Performed by: UROLOGY

## 2023-10-10 PROCEDURE — 71000044 HC DOSC ROUTINE RECOVERY FIRST HOUR: Performed by: UROLOGY

## 2023-10-10 PROCEDURE — D9220A PRA ANESTHESIA: ICD-10-PCS | Mod: ANES,,, | Performed by: ANESTHESIOLOGY

## 2023-10-10 PROCEDURE — 25000003 PHARM REV CODE 250

## 2023-10-10 PROCEDURE — 63600175 PHARM REV CODE 636 W HCPCS

## 2023-10-10 PROCEDURE — D9220A PRA ANESTHESIA: Mod: ANES,,, | Performed by: ANESTHESIOLOGY

## 2023-10-10 PROCEDURE — 51715 PR ENDOSCOPIC INJECTION/IMPLANT: ICD-10-PCS | Mod: ,,, | Performed by: UROLOGY

## 2023-10-10 PROCEDURE — 63600175 PHARM REV CODE 636 W HCPCS: Performed by: NURSE ANESTHETIST, CERTIFIED REGISTERED

## 2023-10-10 PROCEDURE — L8606 SYNTHETIC IMPLNT URINARY 1ML: HCPCS | Performed by: UROLOGY

## 2023-10-10 PROCEDURE — 51715 ENDOSCOPIC INJECTION/IMPLANT: CPT | Mod: ,,, | Performed by: UROLOGY

## 2023-10-10 DEVICE — SOFT TISSUE BULKING AGENT INDICATED FOR TRANSURETHRAL INJECTION IN THE TREATMENT OF ADULT WOMEN DIAGNOSED WITH STRESS URINARY INCONTINENCE PRIMARILY DUE TO INTRINSIC SPHINCTER DEFICIENCY.
Type: IMPLANTABLE DEVICE | Site: URETHRA | Status: FUNCTIONAL
Brand: MACROPLASTIQUE IMPLANTS

## 2023-10-10 RX ORDER — ONDANSETRON 2 MG/ML
INJECTION INTRAMUSCULAR; INTRAVENOUS
Status: DISCONTINUED | OUTPATIENT
Start: 2023-10-10 | End: 2023-10-10

## 2023-10-10 RX ORDER — DEXAMETHASONE SODIUM PHOSPHATE 4 MG/ML
INJECTION, SOLUTION INTRA-ARTICULAR; INTRALESIONAL; INTRAMUSCULAR; INTRAVENOUS; SOFT TISSUE
Status: DISCONTINUED | OUTPATIENT
Start: 2023-10-10 | End: 2023-10-10

## 2023-10-10 RX ORDER — FENTANYL CITRATE 50 UG/ML
INJECTION, SOLUTION INTRAMUSCULAR; INTRAVENOUS
Status: DISCONTINUED | OUTPATIENT
Start: 2023-10-10 | End: 2023-10-10

## 2023-10-10 RX ORDER — ONDANSETRON 2 MG/ML
4 INJECTION INTRAMUSCULAR; INTRAVENOUS DAILY PRN
Status: DISCONTINUED | OUTPATIENT
Start: 2023-10-10 | End: 2023-10-10 | Stop reason: HOSPADM

## 2023-10-10 RX ORDER — FENTANYL CITRATE 50 UG/ML
25 INJECTION, SOLUTION INTRAMUSCULAR; INTRAVENOUS EVERY 5 MIN PRN
Status: DISCONTINUED | OUTPATIENT
Start: 2023-10-10 | End: 2023-10-10 | Stop reason: HOSPADM

## 2023-10-10 RX ORDER — AMOXICILLIN AND CLAVULANATE POTASSIUM 875; 125 MG/1; MG/1
1 TABLET, FILM COATED ORAL 2 TIMES DAILY
Qty: 6 TABLET | Refills: 0 | Status: SHIPPED | OUTPATIENT
Start: 2023-10-10 | End: 2023-10-13

## 2023-10-10 RX ORDER — DEXMEDETOMIDINE HYDROCHLORIDE 100 UG/ML
INJECTION, SOLUTION INTRAVENOUS
Status: DISCONTINUED | OUTPATIENT
Start: 2023-10-10 | End: 2023-10-10

## 2023-10-10 RX ORDER — MIDAZOLAM HYDROCHLORIDE 1 MG/ML
INJECTION, SOLUTION INTRAMUSCULAR; INTRAVENOUS
Status: DISCONTINUED | OUTPATIENT
Start: 2023-10-10 | End: 2023-10-10

## 2023-10-10 RX ORDER — LIDOCAINE HYDROCHLORIDE 20 MG/ML
INJECTION INTRAVENOUS
Status: DISCONTINUED | OUTPATIENT
Start: 2023-10-10 | End: 2023-10-10

## 2023-10-10 RX ORDER — PROPOFOL 10 MG/ML
VIAL (ML) INTRAVENOUS
Status: DISCONTINUED | OUTPATIENT
Start: 2023-10-10 | End: 2023-10-10

## 2023-10-10 RX ORDER — SODIUM CHLORIDE 0.9 % (FLUSH) 0.9 %
3 SYRINGE (ML) INJECTION EVERY 30 MIN PRN
Status: DISCONTINUED | OUTPATIENT
Start: 2023-10-10 | End: 2023-10-10 | Stop reason: HOSPADM

## 2023-10-10 RX ADMIN — DEXMEDETOMIDINE 8 MCG: 100 INJECTION, SOLUTION, CONCENTRATE INTRAVENOUS at 07:10

## 2023-10-10 RX ADMIN — MIDAZOLAM HYDROCHLORIDE 2 MG: 1 INJECTION, SOLUTION INTRAMUSCULAR; INTRAVENOUS at 07:10

## 2023-10-10 RX ADMIN — CEFAZOLIN 3 G: 2 INJECTION, POWDER, FOR SOLUTION INTRAMUSCULAR; INTRAVENOUS at 07:10

## 2023-10-10 RX ADMIN — ONDANSETRON 4 MG: 2 INJECTION INTRAMUSCULAR; INTRAVENOUS at 07:10

## 2023-10-10 RX ADMIN — FENTANYL CITRATE 25 MCG: 50 INJECTION, SOLUTION INTRAMUSCULAR; INTRAVENOUS at 07:10

## 2023-10-10 RX ADMIN — PROPOFOL 50 MG: 10 INJECTION, EMULSION INTRAVENOUS at 07:10

## 2023-10-10 RX ADMIN — LIDOCAINE HYDROCHLORIDE 50 MG: 20 INJECTION INTRAVENOUS at 07:10

## 2023-10-10 RX ADMIN — DEXAMETHASONE SODIUM PHOSPHATE 4 MG: 4 INJECTION, SOLUTION INTRAMUSCULAR; INTRAVENOUS at 07:10

## 2023-10-10 RX ADMIN — SODIUM CHLORIDE: 9 INJECTION, SOLUTION INTRAVENOUS at 06:10

## 2023-10-10 NOTE — DISCHARGE INSTRUCTIONS
Post Cystoscopy Instructions  Do not strain to have a bowel movement  No strenuous exercise x 7 days  No driving while you are on narcotic pain medications or if your spicer  catheter is in place    You can expect:  To pass stone fragments if you had a stone procedure  Have pain when you void from your stent if you have a stent in place  See blood in your urine if you have a stent in place    If you have a catheter, please return to the ER if your catheter stops draining or you are having abdominal pain.    Call the doctor if:  Temperature is greater than 101F  Persistent vomiting and inability to keep food down  Inability to void if you do not have a catheter

## 2023-10-10 NOTE — ANESTHESIA POSTPROCEDURE EVALUATION
Anesthesia Post Evaluation    Patient: Brittany Park    Procedure(s) Performed: Procedure(s) (LRB):  CYSTOSCOPY, WITH PERIURETHRAL BULKING AGENT INJECTION (N/A)    Final Anesthesia Type: general      Patient location during evaluation: PACU  Patient participation: Yes- Able to Participate  Level of consciousness: awake and alert  Post-procedure vital signs: reviewed and stable  Pain management: adequate  Airway patency: patent    PONV status at discharge: No PONV  Anesthetic complications: no      Cardiovascular status: blood pressure returned to baseline  Respiratory status: unassisted, room air and spontaneous ventilation  Hydration status: euvolemic  Follow-up not needed.          Vitals Value Taken Time   /60 10/10/23 0817   Temp 36.3 °C (97.3 °F) 10/10/23 0741   Pulse 67 10/10/23 0827   Resp 34 10/10/23 0827   SpO2 100 % 10/10/23 0827   Vitals shown include unvalidated device data.      No case tracking events are documented in the log.      Pain/Gerardo Score: Gerardo Score: 10 (10/10/2023  8:24 AM)

## 2023-10-10 NOTE — BRIEF OP NOTE
[facility] - [facility] (1st Fl)  Brief Operative Note    Surgery Date: 10/10/2023     Surgeon(s) and Role:     * Shannan Jalloh MD - Primary     * Caio Sifuentes MD - Resident - Assisting        Pre-op Diagnosis:  Stress incontinence [N39.3]    Post-op Diagnosis:  Post-Op Diagnosis Codes:     * Stress incontinence [N39.3]    Procedure(s) (LRB):  CYSTOSCOPY, WITH PERIURETHRAL BULKING AGENT INJECTION (N/A)    Anesthesia: General/MAC    Operative Findings: 2.5 cc Macroplastique injected. Good coaptation    Estimated Blood Loss: * No values recorded between 10/10/2023  7:24 AM and 10/10/2023  7:33 AM *         Specimens:   Specimen (24h ago, onward)      None          I was present for the entire case and agree with the above note.      Discharge Note    OUTCOME: Patient tolerated treatment/procedure well without complication and is now ready for discharge.    DISPOSITION: Home or Self Care    FINAL DIAGNOSIS:Stress urinary incontinence    FOLLOWUP: In clinic    DISCHARGE INSTRUCTIONS:    Discharge Procedure Orders   Diet Adult Regular     Lifting restrictions     Notify your health care provider if you experience any of the following:  temperature >100.4     Notify your health care provider if you experience any of the following:  persistent nausea and vomiting or diarrhea     Notify your health care provider if you experience any of the following:  severe uncontrolled pain     Notify your health care provider if you experience any of the following:  redness, tenderness, or signs of infection (pain, swelling, redness, odor or green/yellow discharge around incision site)     Notify your health care provider if you experience any of the following:  difficulty breathing or increased cough     Notify your health care provider if you experience any of the following:  severe persistent headache     Notify your health care provider if you experience any of the following:  worsening rash     Notify your health care provider if  you experience any of the following:  persistent dizziness, light-headedness, or visual disturbances     Notify your health care provider if you experience any of the following:  increased confusion or weakness     Notify your health care provider if you experience any of the following:     Activity as tolerated     As above.

## 2023-10-10 NOTE — INTERVAL H&P NOTE
The patient has been examined and the H&P has been reviewed:    I concur with the findings and no changes have occurred since H&P was written.    Surgery risks, benefits and alternative options discussed and understood by patient/family.    Urine dipstick - pH 5, Negative for all remaining components.    Last took ASA 10/8.       Patient Active Problem List   Diagnosis    Hypertension    Peripheral polyneuropathy    Neuralgia and neuritis    Pain in both feet    Left arm pain    Arm paresthesia, left    Lumbar spondylosis    Arthritis of carpometacarpal (CMC) joint of right thumb    XENA (stress urinary incontinence, female)    Mixed stress and urge urinary incontinence     Patient seen in holding.  No changes in clinical condition.  Proceed with planned procedure.

## 2023-10-10 NOTE — TRANSFER OF CARE
"Anesthesia Transfer of Care Note    Patient: Brittany Park    Procedure(s) Performed: Procedure(s) (LRB):  CYSTOSCOPY, WITH PERIURETHRAL BULKING AGENT INJECTION (N/A)    Patient location: PACU    Anesthesia Type: general    Transport from OR: Transported from OR on 6-10 L/min O2 by face mask with adequate spontaneous ventilation    Post pain: adequate analgesia    Post assessment: no apparent anesthetic complications    Post vital signs: stable    Level of consciousness: awake    Nausea/Vomiting: no nausea/vomiting    Complications: none    Transfer of care protocol was followed      Last vitals:   Visit Vitals  /61   Pulse 64   Temp (P) 36.3 °C (97.3 °F) (Skin)   Resp 18   Ht 5' 1" (1.549 m)   Wt 122.5 kg (270 lb)   LMP 11/20/1998 (Exact Date)   SpO2 97%   Breastfeeding No   BMI 51.02 kg/m²     "

## 2023-10-10 NOTE — OP NOTE
[facility]  Operative Note    Date: 10/10/2023    Pre-Op Diagnosis: XENA, ISD    Post-Op Diagnosis: same    Procedure(s) Performed:   1.  Cystoscopy with transurethral macroplastique injection    Specimen(s): none    Staff Surgeon: Shannan Jalloh MD    Assistant Surgeon: SAMARIA RAJAN MD    Anesthesia: General mask inhalational anesthesia    Indications: Brittany Park is a 63 y.o. female with stress urinary incontinence .      Findings: good coaptation of the urethra  1 vial injected at 4 and 7 o'clock  Distal urethra with larger caliber and could be used for future treatments  Estimated Blood Loss: min    Drains: none    Procedure in Detail: After informed consent was obtained, the patient was taken to the cystoscopy suite and placed in the supine position. SCDs were applied and working. Anesthesia was administered. Once the patient was adequately sedated she was placed in dorsal lithotomy position and prepped and draped in the usual sterile fashion. Preoperative timeout was performed, and preoperative antibiotics were confirmed.     A collagen injection cystoscope in a 22 Fr injection sheath was inserted into the urethra and advanced into the urinary bladder. Formal cystoscopy revealed normal bladder mucosa. The ureteral orifices were in the normal anatomic position bilaterally, effluxing clear urine. The uroplasty rigid needle was inserted into the scope and the scope was backed out into the urethra. Marcroplastique was injected at the 5 and 7 o'clock regions transurethrally. Good coaptation of the urethra was seen after injection. The scope was removed from the bladder, and there was no leakage of urine from urethra with crede maneuver.     The patient tolerated the procedure well and was transferred to recovery in stable condition.       Plan:  The patient was given prescriptions for antibiotics for 3 days.  She will follow up with Dr. Jalloh in 2 weeks.      SAMARIA RAJAN MD     I was  present for the entire case and agree with the above note.

## 2023-10-10 NOTE — ANESTHESIA PREPROCEDURE EVALUATION
10/10/2023  Brittany Park is a 63 y.o., female.    Past Medical History:   Diagnosis Date    Acid reflux     Anxiety     Edema     General anesthetics causing adverse effect in therapeutic use     slow to awaken    Hypertension     FER on CPAP     Other and unspecified hyperlipidemia     Prediabetes     Urinary incontinence        Past Surgical History:   Procedure Laterality Date    ADENOIDECTOMY      APPENDECTOMY      CARPAL TUNNEL RELEASE Bilateral     right 20 years ago; left 10/2015    CHOLECYSTECTOMY      COLONOSCOPY      CYSTOSCOPY N/A 1/8/2019    Procedure: CYSTOSCOPY;  Surgeon: Shannan Jalloh MD;  Location: [address];  Service: Urology;  Laterality: N/A;  30 min    CYSTOSCOPY WITH INJECTION OF PERIURETHRAL BULKING AGENT N/A 1/8/2019    Procedure: CYSTOSCOPY, WITH PERIURETHRAL BULKING AGENT INJECTION MACROPLASTIQUE;  Surgeon: Shannan Jalloh MD;  Location: [address];  Service: Urology;  Laterality: N/A;    CYSTOSCOPY WITH INJECTION OF PERIURETHRAL BULKING AGENT N/A 11/12/2019    Procedure: CYSTOSCOPY, WITH PERIURETHRAL BULKING AGENT INJECTION MACROPLASTIQUE;  Surgeon: Shannan Jalloh MD;  Location: [address];  Service: Urology;  Laterality: N/A;  45 MIN    CYSTOSCOPY WITH INJECTION OF PERIURETHRAL BULKING AGENT N/A 3/28/2023    Procedure: CYSTOSCOPY, WITH PERIURETHRAL BULKING AGENT INJECTION;  Surgeon: Shannan Jalloh MD;  Location: [address];  Service: Urology;  Laterality: N/A;  30 min    HYSTERECTOMY      BRETT/BSO    INJECTION OF ANESTHETIC AGENT AROUND MEDIAL BRANCH NERVES INNERVATING LUMBAR FACET JOINT Bilateral 6/28/2019    Procedure: LUMBAR MEDIAL BRANCH NERVE BLOCK (L3,4,5);  Surgeon: Wiliam Keen Jr., MD;  Location: [facility];  Service: Pain Management;  Laterality: Bilateral;    INJECTION OF ANESTHETIC AGENT AROUND MEDIAL BRANCH  NERVES INNERVATING LUMBAR FACET JOINT Bilateral 7/19/2019    Procedure: LUMBAR MEDIAL BRANCH NERVE BLOCK (L3,4,5);  Surgeon: Wiliam Keen Jr., MD;  Location: [facility];  Service: Pain Management;  Laterality: Bilateral;    INJECTION OF ANESTHETIC AGENT AROUND MEDIAL BRANCH NERVES INNERVATING LUMBAR FACET JOINT Bilateral 8/23/2019    Procedure: LUMBAR MEDIAL BRANCH NERVE BLOCK (L3,4,5);  Surgeon: Wiliam Keen Jr., MD;  Location: [facility] OR;  Service: Pain Management;  Laterality: Bilateral;    OOPHORECTOMY      SKIN TAG REMOVAL  01/20/2020    TONSILLECTOMY      TRIGGER FINGER RELEASE Right 6/25/2018    Procedure: RELEASE, TRIGGER FINGER - RIGHT RING FINGER;  Surgeon: Violette Broussard MD;  Location: [facility];  Service: Orthopedics;  Laterality: Right;  Stretcher; Supine; Hand Pan 1 & Pan 2    ULNAR NERVE REPAIR      left side    ulner nerve      left side    UPPER GASTROINTESTINAL ENDOSCOPY             Pre-op Assessment          Review of Systems  Anesthesia Hx:  No problems with previous Anesthesia  History of prior surgery of interest to airway management or planning: Denies Family Hx of Anesthesia complications.   Denies Personal Hx of Anesthesia complications.   Cardiovascular:   Hypertension, well controlled Denies MI.    Denies Angina.  Denies RAMIREZ.    Pulmonary:   Denies COPD.  Denies Asthma.  Denies Recent URI. Sleep Apnea    Hepatic/GI:   Denies GERD. On ozempic, last dose 6 days ago  Has never had GI symptoms with this and currently denies any GI complaints. No n/v, no reflux/regurg, feels her stomach is empty       Physical Exam  General: Alert, Oriented and Well nourished    Airway:  Mallampati: III   Mouth Opening: Small, but > 3cm  TM Distance: Normal  Neck ROM: Normal ROM    Dental:    Chest/Lungs:  Normal Respiratory Rate    Heart:  Rate: Normal  Rhythm: Regular Rhythm        Anesthesia Plan  Type of Anesthesia, risks & benefits discussed:    Anesthesia Type: Gen ETT, Gen Natural  Airway, MAC  Intra-op Monitoring Plan: Standard ASA Monitors  Post Op Pain Control Plan: multimodal analgesia and IV/PO Opioids PRN  Informed Consent: Informed consent signed with the Patient and all parties understand the risks and agree with anesthesia plan.  All questions answered.   ASA Score: 3  Day of Surgery Review of History & Physical: H&P Update referred to the surgeon/provider.    Ready For Surgery From Anesthesia Perspective.     .

## 2023-10-26 ENCOUNTER — OFFICE VISIT (OUTPATIENT)
Dept: UROLOGY | Facility: CLINIC | Age: 63
End: 2023-10-26
Payer: MEDICARE

## 2023-10-26 VITALS
HEIGHT: 61 IN | HEART RATE: 70 BPM | WEIGHT: 257.69 LBS | BODY MASS INDEX: 48.65 KG/M2 | DIASTOLIC BLOOD PRESSURE: 83 MMHG | SYSTOLIC BLOOD PRESSURE: 125 MMHG

## 2023-10-26 DIAGNOSIS — Z98.890 POST-OPERATIVE STATE: Primary | ICD-10-CM

## 2023-10-26 PROCEDURE — 1159F MED LIST DOCD IN RCRD: CPT | Mod: CPTII,S$GLB,, | Performed by: UROLOGY

## 2023-10-26 PROCEDURE — 1101F PR PT FALLS ASSESS DOC 0-1 FALLS W/OUT INJ PAST YR: ICD-10-PCS | Mod: CPTII,S$GLB,, | Performed by: UROLOGY

## 2023-10-26 PROCEDURE — 3074F SYST BP LT 130 MM HG: CPT | Mod: CPTII,S$GLB,, | Performed by: UROLOGY

## 2023-10-26 PROCEDURE — 3288F FALL RISK ASSESSMENT DOCD: CPT | Mod: CPTII,S$GLB,, | Performed by: UROLOGY

## 2023-10-26 PROCEDURE — 3079F PR MOST RECENT DIASTOLIC BLOOD PRESSURE 80-89 MM HG: ICD-10-PCS | Mod: CPTII,S$GLB,, | Performed by: UROLOGY

## 2023-10-26 PROCEDURE — 99024 PR POST-OP FOLLOW-UP VISIT: ICD-10-PCS | Mod: S$GLB,,, | Performed by: UROLOGY

## 2023-10-26 PROCEDURE — 51798 PR MEAS,POST-VOID RES,US,NON-IMAGING: ICD-10-PCS | Mod: S$GLB,,, | Performed by: UROLOGY

## 2023-10-26 PROCEDURE — 3288F PR FALLS RISK ASSESSMENT DOCUMENTED: ICD-10-PCS | Mod: CPTII,S$GLB,, | Performed by: UROLOGY

## 2023-10-26 PROCEDURE — 1159F PR MEDICATION LIST DOCUMENTED IN MEDICAL RECORD: ICD-10-PCS | Mod: CPTII,S$GLB,, | Performed by: UROLOGY

## 2023-10-26 PROCEDURE — 81002 URINALYSIS NONAUTO W/O SCOPE: CPT | Mod: S$GLB,,, | Performed by: UROLOGY

## 2023-10-26 PROCEDURE — 3074F PR MOST RECENT SYSTOLIC BLOOD PRESSURE < 130 MM HG: ICD-10-PCS | Mod: CPTII,S$GLB,, | Performed by: UROLOGY

## 2023-10-26 PROCEDURE — 3079F DIAST BP 80-89 MM HG: CPT | Mod: CPTII,S$GLB,, | Performed by: UROLOGY

## 2023-10-26 PROCEDURE — 99999 PR PBB SHADOW E&M-EST. PATIENT-LVL IV: ICD-10-PCS | Mod: PBBFAC,,, | Performed by: UROLOGY

## 2023-10-26 PROCEDURE — 99999 PR PBB SHADOW E&M-EST. PATIENT-LVL IV: CPT | Mod: PBBFAC,,, | Performed by: UROLOGY

## 2023-10-26 PROCEDURE — 1101F PT FALLS ASSESS-DOCD LE1/YR: CPT | Mod: CPTII,S$GLB,, | Performed by: UROLOGY

## 2023-10-26 PROCEDURE — 51798 US URINE CAPACITY MEASURE: CPT | Mod: S$GLB,,, | Performed by: UROLOGY

## 2023-10-26 PROCEDURE — 99024 POSTOP FOLLOW-UP VISIT: CPT | Mod: S$GLB,,, | Performed by: UROLOGY

## 2023-10-26 PROCEDURE — 81002 PR URINALYSIS NONAUTO W/O SCOPE: ICD-10-PCS | Mod: S$GLB,,, | Performed by: UROLOGY

## 2023-10-26 RX ORDER — IBUPROFEN 800 MG/1
800 TABLET ORAL EVERY 8 HOURS
COMMUNITY
Start: 2023-10-19

## 2023-10-26 RX ORDER — AMOXICILLIN 875 MG/1
TABLET, FILM COATED ORAL
COMMUNITY

## 2023-10-26 NOTE — PROGRESS NOTES
CHIEF COMPLAINT:    Mrs. Park is a 63 y.o. female presenting for a post op visit, following cystoscopy with Macroplastique injection on 10/10/2023.    PRESENTING ILLNESS:    Brittany aPrk is a 63 y.o. female who returns stating that she is much better.  She had a sneezing fit a few days ago (repeated sneezing) and did not leak.  She is ready to remove the chux pad from the bed.  She still wears a pad in her underwear but it does not get wet.  She still psychologically feels like she needs it but is considering stopping its use.     She has to remember to do timed voiding because she does not feel the need to urinate constantly.       Past Surgical History:   Procedure Laterality Date    ADENOIDECTOMY      APPENDECTOMY      CARPAL TUNNEL RELEASE Bilateral     right 20 years ago; left 10/2015    CHOLECYSTECTOMY      COLONOSCOPY      CYSTOSCOPY N/A 1/8/2019    Procedure: CYSTOSCOPY;  Surgeon: Shannan Jalloh MD;  Location: [address];  Service: Urology;  Laterality: N/A;  30 min    CYSTOSCOPY WITH INJECTION OF PERIURETHRAL BULKING AGENT N/A 1/8/2019    Procedure: CYSTOSCOPY, WITH PERIURETHRAL BULKING AGENT INJECTION MACROPLASTIQUE;  Surgeon: Shannan Jalloh MD;  Location: [address];  Service: Urology;  Laterality: N/A;    CYSTOSCOPY WITH INJECTION OF PERIURETHRAL BULKING AGENT N/A 11/12/2019    Procedure: CYSTOSCOPY, WITH PERIURETHRAL BULKING AGENT INJECTION MACROPLASTIQUE;  Surgeon: Shannan Jalloh MD;  Location: [address];  Service: Urology;  Laterality: N/A;  45 MIN    CYSTOSCOPY WITH INJECTION OF PERIURETHRAL BULKING AGENT N/A 3/28/2023    Procedure: CYSTOSCOPY, WITH PERIURETHRAL BULKING AGENT INJECTION;  Surgeon: Shannan Jalloh MD;  Location: [address];  Service: Urology;  Laterality: N/A;  30 min    CYSTOSCOPY WITH INJECTION OF PERIURETHRAL BULKING AGENT N/A 10/10/2023    Procedure: CYSTOSCOPY, WITH PERIURETHRAL BULKING AGENT INJECTION;  Surgeon: Shannan Jalloh MD;   Location: [facility] OR 1ST FLR;  Service: Urology;  Laterality: N/A;  MACROPLASTIQUE    HYSTERECTOMY      BRETT/BSO    INJECTION OF ANESTHETIC AGENT AROUND MEDIAL BRANCH NERVES INNERVATING LUMBAR FACET JOINT Bilateral 6/28/2019    Procedure: LUMBAR MEDIAL BRANCH NERVE BLOCK (L3,4,5);  Surgeon: Wiliam Keen Jr., MD;  Location: [facility] OR;  Service: Pain Management;  Laterality: Bilateral;    INJECTION OF ANESTHETIC AGENT AROUND MEDIAL BRANCH NERVES INNERVATING LUMBAR FACET JOINT Bilateral 7/19/2019    Procedure: LUMBAR MEDIAL BRANCH NERVE BLOCK (L3,4,5);  Surgeon: Wiliam Keen Jr., MD;  Location: [facility] OR;  Service: Pain Management;  Laterality: Bilateral;    INJECTION OF ANESTHETIC AGENT AROUND MEDIAL BRANCH NERVES INNERVATING LUMBAR FACET JOINT Bilateral 8/23/2019    Procedure: LUMBAR MEDIAL BRANCH NERVE BLOCK (L3,4,5);  Surgeon: Wiliam Keen Jr., MD;  Location: [facility] OR;  Service: Pain Management;  Laterality: Bilateral;    OOPHORECTOMY      SKIN TAG REMOVAL  01/20/2020    TONSILLECTOMY      TRIGGER FINGER RELEASE Right 6/25/2018    Procedure: RELEASE, TRIGGER FINGER - RIGHT RING FINGER;  Surgeon: Violette Broussard MD;  Location: [facility] OR;  Service: Orthopedics;  Laterality: Right;  Stretcher; Supine; Hand Pan 1 & Pan 2    ULNAR NERVE REPAIR      left side    ulner nerve      left side    UPPER GASTROINTESTINAL ENDOSCOPY         Allergies:  Latex, natural rubber; Morphine; and Oxycodone    Medications:  Outpatient Encounter Medications as of 10/26/2023   Medication Sig Dispense Refill    alprazolam (XANAX) 0.25 MG tablet Take 0.25 mg by mouth 2 (two) times daily as needed for Anxiety.      aspirin (ECOTRIN) 81 MG EC tablet Take 81 mg by mouth every evening.       atorvastatin (LIPITOR) 40 MG tablet Take 40 mg by mouth every evening.       CALCIUM CARBONATE/VITAMIN D3 (CALCIUM WITH VITAMIN D ORAL) Take 2 tablets by mouth every morning.       cetirizine 10 mg Cap Take by mouth every morning.       diclofenac sodium (VOLTAREN) 1 % Gel diclofenac 1 % topical gel   APPLY 4 GRAMS TOPICALLY TO BACK UP TO 4 TIMES DAILY AS NEEDED      DULoxetine (CYMBALTA) 60 MG capsule TAKE 1 CAPSULE BY MOUTH ONCE DAILY (Patient taking differently: Take by mouth every evening.) 90 capsule 3    furosemide (LASIX) 20 MG tablet Take 20 mg by mouth 2 (two) times daily.      gabapentin (NEURONTIN) 800 MG tablet TAKE 1 TABLET BY MOUTH THREE TIMES DAILY 90 tablet 3    ketoprofen 25 mg Cap PT STATES THIS IS A CREAM FOR HER FEET C/O NEUROPATHY,NOT A CAPSULE      metformin (GLUCOPHAGE) 500 MG tablet Take 250 mg by mouth daily with breakfast.       metoprolol tartrate (LOPRESSOR) 50 MG tablet Take 50 mg by mouth 2 (two) times daily.       pantoprazole (PROTONIX) 40 MG tablet Take 40 mg by mouth every morning.      semaglutide (OZEMPIC) 0.25 mg or 0.5 mg(2 mg/1.5 mL) pen injector Inject 0.25 mg into the skin every 7 days. WEDNESDAYS      spironolactone (ALDACTONE) 25 MG tablet Take 25 mg by mouth every morning.       No facility-administered encounter medications on file as of 10/26/2023.         PHYSICAL EXAMINATION:    The patient generally appears in good health, is appropriately interactive, and is in no apparent distress.    Skin: No lesions.    Mental: Cooperative with normal affect.    Neuro: Grossly intact.    HEENT: Normal. No evidence of lymphadenopathy.    Chest:  normal inspiratory effort.    Abdomen:  Soft, non-tender. No masses or organomegaly. Bladder is not palpable. No evidence of flank discomfort. No evidence of inguinal hernia.    Extremities: No clubbing, cyanosis, or edema    PVR 0 ml    LABS:    UA 1.020, pH 5, + leuk, tr protein, 1 urobilinogen, otherwise, negative    IMPRESSION:    Post op state    PLAN:    1.  Follow up in 6 months.

## 2023-12-01 ENCOUNTER — HOSPITAL ENCOUNTER (OUTPATIENT)
Dept: RADIOLOGY | Facility: HOSPITAL | Age: 63
Discharge: HOME | End: 2023-12-01
Attending: PHYSICIAN ASSISTANT
Payer: MEDICARE

## 2023-12-01 VITALS — BODY MASS INDEX: 48.52 KG/M2 | WEIGHT: 257 LBS | HEIGHT: 61 IN

## 2023-12-01 DIAGNOSIS — Z12.31 ENCOUNTER FOR SCREENING MAMMOGRAM FOR MALIGNANT NEOPLASM OF BREAST: ICD-10-CM

## 2023-12-01 PROCEDURE — 77067 SCR MAMMO BI INCL CAD: CPT | Mod: TC

## 2023-12-01 PROCEDURE — 77067 MAMMO DIGITAL SCREENING BILAT WITH TOMO: ICD-10-PCS | Mod: 26,,, | Performed by: RADIOLOGY

## 2023-12-01 PROCEDURE — 77063 MAMMO DIGITAL SCREENING BILAT WITH TOMO: ICD-10-PCS | Mod: 26,,, | Performed by: RADIOLOGY

## 2023-12-01 PROCEDURE — 77063 BREAST TOMOSYNTHESIS BI: CPT | Mod: 26,,, | Performed by: RADIOLOGY

## 2023-12-01 PROCEDURE — 77067 SCR MAMMO BI INCL CAD: CPT | Mod: 26,,, | Performed by: RADIOLOGY

## 2023-12-12 ENCOUNTER — TELEPHONE (OUTPATIENT)
Dept: RESEARCH | Facility: OTHER | Age: 63
End: 2023-12-12
Payer: MEDICARE

## 2023-12-12 NOTE — TELEPHONE ENCOUNTER
Study Title: Transcending COVID-[place] barriers to [place] care in [place]: Pragmatic comparative effectiveness trial of evidence-based, on-demand, digital behavioral treatments for chronic pain.  Sponsor:  [facility]   Study: [facility] Digital Pain Treatment Study - Transcending COVID-19 barriers to pain care in [place]: Pragmatic comparative effectiveness trial of evidence-based, on-demand, digital behavioral treatments for chronic pain.   IRB/Protocol #: 2021.177 - Phase 2?  Study#([facility]):  [id number]  Principle Investigator - Brandon Peace   Sub-Investigator - Long Azul   Sponsor:  [facility] Screening Interest in Study Call    Attempt #: 1, 2, 3+: 1      1. Contact Made: []Yes [x]No   1A. If yes, contact date:   1B. If no, date of final contact attempt:   1C. If no, reason(s) contact not made:  []Wrong number []No response [x]Other   1D. If other, please specify: left message    2. Verbal commitment: []Yes  [x]No  2A. If yes, verbal commitment date:   2B. If no, reason: []Exclusion Criteria Met  []Desire not to participate []No reason provided [x]Other  2B1. If Type of Exclusion Criteria Met or other reason, specify: left message    I left a detailed message for the subject to call the office back at [phone]. This is my first attempt in reaching the subject.

## 2023-12-18 ENCOUNTER — TELEPHONE (OUTPATIENT)
Dept: RESEARCH | Facility: OTHER | Age: 63
End: 2023-12-18
Payer: MEDICARE

## 2023-12-18 NOTE — TELEPHONE ENCOUNTER
Study Title: Transcending COVID-19 barriers to pain care in [place]: Pragmatic comparative effectiveness trial of evidence-based, on-demand, digital behavioral treatments for chronic pain.  Sponsor:  [facility]   Study: [facility] Digital Pain Treatment Study - Transcending COVID-19 barriers to pain care in [place]: Pragmatic comparative effectiveness trial of evidence-based, on-demand, digital behavioral treatments for chronic pain.   IRB/Protocol #: 2021.177 - Phase 2?  Study#([facility]):  [id number]  Principle Investigator - Brandon Peace   Sub-Investigator - Long Azul   Sponsor:  [facility] Screening Interest in Study Call    Attempt #: 1, 2, 3+: 2      1. Contact Made: []Yes [x]No   1A. If yes, contact date:   1B. If no, date of final contact attempt:   1C. If no, reason(s) contact not made:  []Wrong number []No response [x]Other   1D. If other, please specify: left voicemail    2. Verbal commitment: []Yes  [x]No  2A. If yes, verbal commitment date:   2B. If no, reason: []Exclusion Criteria Met  []Desire not to participate []No reason provided [x]Other  2B1. If Type of Exclusion Criteria Met or other reason, specify: left voicemail    I left a detailed message for the [facility] to call the office back at [phone]. This was my second attempt to reach the subject.

## 2023-12-27 ENCOUNTER — TELEPHONE (OUTPATIENT)
Dept: RESEARCH | Facility: OTHER | Age: 63
End: 2023-12-27
Payer: MEDICARE

## 2023-12-27 NOTE — TELEPHONE ENCOUNTER
Study Title: Transcending COVID-19 barriers to pain care in [place]: Pragmatic comparative effectiveness trial of evidence-based, on-demand, digital behavioral treatments for chronic pain.  Sponsor:  [facility]   Study: [facility] Digital Pain Treatment Study - Transcending COVID-19 barriers to pain care in [place]: Pragmatic comparative effectiveness trial of evidence-based, on-demand, digital behavioral treatments for chronic pain.   IRB/Protocol #: 2021.177 - Phase 2?  Study#([facility]):  [id number]  Principle Investigator - Brandon Peace   Sub-Investigator - Long Azul   Sponsor:  [facility] Screening Interest in Study Call    Attempt #: 1, 2, 3+: 3+      1. Contact Made: []Yes [x]No   1A. If yes, contact date:   1B. If no, date of final contact attempt: [id number]  1C. If no, reason(s) contact not made:  []Wrong number [x]No response [x]Other   1D. If other, please specify: left voi    2. Verbal commitment: []Yes  [x]No  2A. If yes, verbal commitment date:   2B. If no, reason: []Exclusion Criteria Met  []Desire not to participate []No reason provided [x]Other  2B1. If Type of Exclusion Criteria Met or other reason, specify: left voicemail/no response    I left a detailed message for the subject to call the office back at [phone]. This is my third & final phone call for this subject.

## 2024-01-02 ENCOUNTER — TELEPHONE (OUTPATIENT)
Dept: RESEARCH | Facility: OTHER | Age: 64
End: 2024-01-02

## 2024-01-02 NOTE — TELEPHONE ENCOUNTER
Study Title: Transcending COVID-19 barriers to pain care in [place]: Pragmatic comparative effectiveness trial of evidence-based, on-demand, digital behavioral treatments for chronic pain.  Sponsor:  [facility]   Study: [facility] Digital Pain Treatment Study - Transcending COVID-19 barriers to pain care in [place]: Pragmatic comparative effectiveness trial of evidence-based, on-demand, digital behavioral treatments for chronic pain.   IRB/Protocol #: 2021.177 - Phase 2?  Study#([facility]):  [id number]  Principle Investigator - Brandon Peace   Sub-Investigator - Long Azul   Sponsor:  [facility] Screening Interest in Study Call    Attempt #: 1, 2, 3+: 3+      1. Contact Made: [x]Yes []No   1A. If yes, contact date: [id number]  1B. If no, date of final contact attempt:   1C. If no, reason(s) contact not made:  []Wrong number []No response []Other   1D. If other, please specify:     2. Verbal commitment: [x]Yes  []No  2A. If yes, verbal commitment date: [id number]  2B. If no, reason: []Exclusion Criteria Met  []Desire not to participate []No reason provided []Other  2B1. If Type of Exclusion Criteria Met or other reason, specify:     If patient expressed interest, patient's information will be forwarded to Libra Moss, Edi Kevin, or Betty Cheung.

## 2024-01-04 ENCOUNTER — RESEARCH ENCOUNTER (OUTPATIENT)
Dept: RESEARCH | Facility: OTHER | Age: 64
End: 2024-01-04

## 2024-01-04 NOTE — PROGRESS NOTES
Study Title: Transcending COVID-19 barriers to pain care in [place]: Pragmatic comparative effectiveness trial of evidence-based, on-demand, digital behavioral treatments for chronic pain.  Sponsor:  [facility]   Study: [facility] Digital Pain Treatment Study - Transcending COVID-19 barriers to pain care in [place]: Pragmatic comparative effectiveness trial of evidence-based, on-demand, digital behavioral treatments for chronic pain.   IRB/Protocol #: 2021.177 - Phase 2?  Study#([facility]):  [id number]  Principle Investigator - Brandon Peace   Sub-Investigator - Long Azul   Subject Claudy's Study Number: [id number]  Sponsor:  [facility]      Date: 04/Jan/2024       Informed Consent    Subject was consented via phone to be evaluated for the NIH Digital Pain Treatment Research Study eligibility. Consent was completed using the most current IRB approved version of the ICF dated 11-AUG-2022 by myself and patient was given ample time to review consent prior to execution of the informed consent. Informed Consent sent vis e-mail for subject to sign.         Prior to the Informed Consent (IC) being signed, or any study protocol required data collection, testing, procedure, or intervention being performed, the following was done and/or discussed:    Purpose of the study and qualifications to participate;   Study design, follow up schedule;  Risk, Benefits, Alternative Treatments, Compensation and Costs;  Participation in the research trial is voluntary and patient may withdraw at anytime;  Contact information for study related questions.    Patient verbalized understanding of the above: yes  Contact information for CRC and PI given to patient: yes    Pt verbally agreed to being a participant in the NIH Digital Pain Treatment research study with an IRB approved consent being read to the participant.  Patient consent was reviewed with patient and all questions answered satisfactorily.      Patient answered questions related to  their pain and lifestyle questions.Subject meets all inclusion criteria and no exclusion criteria.    Subject was provide with staff contact information should they have any questions and concerns.

## 2024-11-19 ENCOUNTER — OFFICE VISIT (OUTPATIENT)
Dept: UROLOGY | Facility: CLINIC | Age: 64
End: 2024-11-19
Payer: MEDICARE

## 2024-11-19 VITALS
SYSTOLIC BLOOD PRESSURE: 118 MMHG | DIASTOLIC BLOOD PRESSURE: 78 MMHG | HEART RATE: 73 BPM | BODY MASS INDEX: 46.53 KG/M2 | WEIGHT: 246.25 LBS

## 2024-11-19 DIAGNOSIS — N39.0 RECURRENT UTI: Primary | ICD-10-CM

## 2024-11-19 DIAGNOSIS — N95.8 GENITOURINARY SYNDROME OF MENOPAUSE: ICD-10-CM

## 2024-11-19 PROCEDURE — 87086 URINE CULTURE/COLONY COUNT: CPT | Performed by: UROLOGY

## 2024-11-19 PROCEDURE — 3288F FALL RISK ASSESSMENT DOCD: CPT | Mod: CPTII,S$GLB,, | Performed by: UROLOGY

## 2024-11-19 PROCEDURE — 99215 OFFICE O/P EST HI 40 MIN: CPT | Mod: S$GLB,,, | Performed by: UROLOGY

## 2024-11-19 PROCEDURE — 3078F DIAST BP <80 MM HG: CPT | Mod: CPTII,S$GLB,, | Performed by: UROLOGY

## 2024-11-19 PROCEDURE — 1125F AMNT PAIN NOTED PAIN PRSNT: CPT | Mod: CPTII,S$GLB,, | Performed by: UROLOGY

## 2024-11-19 PROCEDURE — 87186 SC STD MICRODIL/AGAR DIL: CPT | Performed by: UROLOGY

## 2024-11-19 PROCEDURE — 1159F MED LIST DOCD IN RCRD: CPT | Mod: CPTII,S$GLB,, | Performed by: UROLOGY

## 2024-11-19 PROCEDURE — 3074F SYST BP LT 130 MM HG: CPT | Mod: CPTII,S$GLB,, | Performed by: UROLOGY

## 2024-11-19 PROCEDURE — 87088 URINE BACTERIA CULTURE: CPT | Performed by: UROLOGY

## 2024-11-19 PROCEDURE — 81002 URINALYSIS NONAUTO W/O SCOPE: CPT | Mod: S$GLB,,, | Performed by: UROLOGY

## 2024-11-19 PROCEDURE — 3008F BODY MASS INDEX DOCD: CPT | Mod: CPTII,S$GLB,, | Performed by: UROLOGY

## 2024-11-19 PROCEDURE — 99999 PR PBB SHADOW E&M-EST. PATIENT-LVL III: CPT | Mod: PBBFAC,,, | Performed by: UROLOGY

## 2024-11-19 PROCEDURE — 1101F PT FALLS ASSESS-DOCD LE1/YR: CPT | Mod: CPTII,S$GLB,, | Performed by: UROLOGY

## 2024-11-19 RX ORDER — ESTRADIOL 0.1 MG/G
1 CREAM VAGINAL
Qty: 42.5 G | Refills: 3 | Status: SHIPPED | OUTPATIENT
Start: 2024-11-20 | End: 2025-11-20

## 2024-11-19 RX ORDER — LIDOCAINE HYDROCHLORIDE 20 MG/ML
JELLY TOPICAL ONCE
OUTPATIENT
Start: 2024-11-19 | End: 2024-11-19

## 2024-11-19 RX ORDER — DOXYCYCLINE HYCLATE 100 MG
100 TABLET ORAL ONCE
OUTPATIENT
Start: 2024-11-19 | End: 2024-11-19

## 2024-11-19 NOTE — PROGRESS NOTES
CHIEF COMPLAINT:    Mrs. Park is a 64 y.o. female presenting for a  visit for recurrent UTI.    PRESENTING ILLNESS:    Brittany Park is a 64 y.o. female who presents with a history of stress urinary incontinence.  She is status post cystoscopy with injection of urethral bulking agent (Macroplastique) she states that she has had recurrent bladder infections which have resulted in different organisms each time.  The results are not available in [facility] or care everywhere.  She gets them done at [facility].     She manifests with dysuria, malodorous urine. No fevers, chills or flank tenderness.  She has lower back pain when she has an infection.  The last time she was on antibiotics was 2 weeks ago.  She states that she feels better while getting treatment but once she stops the abx, symptoms resume.      Culture Proven (yes/no): yes    Risk factors:     Bowel movements:  sometimes she had difficulty starting to have a bowel movement.  Now she can go 2-3 days between bowel movements.  She will take Miralax.  Feels like it gets stuck.  No splinting.  Started 4 months ago.     Menopausal status:    Using Vaginal Estrogen:  not using    Reason why not using Vaginal Estrogen:   Sexually active:  not sexually active, no partner   fluid intake:  has a 40 oz cup, fills 4 times a day (with ice)   Pad use:  long pads, liner.  She uses them only because of the odor.      Preventative measures:  drinks cranberry       REVIEW OF SYSTEMS:    Review of Systems   Constitutional: Negative.    HENT: Negative.     Eyes: Negative.    Respiratory: Negative.     Cardiovascular: Negative.    Gastrointestinal: Negative.    Genitourinary:  Positive for dysuria.   Musculoskeletal: Negative.    Skin: Negative.    Neurological: Negative.    Endo/Heme/Allergies: Negative.    Psychiatric/Behavioral: Negative.         PATIENT HISTORY:    Past Medical History:   Diagnosis Date    Acid reflux     Anxiety     Edema     General  anesthetics causing adverse effect in therapeutic use     slow to awaken    Hypertension     FER on CPAP     Other and unspecified hyperlipidemia     Prediabetes     Urinary incontinence        Past Surgical History:   Procedure Laterality Date    ADENOIDECTOMY      APPENDECTOMY      CARPAL TUNNEL RELEASE Bilateral     right 20 years ago; left 10/2015    CHOLECYSTECTOMY      COLONOSCOPY      CYSTOSCOPY N/A 1/8/2019    Procedure: CYSTOSCOPY;  Surgeon: Shannan Jalloh MD;  Location: [address];  Service: Urology;  Laterality: N/A;  30 min    CYSTOSCOPY WITH INJECTION OF PERIURETHRAL BULKING AGENT N/A 1/8/2019    Procedure: CYSTOSCOPY, WITH PERIURETHRAL BULKING AGENT INJECTION MACROPLASTIQUE;  Surgeon: Shannan Jalloh MD;  Location: [address];  Service: Urology;  Laterality: N/A;    CYSTOSCOPY WITH INJECTION OF PERIURETHRAL BULKING AGENT N/A 11/12/2019    Procedure: CYSTOSCOPY, WITH PERIURETHRAL BULKING AGENT INJECTION MACROPLASTIQUE;  Surgeon: Shannan Jalloh MD;  Location: [address];  Service: Urology;  Laterality: N/A;  45 MIN    CYSTOSCOPY WITH INJECTION OF PERIURETHRAL BULKING AGENT N/A 3/28/2023    Procedure: CYSTOSCOPY, WITH PERIURETHRAL BULKING AGENT INJECTION;  Surgeon: Shannan Jalloh MD;  Location: [address];  Service: Urology;  Laterality: N/A;  30 min    CYSTOSCOPY WITH INJECTION OF PERIURETHRAL BULKING AGENT N/A 10/10/2023    Procedure: CYSTOSCOPY, WITH PERIURETHRAL BULKING AGENT INJECTION;  Surgeon: Shannan Jalloh MD;  Location: [address];  Service: Urology;  Laterality: N/A;  MACROPLASTIQUE    HYSTERECTOMY      BRETT/BSO    INJECTION OF ANESTHETIC AGENT AROUND MEDIAL BRANCH NERVES INNERVATING LUMBAR FACET JOINT Bilateral 6/28/2019    Procedure: LUMBAR MEDIAL BRANCH NERVE BLOCK (L3,4,5);  Surgeon: Wiliam Keen Jr., MD;  Location: [facility];  Service: Pain Management;  Laterality: Bilateral;    INJECTION OF ANESTHETIC AGENT AROUND MEDIAL BRANCH NERVES  INNERVATING LUMBAR FACET JOINT Bilateral 7/19/2019    Procedure: LUMBAR MEDIAL BRANCH NERVE BLOCK (L3,4,5);  Surgeon: Wiliam Keen Jr., MD;  Location: [facility] OR;  Service: Pain Management;  Laterality: Bilateral;    INJECTION OF ANESTHETIC AGENT AROUND MEDIAL BRANCH NERVES INNERVATING LUMBAR FACET JOINT Bilateral 8/23/2019    Procedure: LUMBAR MEDIAL BRANCH NERVE BLOCK (L3,4,5);  Surgeon: Wiliam Keen Jr., MD;  Location: [facility] OR;  Service: Pain Management;  Laterality: Bilateral;    OOPHORECTOMY      SKIN TAG REMOVAL  01/20/2020    TONSILLECTOMY      TRIGGER FINGER RELEASE Right 6/25/2018    Procedure: RELEASE, TRIGGER FINGER - RIGHT RING FINGER;  Surgeon: Violette Broussard MD;  Location: [facility];  Service: Orthopedics;  Laterality: Right;  Stretcher; Supine; Hand Pan 1 & Pan 2    ULNAR NERVE REPAIR      left side    ulner nerve      left side    UPPER GASTROINTESTINAL ENDOSCOPY         Family History   Problem Relation Name Age of Onset    Stroke Father      Diabetes Mother      Stroke Mother      Diabetes Brother       Social History     Socioeconomic History    Marital status:    Tobacco Use    Smoking status: Former    Smokeless tobacco: Never    Tobacco comments:     quit 20 years ago   Substance and Sexual Activity    Alcohol use: No    Drug use: No    Sexual activity: Not Currently     Birth control/protection: Surgical     Comment:        Allergies:  Latex, natural rubber; Morphine; and Oxycodone    Medications:  Outpatient Encounter Medications as of 11/19/2024   Medication Sig Dispense Refill    alprazolam (XANAX) 0.25 MG tablet Take 0.25 mg by mouth 2 (two) times daily as needed for Anxiety.      amoxicillin (AMOXIL) 875 MG tablet TAKE 1 TABLET BY MOUTH EVERY 12 HOURS FOR 7 DAYS      aspirin (ECOTRIN) 81 MG EC tablet Take 81 mg by mouth every evening.       atorvastatin (LIPITOR) 40 MG tablet Take 40 mg by mouth every evening.       CALCIUM CARBONATE/VITAMIN D3 (CALCIUM WITH  VITAMIN D ORAL) Take 2 tablets by mouth every morning.       cetirizine 10 mg Cap Take by mouth every morning.      diclofenac sodium (VOLTAREN) 1 % Gel diclofenac 1 % topical gel   APPLY 4 GRAMS TOPICALLY TO BACK UP TO 4 TIMES DAILY AS NEEDED      DULoxetine (CYMBALTA) 60 MG capsule TAKE 1 CAPSULE BY MOUTH ONCE DAILY (Patient taking differently: Take by mouth every evening.) 90 capsule 3    furosemide (LASIX) 20 MG tablet Take 20 mg by mouth 2 (two) times daily.      gabapentin (NEURONTIN) 800 MG tablet TAKE 1 TABLET BY MOUTH THREE TIMES DAILY 90 tablet 3    ibuprofen (ADVIL,MOTRIN) 800 MG tablet Take 800 mg by mouth every 8 (eight) hours.      ketoprofen 25 mg Cap PT STATES THIS IS A CREAM FOR HER FEET C/O NEUROPATHY,NOT A CAPSULE      metformin (GLUCOPHAGE) 500 MG tablet Take 250 mg by mouth daily with breakfast.       metoprolol tartrate (LOPRESSOR) 50 MG tablet Take 50 mg by mouth 2 (two) times daily.       pantoprazole (PROTONIX) 40 MG tablet Take 40 mg by mouth every morning.      semaglutide (OZEMPIC) 0.25 mg or 0.5 mg(2 mg/1.5 mL) pen injector Inject 0.25 mg into the skin every 7 days. WEDNESDAYS      spironolactone (ALDACTONE) 25 MG tablet Take 25 mg by mouth every morning.       No facility-administered encounter medications on file as of 11/19/2024.         PHYSICAL EXAMINATION:    The patient generally appears in good health, is appropriately interactive, and is in no apparent distress.    Skin: No lesions.    Mental: Cooperative with normal affect.    Neuro: Grossly intact.    HEENT: Normal. No evidence of lymphadenopathy.    Chest:  normal inspiratory effort.    Abdomen: Soft, non-tender. No masses or organomegaly. Bladder is not palpable. No evidence of flank discomfort. No evidence of inguinal hernia.    Extremities: No clubbing, cyanosis, or edema    NOTE:  the exam was carried out with a nurse chaperone present  Normal external female genitalia  Urethral meatus is normal  Urethra and bladder are  nontender to bimanual exam  Well supported anteriorly and   Posteriorly, she has a rectocele   Uterus and cervix are normal  No adnexal masses    LABS:    Lab Results   Component Value Date    BUN 14 11/12/2019    CREATININE 0.7 11/12/2019       UA 1.015, pH 6, tr leuk, tr blood, otherwise, negative.     IMPRESSION:    Recurrent UTI  Rectocele  Genitourinary syndrome of menopause (GSM)      PLAN:    1.  The catheterized specimen was sent for culture  2.  CT RSS as an ultrasound may not get the best image due to her body habitus  3.  Cystoscopy  4.  Has a rectocele which can contribute to the stool trapping  5.  Estrace cream for the GSM.  Handout for GSM provided  6.  Asked that she bring her culture results to the next appointment.     I spent a total of 40 minutes on the day of the visit.  This includes face to face time and non-face to face time preparing to see the patient (eg, review of tests), obtaining and/or reviewing separately obtained history, documenting clinical information in the electronic or other health record, independently interpreting results and communicating results to the patient/family/caregiver, or care coordinator.

## 2024-11-20 ENCOUNTER — TELEPHONE (OUTPATIENT)
Dept: UROLOGY | Facility: CLINIC | Age: 64
End: 2024-11-20
Payer: MEDICARE

## 2024-11-20 NOTE — TELEPHONE ENCOUNTER
----- Message from Med Assistant Aguero sent at 11/19/2024  3:13 PM CST -----  Regarding: Cysto  Good afternoon,    Can you please schedule the pt's a cysto?

## 2024-11-22 LAB — BACTERIA UR CULT: ABNORMAL

## 2024-11-25 ENCOUNTER — TELEPHONE (OUTPATIENT)
Dept: UROLOGY | Facility: CLINIC | Age: 64
End: 2024-11-25
Payer: MEDICARE

## 2024-11-25 DIAGNOSIS — N39.0 RECURRENT UTI: Primary | ICD-10-CM

## 2024-11-25 RX ORDER — CEFDINIR 300 MG/1
300 CAPSULE ORAL 2 TIMES DAILY
Qty: 14 CAPSULE | Refills: 0 | Status: SHIPPED | OUTPATIENT
Start: 2024-11-25 | End: 2024-12-02

## 2024-11-26 ENCOUNTER — HOSPITAL ENCOUNTER (OUTPATIENT)
Dept: RADIOLOGY | Facility: HOSPITAL | Age: 64
Discharge: HOME | End: 2024-11-26
Attending: UROLOGY
Payer: MEDICARE

## 2024-11-26 DIAGNOSIS — N39.0 RECURRENT UTI: ICD-10-CM

## 2024-11-26 PROCEDURE — 74176 CT ABD & PELVIS W/O CONTRAST: CPT | Mod: TC

## 2024-11-26 PROCEDURE — 74176 CT ABD & PELVIS W/O CONTRAST: CPT | Mod: 26,,, | Performed by: RADIOLOGY

## 2024-11-26 NOTE — TELEPHONE ENCOUNTER
Cefdinir was sent for a positive urine culture to [facility] of the [facility] pharmacy

## 2024-12-09 ENCOUNTER — PROCEDURE VISIT (OUTPATIENT)
Facility: CLINIC | Age: 64
End: 2024-12-09
Payer: MEDICARE

## 2024-12-09 VITALS
WEIGHT: 248.25 LBS | SYSTOLIC BLOOD PRESSURE: 138 MMHG | BODY MASS INDEX: 46.87 KG/M2 | TEMPERATURE: 97 F | DIASTOLIC BLOOD PRESSURE: 70 MMHG | HEART RATE: 67 BPM | HEIGHT: 61 IN | RESPIRATION RATE: 18 BRPM

## 2024-12-09 DIAGNOSIS — N39.0 RECURRENT UTI: ICD-10-CM

## 2024-12-09 PROCEDURE — 52000 CYSTOURETHROSCOPY: CPT | Mod: S$GLB,,, | Performed by: UROLOGY

## 2024-12-09 RX ORDER — TIRZEPATIDE 7.5 MG/.5ML
7.5 INJECTION, SOLUTION SUBCUTANEOUS
COMMUNITY

## 2024-12-09 RX ORDER — DOXYCYCLINE HYCLATE 100 MG
100 TABLET ORAL ONCE
Status: COMPLETED | OUTPATIENT
Start: 2024-12-09 | End: 2024-12-09

## 2024-12-09 RX ORDER — LIDOCAINE HYDROCHLORIDE 20 MG/ML
JELLY TOPICAL ONCE
Status: COMPLETED | OUTPATIENT
Start: 2024-12-09 | End: 2024-12-09

## 2024-12-09 RX ADMIN — LIDOCAINE HYDROCHLORIDE 5 ML: 20 JELLY TOPICAL at 09:12

## 2024-12-09 RX ADMIN — Medication 100 MG: at 09:12

## 2024-12-09 NOTE — PROCEDURES
"Procedures    CYSTOSCOPY REPORT    Pre Procedure Diagnosis:  recurrent UTI after urethral bulking agent     Post Procedure Diagnosis:  round nodules at the bladder neck consistent with cystitis    Anesthesia: 10 cc 2% lidocaine jelly applied per urethra.    14 FR Flexible Olympus cystoscope used.    FINDINGS:  Dome, anterior, posterior, lateral walls and bladder base free of urothelial abnormalities. Right and left ureteral orifices in the normal postion and configuration, both effluxed clear urine.  Bladder neck and urethra were normal. The bulking agent is intact, there is good coaptation of the urethra.     Specimen:  none    The patient was taken to the cystoscopy suite and placed in dorsal lithotomy position.  The genitalia was prepped and draped  in the usual sterile fashion.  Time out was performed.  Two percent lidocaine jelly was inserted in the urethra.  After sufficent time had passed to allow good local anesthesia, the cystoscope was inserted in the urethra and passed into the bladder visualizing the urethra along its entire course.  The dome, anterior, posterior and lateral walls were examined systematically.  The ureteral orifices were in their usual position and configuration.  The cystoscope was turned upon itself 180 degrees to visualize the bladder neck.  The cystoscope was then brought to the level of the bladder neck, the water was turned on and the urethra was visualized.  The cystoscope was removed and the patient was instructed to urinate prior to leaving the office.     Post procedure medication:  doxycycline 100 mg x 1    ADDITIONAL NOTES:  11/26/2024 the periurethral "abnormality" is the urethral bulking agent.  Discussed with the patient.       ASSESSMENT/PLAN:  64 year old woman status post flexible cystoscopy.  1. Push fluids for 24 hours.  2. May see blood in the urine, this should gradually improve over the next 2-3 days.  3. The patient was instructed to return to the office or go to " the emergency should fever, chills, cloudy urine, or inability to urinate develop.  4. Follow up as needed.

## 2024-12-09 NOTE — PATIENT INSTRUCTIONS
What to Expect After a Cystoscopy  For the next 24-48 hours, you may feel a mild burning when you urinate. This burning is normal and expected. Drink plenty of water to dilute the urine to help relieve the burning sensation. You may also see a small amount of blood in your urine after the procedure.    Unless you are already taking antibiotics, you may be given an antibiotic after the test to prevent infection.    Signs and Symptoms to Report  Call the [facility] at [phone] if you develop any of the following:  Fever of 101 degrees or higher  Chills or persistent bleeding  Inability to urinate

## 2025-02-11 ENCOUNTER — HOSPITAL ENCOUNTER (OUTPATIENT)
Dept: RADIOLOGY | Facility: HOSPITAL | Age: 65
Discharge: HOME | End: 2025-02-11
Attending: INTERNAL MEDICINE
Payer: MEDICARE

## 2025-02-11 DIAGNOSIS — R07.9 CHEST PAIN: ICD-10-CM

## 2025-02-11 DIAGNOSIS — J96.00 ACUTE RESPIRATORY FAILURE: ICD-10-CM

## 2025-02-11 DIAGNOSIS — R07.9 CHEST PAIN: Primary | ICD-10-CM

## 2025-02-11 DIAGNOSIS — J96.00 ACUTE RESPIRATORY FAILURE: Primary | ICD-10-CM

## 2025-02-11 PROCEDURE — 71275 CT ANGIOGRAPHY CHEST: CPT | Mod: 26,,, | Performed by: RADIOLOGY

## 2025-02-11 PROCEDURE — 71275 CT ANGIOGRAPHY CHEST: CPT | Mod: TC

## 2025-02-11 PROCEDURE — 25500020 PHARM REV CODE 255: Performed by: INTERNAL MEDICINE

## 2025-02-11 RX ADMIN — IOHEXOL 100 ML: 350 INJECTION, SOLUTION INTRAVENOUS at 03:02

## 2025-02-17 ENCOUNTER — HOSPITAL ENCOUNTER (OUTPATIENT)
Dept: PULMONOLOGY | Facility: HOSPITAL | Age: 65
Discharge: HOME | End: 2025-02-17
Attending: INTERNAL MEDICINE
Payer: MEDICARE

## 2025-02-17 DIAGNOSIS — R07.9 CHEST PAIN: ICD-10-CM

## 2025-02-17 LAB — RA PRESSURE ESTIMATED: 3 MMHG

## 2025-02-17 PROCEDURE — 93306 TTE W/DOPPLER COMPLETE: CPT

## 2025-03-18 DIAGNOSIS — J44.9 COPD (CHRONIC OBSTRUCTIVE PULMONARY DISEASE): Primary | ICD-10-CM

## 2025-03-25 ENCOUNTER — HOSPITAL ENCOUNTER (OUTPATIENT)
Dept: PULMONOLOGY | Facility: HOSPITAL | Age: 65
Discharge: HOME | End: 2025-03-25
Attending: INTERNAL MEDICINE
Payer: MEDICARE

## 2025-03-25 DIAGNOSIS — J44.9 COPD (CHRONIC OBSTRUCTIVE PULMONARY DISEASE): ICD-10-CM

## 2025-03-25 PROCEDURE — 94729 DIFFUSING CAPACITY: CPT

## 2025-03-25 PROCEDURE — 94060 EVALUATION OF WHEEZING: CPT

## 2025-03-25 PROCEDURE — 99900035 HC TECH TIME PER 15 MIN (STAT)

## 2025-03-25 PROCEDURE — 94727 GAS DIL/WSHOT DETER LNG VOL: CPT

## 2025-03-25 PROCEDURE — 99900031 HC PATIENT EDUCATION (STAT)

## 2025-03-25 PROCEDURE — 27100098 HC SPACER

## 2025-03-26 LAB
BRPFT: ABNORMAL
DLCO SINGLE BREATH LLN: 14.13
DLCO SINGLE BREATH PRE REF: 125 %
DLCO SINGLE BREATH REF: 19.86
DLCOC SBVA LLN: 2.84
DLCOC SBVA REF: 4.48
DLCOC SINGLE BREATH LLN: 14.13
DLCOC SINGLE BREATH REF: 19.86
DLCOVA LLN: 2.84
DLCOVA PRE REF: 91.5 %
DLCOVA PRE: 4.1 ML/(MIN*MMHG*L) (ref 2.84–6.11)
DLCOVA REF: 4.48
ERVN2 LLN: -16449.34
ERVN2 PRE REF: 25.2 %
ERVN2 PRE: 0.17 L (ref -16449.34–16450.66)
ERVN2 REF: 0.66
FEF 25 75 CHG: 4.1 %
FEF 25 75 LLN: 1.18
FEF 25 75 POST REF: 79.9 %
FEF 25 75 PRE REF: 76.7 %
FEF 25 75 REF: 2.58
FET100 CHG: -1.6 %
FEV1 CHG: 0.9 %
FEV1 FVC CHG: -1.9 %
FEV1 FVC LLN: 66
FEV1 FVC POST REF: 100.5 %
FEV1 FVC PRE REF: 102.4 %
FEV1 FVC REF: 79
FEV1 LLN: 1.53
FEV1 POST REF: 102.2 %
FEV1 PRE REF: 101.2 %
FEV1 REF: 2.07
FRCN2 LLN: 1.72
FRCN2 PRE REF: 51.5 %
FRCN2 REF: 2.54
FVC CHG: 2.9 %
FVC LLN: 1.95
FVC POST REF: 101.1 %
FVC PRE REF: 98.3 %
FVC REF: 2.64
IVC PRE: 2.4 L (ref 1.95–3.36)
IVC SINGLE BREATH LLN: 1.95
IVC SINGLE BREATH PRE REF: 90.7 %
IVC SINGLE BREATH REF: 2.64
MEP LLN: 63
MEP PRE REF: 52.5 %
MEP PRE: 41.97 CMH2O (ref 63.23–96.78)
MEP REF: 80
MIP LLN: 33
MIP PRE REF: 117.5 %
MIP PRE: 58.76 CMH2O (ref 33.23–66.78)
MIP REF: 50
MVV LLN: 63
MVV PRE REF: 99.5 %
MVV REF: 74
PEF CHG: 36.1 %
PEF LLN: 3.86
PEF POST REF: 93.8 %
PEF PRE REF: 68.9 %
PEF REF: 5.41
POST FEF 25 75: 2.06 L/S (ref 1.18–3.98)
POST FET 100: 7.78 SEC
POST FEV1 FVC: 79.31 % (ref 65.95–90.01)
POST FEV1: 2.12 L (ref 1.53–2.6)
POST FVC: 2.67 L (ref 1.95–3.36)
POST PEF: 5.08 L/S (ref 3.86–6.97)
PRE DLCO: 24.82 ML/(MIN*MMHG) (ref 14.13–25.59)
PRE FEF 25 75: 1.98 L/S (ref 1.18–3.98)
PRE FET 100: 7.91 SEC
PRE FEV1 FVC: 80.8 % (ref 65.95–90.01)
PRE FEV1: 2.1 L (ref 1.53–2.6)
PRE FRC N2: 1.31 L (ref 1.72–3.36)
PRE FVC: 2.6 L (ref 1.95–3.36)
PRE MVV: 73.4 L/MIN (ref 62.73–84.88)
PRE PEF: 3.73 L/S (ref 3.86–6.97)
RVN2 LLN: 1.3
RVN2 PRE REF: 60.8 %
RVN2 PRE: 1.14 L (ref 1.3–2.45)
RVN2 REF: 1.88
RVN2TLCN2 LLN: 32.15
RVN2TLCN2 PRE REF: 70 %
RVN2TLCN2 PRE: 29.23 % (ref 32.15–51.33)
RVN2TLCN2 REF: 41.74
TLCN2 LLN: 3.45
TLCN2 PRE REF: 88 %
TLCN2 PRE: 3.9 L (ref 3.45–5.42)
TLCN2 REF: 4.44
VA PRE: 6.06 L (ref 4.29–4.29)
VA SINGLE BREATH LLN: 4.29
VA SINGLE BREATH PRE REF: 141.3 %
VA SINGLE BREATH REF: 4.29
VCMAXN2 LLN: 1.95
VCMAXN2 PRE REF: 104.6 %
VCMAXN2 PRE: 2.76 L (ref 1.95–3.36)
VCMAXN2 REF: 2.64

## (undated) DEVICE — SET CYSTO IRR DRP CHMBR 84IN

## (undated) DEVICE — GLOVE BIOGEL SKINSENSE PI 7.5

## (undated) DEVICE — TRAY CYSTO BASIN OMC

## (undated) DEVICE — TRAY CYSTO BASIN

## (undated) DEVICE — SEE MEDLINE ITEM 153688

## (undated) DEVICE — GLOVE BIOGEL 7.5

## (undated) DEVICE — NDL MACROPLASTIQUE

## (undated) DEVICE — TOURNIQUET SB QC DP 18X4IN

## (undated) DEVICE — SPONGE GAUZE 16PLY 4X4

## (undated) DEVICE — SYR B-D DISP CONTROL 10CC100/C

## (undated) DEVICE — NDL SAFETY 22G X 1.5 ECLIPSE

## (undated) DEVICE — UNDERGLOVES BIOGEL PI SZ 7 LF

## (undated) DEVICE — BANDAGE KERLIX AMD

## (undated) DEVICE — UNDERGLOVES BIOGEL PI SIZE 7.5

## (undated) DEVICE — NDL SPINAL SPINOCAN 22GX3.5

## (undated) DEVICE — SKIN MARKER DEVON 160

## (undated) DEVICE — DRESSING N ADH OIL EMUL 3X3

## (undated) DEVICE — SYR 10CC LUER LOCK

## (undated) DEVICE — GOWN SMARTGOWN LVL4 X-LONG XL

## (undated) DEVICE — SPONGE DERMACEA GAUZE 4X4

## (undated) DEVICE — NDL SPINAL 22GX5

## (undated) DEVICE — STOCKINET 4INX48

## (undated) DEVICE — APPLICATOR CHLORAPREP ORN 26ML

## (undated) DEVICE — GLOVE BIOGEL SKINSENSE PI 7.0

## (undated) DEVICE — GAUZE SPONGE 4'X4 12 PLY

## (undated) DEVICE — PAD CAST SPECIALIST STRL 4

## (undated) DEVICE — SET CYSTO IRRIGATION UNIV SPIK

## (undated) DEVICE — CORD BIPOLAR 12 FT STERILE

## (undated) DEVICE — FORCEP STRAIGHT DISP

## (undated) DEVICE — DRUG BACITRACIN UNGT OINTMENT

## (undated) DEVICE — ADAPTER HOSE 10FT 8MM

## (undated) DEVICE — NEEDLE SPINAL CLR HUB 22GX3

## (undated) DEVICE — CORD BIPOLAR ELECTROSURGICAL

## (undated) DEVICE — SOL IRR WATER STRL 3000 ML

## (undated) DEVICE — TOURNIQUET SB QC SP 18X4IN

## (undated) DEVICE — PACK CYSTO

## (undated) DEVICE — PEN LIGHTS DIAGNOSTIC C-LINE

## (undated) DEVICE — DRAIN PENROSE XRAY 18 X 1/4 ST

## (undated) DEVICE — BANDAGE ELASTIC 2X5 VELCRO ST

## (undated) DEVICE — GAUZE SPONGE 4X4 12PLY

## (undated) DEVICE — SOL NACL IRR 3000ML

## (undated) DEVICE — DRAPE STERI U-SHAPED 47X51IN

## (undated) DEVICE — PAD UNDERPAD 30X30

## (undated) DEVICE — SEE MEDLINE ITEM 146268

## (undated) DEVICE — SEE MEDLINE ITEM 152522

## (undated) DEVICE — TAPE SURG DURAPORE 2 SGL USE

## (undated) DEVICE — PACK CYSTOSCOPY III SIRUS

## (undated) DEVICE — SOL 9P NACL IRR PIC IL

## (undated) DEVICE — TRAY NERVE BLOCK

## (undated) DEVICE — CORD BIPOLAR 12 FOOT

## (undated) DEVICE — SEE MEDLINE ITEM 157173

## (undated) DEVICE — GOWN SURGICAL X-LARGE

## (undated) DEVICE — SEE MEDLINE ITEM 157131

## (undated) DEVICE — SUT MONOCRYL 4-0 PS-2

## (undated) DEVICE — SCRUB 10% POVIDONE IODINE 4OZ

## (undated) DEVICE — SYR 0.9% NACL 10ML STERILE

## (undated) DEVICE — SUT 2-0 FIBERWIRE

## (undated) DEVICE — BANDAGE CONFORM 3IN STRL

## (undated) DEVICE — NDL 25GA 5FR 35MM

## (undated) DEVICE — CLOSURE SKIN STERI STRIP 1/2X4

## (undated) DEVICE — SYR SLIP TIP 1CC

## (undated) DEVICE — SEE MEDLINE ITEM 152515

## (undated) DEVICE — DRESSING ADAPTIC TOUCH 3X2

## (undated) DEVICE — SUT 4/0 18IN ETHILON BL P3

## (undated) DEVICE — DERMABOND SKIN ADHESIVE PROPEN

## (undated) DEVICE — TRAY MINOR ORTHO

## (undated) DEVICE — DRAPE SURG W/TWL 17 5/8X23

## (undated) DEVICE — FORCEP BIPOLAR ADSON 1MM TIP

## (undated) DEVICE — PACK UPPER EXTREMITY BAPTIST

## (undated) DEVICE — ELECTRODE REM PLYHSV RETURN 9

## (undated) DEVICE — SUT CTD VICRYL VIL BR SH 27

## (undated) DEVICE — NDL HYPO 27G X 1 1/2

## (undated) DEVICE — UNDERGLOVES BIOGEL PI SZ 6 LF